# Patient Record
Sex: MALE | Race: WHITE | NOT HISPANIC OR LATINO | Employment: FULL TIME | URBAN - METROPOLITAN AREA
[De-identification: names, ages, dates, MRNs, and addresses within clinical notes are randomized per-mention and may not be internally consistent; named-entity substitution may affect disease eponyms.]

---

## 2017-08-14 ENCOUNTER — OFFICE VISIT (OUTPATIENT)
Dept: GASTROENTEROLOGY | Facility: CLINIC | Age: 47
End: 2017-08-14
Payer: OTHER GOVERNMENT

## 2017-08-14 VITALS
DIASTOLIC BLOOD PRESSURE: 90 MMHG | WEIGHT: 183.81 LBS | BODY MASS INDEX: 29.54 KG/M2 | SYSTOLIC BLOOD PRESSURE: 150 MMHG | HEART RATE: 70 BPM | HEIGHT: 66 IN

## 2017-08-14 DIAGNOSIS — R13.14 PHARYNGOESOPHAGEAL DYSPHAGIA: Primary | ICD-10-CM

## 2017-08-14 DIAGNOSIS — Z87.19 HISTORY OF ESOPHAGEAL STRICTURE: ICD-10-CM

## 2017-08-14 DIAGNOSIS — Z87.898 HISTORY OF DIARRHEA: ICD-10-CM

## 2017-08-14 DIAGNOSIS — R10.13 EPIGASTRIC PAIN: ICD-10-CM

## 2017-08-14 DIAGNOSIS — R89.8 EOSINOPHILS INCREASED: ICD-10-CM

## 2017-08-14 PROCEDURE — 99203 OFFICE O/P NEW LOW 30 MIN: CPT | Mod: S$PBB,,, | Performed by: NURSE PRACTITIONER

## 2017-08-14 PROCEDURE — 99999 PR PBB SHADOW E&M-NEW PATIENT-LVL III: CPT | Mod: PBBFAC,,, | Performed by: NURSE PRACTITIONER

## 2017-08-14 PROCEDURE — 99203 OFFICE O/P NEW LOW 30 MIN: CPT | Mod: PBBFAC,PO | Performed by: NURSE PRACTITIONER

## 2017-08-14 PROCEDURE — 3008F BODY MASS INDEX DOCD: CPT | Mod: ,,, | Performed by: NURSE PRACTITIONER

## 2017-08-14 RX ORDER — HYDROCODONE BITARTRATE AND ACETAMINOPHEN 5; 325 MG/1; MG/1
1 TABLET ORAL EVERY 6 HOURS PRN
Status: ON HOLD | COMMUNITY
End: 2020-03-06 | Stop reason: HOSPADM

## 2017-08-14 RX ORDER — ATORVASTATIN CALCIUM 20 MG/1
20 TABLET, FILM COATED ORAL DAILY
Status: ON HOLD | COMMUNITY
End: 2019-03-26

## 2017-08-14 RX ORDER — METHOCARBAMOL 500 MG/1
500 TABLET, FILM COATED ORAL 4 TIMES DAILY
Status: ON HOLD | COMMUNITY
End: 2019-02-01 | Stop reason: CLARIF

## 2017-08-14 RX ORDER — PHENOBARBITAL, HYOSCYAMINE SULFATE, ATROPINE SULFATE AND SCOPOLAMINE HYDROBROMIDE .0194; .1037; 16.2; .0065 MG/1; MG/1; MG/1; MG/1
1 TABLET ORAL 3 TIMES DAILY PRN
COMMUNITY
End: 2023-08-28

## 2017-08-14 RX ORDER — NAPROXEN 500 MG/1
500 TABLET ORAL 2 TIMES DAILY
COMMUNITY
End: 2021-12-16

## 2017-08-14 NOTE — PATIENT INSTRUCTIONS
Discharge Instructions: Eating a Soft Diet  You have been prescribed a soft diet (also called gastrointestinal soft diet or bland diet). This reduces the amount of work your digestive tract has to do. It also reduces the chance that your digestive tract will be irritated by the food you eat. A soft diet is prescribed for people with digestive problems. The diet consists of foods that are tender, mildly seasoned, and easy to digest. While on this diet, you should not eat fried or spicy foods, or raw fruits and vegetables. Also avoid alcoholic beverages.  General guidelines  · Eat in a calm, relaxed atmosphere. How you eat may be as important as what you eat. Dont rush while eating. Chew your food slowly and thoroughly, and swallow slowly.  · Eat small frequent meals throughout the day, but dont eat within 2 hours of bedtime.  · Avoid any foods that cause discomfort.  · Dont use NSAIDs (nonsteroidal anti-inflammatory drugs), such as aspirin, and ibuprofen. Also avoid medicine that contain aspirin. NSAIDs can cause ulcers and delay or prevent ulcer healing.  · Use antacids as needed, but keep in mind that magnesium-containing antacids may cause diarrhea.  Foods to eat  · Cream of wheat and cream of rice  · Cooked white rice  · Mashed potatoes, and boiled potatoes without skin  · Plain pasta and noodles  · Plain white crackers (such as no-salt soda crackers)  · White bread  · Applesauce  · Cooked fruits without skins or seeds  · Mild juices, such as apple and grape  · Bananas  · Cooked or mashed vegetables without stems and seeds  ¨ Carrots  ¨ Summer squash (zucchini, yellow squash)  ¨ Winter squash (acorn, butternut, spaghetti squash)  · Cottage cheese  · Mild hard or soft cheeses  · Custard  · Yogurt without seeds or nuts  · Milk (you may need lactose-free milk)  · Ice cream without seeds or nuts  · Smooth peanut butter  · Eggs  · Fish, turkey, chicken, or other meat that is not tough or stringy  · Tofu  Foods to  avoid  · Nuts and seeds  · Snack foods, such as the following:  ¨ Chocolate-containing snacks, candy, pastries, or cakes.  ¨ Potato chips (plain, barbecued, or other flavors)  ¨ Taco chips or nachos  ¨ Corn chips  ¨ Popcorn, popcorn cakes, or rice cakes  ¨ Crackers with nuts, seeds, or spicy seasonings  ¨ French fries  · Fried or greasy foods  · Whole-grain breads, rolls, and crackers  · Breads and rolls with nuts, seeds, or bran  · Bran and granola cereals  · Berries with seeds, such as strawberries, raspberries, and blackberries  · Acidic fruits, such as oranges, grapefruits, ludy, limes, and pineapples  · Raw vegetables  · Mild or hot peppers  · Sauerkraut and pickled vegetables  · Tomatoes or tomato products, such as tomato paste, tomato sauce, and tomato juice  · Barbecue sauce  · Spicy or flavored cheeses, such as jalapeño and black pepper cheese  · Crunchy peanut butter  · Dried cooked beans, such as carrington, kidney, or navy beans  · The following meats:  ¨ Fried or greasy meats  ¨ Processed, spicy meats, such as sausage, hope, ham, and lunch meats  ¨ Ribs and other meats with barbecue sauce  ¨ Tough or stringy meats, such as corned beef or beef jerky  Fluids to avoid  · Alcoholic beverages  · Coffee and regular teas  · Brenda and other drinks with caffeine  · Cranberry, orange, pineapple, and grapefruit juice  · Lemonade  · Vegetable juice  · Whole milk, if you are lactose intolerant  Follow-up  Make a follow-up appointment with a dietitian as directed by our staff.  Date Last Reviewed: 6/21/2015  © 3358-0967 [employer]. [address] [place] [address], [place] [address]. All rights reserved. This information is not intended as a substitute for professional medical care. Always follow your healthcare professional's instructions.        GERD (Adult)    The esophagus is a tube that carries food from the mouth to the stomach. A valve at the lower end of the esophagus prevents stomach acid from flowing  "upward. When this valve doesn't work properly, stomach contents may repeatedly flow back up (reflux) into the esophagus. This is called gastroesophageal reflux disease (GERD). GERD can irritate the esophagus. It can cause problems with swallowing or breathing. In severe cases, GERD can cause recurrent pneumonia or other serious problems.  Symptoms of reflux include burning, pressure or sharp pain in the upper abdomen or mid to lower chest. The pain can spread to the neck, back, or shoulder. There may be belching, an acid taste in the back of the throat, chronic cough, or sore throat or hoarseness. GERD symptoms often occur during the day after a big meal. They can also occur at night when lying down.   Home care  Lifestyle changes can help reduce symptoms. If needed, medicines may be prescribed. Symptoms often improve with treatment, but if treatment is stopped, the symptoms often return after a few months. So most persons with GERD will need to continue treatment.  Lifestyle changes  · Limit or avoid fatty, fried, and spicy foods, as well as coffee, chocolate, mint, and foods with high acid content such as tomatoes and citrus fruit and juices (orange, grapefruit, lemon).  · Dont eat large meals, especially at night. Frequent, smaller meals are best. Do not lie down right after eating. And dont eat anything 3 hours before going to bed.  · Avoid drinking alcohol and smoking. As much as possible, stay away from second hand smoke.  · If you are overweight, losing weight will reduce symptoms.   · Avoid wearing tight clothing around your stomach area.  · If your symptoms occur during sleep, use a foam wedge to elevate your upper body (not just your head.) Or, place 4" blocks under the head of your bed.  Medicines  If needed, medicines can help relieve the symptoms of GERD and prevent damage to the esophagus. Discuss a medicine plan with your healthcare provider. This may include one or more of the following " medicines:  · Antacids to help neutralize the normal acids in your stomach.  · Acid blockers (H2 blockers) to decrease acid production.  · Acid inhibitors (PPIs) to decrease acid production in a different way than the blockers. They may work better, but can take a little longer to take effect.  Take an antacid 30-60 minutes after eating and at bedtime, but not at the same time as an acid blocker.  Try not to take medicines such as ibuprofen and aspirin. If you are taking aspirin for your heart or other medical reasons, talk to your healthcare provider about stopping it.  Follow-up care  Follow up with your healthcare provider or as advised by our staff.  When to seek medical advice  Call your healthcare provider if any of the following occur:  · Stomach pain gets worse or moves to the lower right abdomen (appendix area)  · Chest pain appears or gets worse, or spreads to the back, neck, shoulder, or arm  · Frequent vomiting (cant keep down liquids)  · Blood in the stool or vomit (red or black in color)  · Feeling weak or dizzy  · Fever of 100.4ºF (38ºC) or higher, or as directed by your healthcare provider  Date Last Reviewed: 6/23/2015  © 5422-8416 [employer]. [address] [place] [address], [place] [address]. All rights reserved. This information is not intended as a substitute for professional medical care. Always follow your healthcare professional's instructions.

## 2017-08-14 NOTE — LETTER
August 14, 2017      Jayde Fleming MD  [address]  [place] [address]           [facility]  [address] [facility] [address] [place] [address]  Phone: [phone]          Patient: Brennan Bruner   MR Number: [id number]   YOB: 1970   Date of Visit: 8/14/2017       Dear Dr. Jayde Fleming:    Thank you for referring Brennan Bruner to me for evaluation. Attached you will find relevant portions of my assessment and plan of care.    If you have questions, please do not hesitate to call me. I look forward to following Brennan Bruner along with you.    Sincerely,    Mai Crowder, [facility]    Enclosure  CC:  No Recipients    If you would like to receive this communication electronically, please contact [email or web address] or [phone] to request more information on [facility] [employer] [facility] access.    For providers and/or their staff who would like to refer a patient to [facility], please contact us through our one-stop-shop provider referral line, [facility] , at [phone].    If you feel you have received this communication in error or would no longer like to receive these types of communications, please e-mail [email or web address]

## 2017-08-14 NOTE — PROGRESS NOTES
Subjective:       Patient ID: Brennan Bruner is a 47 y.o. male Body mass index is 29.67 kg/m².    Chief Complaint: GI Problem (dysphagia)    This patient is new to me.  [facility]    [facility] found in [facility].  Reviewed medical records received from patient, summarized below and in medical & surgical history (endoscopies, etc), copies made & given to nurse to be scanned into system:   6/17/13 EGD; allergy testing from 8/21/13, 11/8/13 and 6/20/13 visit notes      GI Problem   The primary symptoms include abdominal pain, nausea (frequent) and diarrhea. Primary symptoms do not include fever, weight loss (gained about 10 lbs over the past 2-3 months), vomiting, melena, hematemesis or hematochezia. Primary symptoms comment: CHIEF COMPLAINT: DYSPHAGIA. The illness began more than 7 days ago (over the past year dysphagia).   The abdominal pain began more than 2 days ago (history of abdominal pain for several years; not a new symptom). The abdominal pain has been unchanged since its onset. The abdominal pain is located in the epigastric region (occasional). The severity of the abdominal pain is 0/10 (currently). Relieved by: donnatal prn.   The diarrhea began more than 1 week ago (started 2 months ago). Daily occurrences: bowel movements once daily currently; diarrhea occurred about once a week of 4-5 times a day- has resolved as of lately.   The illness is also significant for dysphagia (CHIEF COMPLAINT: worse with evening meal, fullness/tightness in esophagus; relieved with walking; had in the past and over the past few months symptoms have recurred; relieved in the past with EGD with dilation) and odynophagia (with episodes of dysphagia). The illness does not include anorexia, bloating or constipation. Associated symptoms comments: Early satiety, globus sensation. Significant associated medical issues include GERD (rarely, 2-3 times a  month; PPIs in the past did not help with symptoms including dexilant; controlled with diet and lifestyle modifications). Associated medical issues do not include inflammatory bowel disease.     Review of Systems   Constitutional: Negative for appetite change, fever and weight loss (gained about 10 lbs over the past 2-3 months).        Patient reports history of elevated eosinophils on blood work dating back to 2012 (blood work not in medical records patient brought in to visit today)   HENT: Positive for trouble swallowing. Negative for sore throat and voice change.    Respiratory: Negative for cough and shortness of breath.    Cardiovascular: Negative for chest pain.   Gastrointestinal: Positive for abdominal pain, diarrhea, dysphagia (CHIEF COMPLAINT: worse with evening meal, fullness/tightness in esophagus; relieved with walking; had in the past and over the past few months symptoms have recurred; relieved in the past with EGD with dilation) and nausea (frequent). Negative for anal bleeding, anorexia, bloating, blood in stool, constipation, hematemesis, hematochezia, melena and vomiting.       Past Medical History:   Diagnosis Date    HTN (hypertension)      Past Surgical History:   Procedure Laterality Date    UPPER GASTROINTESTINAL ENDOSCOPY  06/17/2013    [place]- sent for scanning: stenosis s/p dilation, biopsied GEJ, gastritis, antritis (hard to read handwriting); biospy: antrum mild reactive gastropathy, negative h pylori; GEJ: active esophagitis with increased intraepithelial eosinophils; gastric type mucosa with chronic and active inflammation, no kwan's (no proximal esophagus biopsy done)     Family History   Problem Relation Age of Onset    Colon cancer Neg Hx     Colon polyps Neg Hx     Crohn's disease Neg Hx     Ulcerative colitis Neg Hx     Stomach cancer Neg Hx     Esophageal cancer Neg Hx      Wt Readings from Last 10 Encounters:   08/14/17 83.4 kg (183 lb 12.8 oz)     Objective:       Physical Exam   Constitutional: He is oriented to person, place, and time. He appears well-developed and well-nourished. No distress.   HENT:   Mouth/Throat: Oropharynx is clear and moist and mucous membranes are normal. No oral lesions. No oropharyngeal exudate.   Eyes: Conjunctivae are normal. Pupils are equal, round, and reactive to light. No scleral icterus.   Neck: Neck supple. No tracheal deviation present.   Cardiovascular: Normal rate.    Pulmonary/Chest: Effort normal and breath sounds normal. No respiratory distress. He has no wheezes.   Abdominal: Soft. Normal appearance and bowel sounds are normal. He exhibits no distension, no abdominal bruit and no mass. There is no hepatosplenomegaly. There is no tenderness. There is no rigidity, no rebound, no guarding, no tenderness at McBurney's point and negative Ramirez's sign. No hernia.   Lymphadenopathy:     He has no cervical adenopathy.   Neurological: He is alert and oriented to person, place, and time.   Skin: Skin is warm and dry. No rash noted. He is not diaphoretic. No erythema. No pallor.   Non-jaundiced   Psychiatric: He has a normal mood and affect. His behavior is normal.   Nursing note and vitals reviewed.      Assessment:       1. Pharyngoesophageal dysphagia    2. History of esophageal stricture    3. Eosinophils increased    4. History of diarrhea    5. Epigastric pain        Plan:       Pharyngoesophageal dysphagia & History of esophageal stricture  - schedule EGD, discussed procedure with patient and possible esophageal dilation may be performed during procedure if indicated, patient verbalized understanding  - educated patient to eat smaller more frequent meals and to eat slowly and advised to eat a soft diet.  - possible UGI/esophagram/esophageal manometry if symptoms persist  -discussed about the different types of medications used to treat reflux, patient verbalized understanding and declined starting a reflux medication  -Educated  patient on lifestyle modifications to help control/reduce reflux/abdominal pain including: avoid large meals, avoid eating within 2-3 hours of bedtime (avoid late night eating & lying down soon after eating), elevate head of bed if nocturnal symptoms are present, smoking cessation (if current smoker), & weight loss (if overweight).   -Educated to avoid known foods which trigger reflux symptoms & to minimize/avoid high-fat foods, chocolate, caffeine, citrus, alcohol, & tomato products.  -Advised to avoid/limit use of NSAID's, since they can cause GI upset, bleeding, and/or ulcers. If needed, take with food.    Eosinophils increased  - discussed that eosinophilic esophagitis is diagnosed by having proximal and distal esophagus biopsies since distal esophagus findings can be related to reflux  - schedule EGD, discussed procedure with patient, verbalized understanding    epigastric pain  - schedule EGD, discussed procedure with patient, verbalized understanding  - possible abdominal imaging if symptoms persist and pending results of testing    History of diarrhea  - recommend OTC probiotics, such as Align or Culturelle, as directed  - avoid lactose  - if symptoms recur, recommend follow-up for continued evaluation and management to include stool studies and possible colonoscopy    Return in about 2 months (around 10/14/2017), or if symptoms worsen or fail to improve.      If no improvement in symptoms or symptoms worsen, call/follow-up at clinic or go to ER.

## 2017-08-30 NOTE — H&P
History & Physical - Short Stay  Gastroenterology      SUBJECTIVE:     Procedure: Gastroscopy                Chief Complaint/Indication for Procedure: Dysphagia.   History of esophageal stricture.    History of Present Illness:  Office Visit     8/14/2017  [facility]   SILVIO Keith   Gastroenterology   Pharyngoesophageal dysphagia +4 more   Dx   GI Problem ; Referred by Jayde Fleming MD   Reason for Visit    Progress Notes        Subjective:       Patient ID: Brennan Bruner is a 47 y.o. male Body mass index is 29.67 kg/m².     Chief Complaint: GI Problem (dysphagia)     This patient is new to me.  [facility] Provider[facility]     [facility] found in [facility] section.  Reviewed medical records received from patient, summarized below and in medical & surgical history (endoscopies, etc), copies made & given to nurse to be scanned into system:   6/17/13 EGD; allergy testing from 8/21/13, 11/8/13 and 6/20/13 visit notes     GI Problem   The primary symptoms include abdominal pain, nausea (frequent) and diarrhea. Primary symptoms do not include fever, weight loss (gained about 10 lbs over the past 2-3 months), vomiting, melena, hematemesis or hematochezia. Primary symptoms comment: CHIEF COMPLAINT: DYSPHAGIA. The illness began more than 7 days ago (over the past year dysphagia).   The abdominal pain began more than 2 days ago (history of abdominal pain for several years; not a new symptom). The abdominal pain has been unchanged since its onset. The abdominal pain is located in the epigastric region (occasional). The severity of the abdominal pain is 0/10 (currently). Relieved by: donnatal prn.   The diarrhea began more than 1 week ago (started 2 months ago). Daily occurrences: bowel movements once daily currently; diarrhea occurred about once a week of 4-5 times a day- has resolved as of lately.   The illness is also significant for  dysphagia (CHIEF COMPLAINT: worse with evening meal, fullness/tightness in esophagus; relieved with walking; had in the past and over the past few months symptoms have recurred; relieved in the past with EGD with dilation) and odynophagia (with episodes of dysphagia). The illness does not include anorexia, bloating or constipation. Associated symptoms comments: Early satiety, globus sensation. Significant associated medical issues include GERD (rarely, 2-3 times a month; PPIs in the past did not help with symptoms including dexilant; controlled with diet and lifestyle modifications). Associated medical issues do not include inflammatory bowel disease.      Review of Systems   Constitutional: Negative for appetite change, fever and weight loss (gained about 10 lbs over the past 2-3 months).        Patient reports history of elevated eosinophils on blood work dating back to 2012 (blood work not in medical records patient brought in to visit today)   HENT: Positive for trouble swallowing. Negative for sore throat and voice change.    Respiratory: Negative for cough and shortness of breath.    Cardiovascular: Negative for chest pain.   Gastrointestinal: Positive for abdominal pain, diarrhea, dysphagia (CHIEF COMPLAINT: worse with evening meal, fullness/tightness in esophagus; relieved with walking; had in the past and over the past few months symptoms have recurred; relieved in the past with EGD with dilation) and nausea (frequent). Negative for anal bleeding, anorexia, bloating, blood in stool, constipation, hematemesis, hematochezia, melena and vomiting.    UPPER GASTROINTESTINAL ENDOSCOPY   06/17/2013      [place]- sent for scanning: stenosis s/p dilation, biopsied GEJ, gastritis, antritis (hard to read handwriting); biospy: antrum mild reactive gastropathy, negative h pylori; GEJ: active esophagitis with increased intraepithelial eosinophils; gastric type mucosa with chronic and active inflammation, no  kwan's (no proximal esophagus biopsy done)     Assessment:       1. Pharyngoesophageal dysphagia    2. History of esophageal stricture    3. Eosinophils increased    4. History of diarrhea    5. Epigastric pain        Plan:       Pharyngoesophageal dysphagia & History of esophageal stricture  - schedule EGD, discussed procedure with patient and possible esophageal dilation may be performed during procedure if indicated, patient verbalized understanding  - educated patient to eat smaller more frequent meals and to eat slowly and advised to eat a soft diet.  - possible UGI/esophagram/esophageal manometry if symptoms persist  -discussed about the different types of medications used to treat reflux, patient verbalized understanding and declined starting a reflux medication  -Educated patient on lifestyle modifications to help control/reduce reflux/abdominal pain including: avoid large meals, avoid eating within 2-3 hours of bedtime (avoid late night eating & lying down soon after eating), elevate head of bed if nocturnal symptoms are present, smoking cessation (if current smoker), & weight loss (if overweight).   -Educated to avoid known foods which trigger reflux symptoms & to minimize/avoid high-fat foods, chocolate, caffeine, citrus, alcohol, & tomato products.  -Advised to avoid/limit use of NSAID's, since they can cause GI upset, bleeding, and/or ulcers. If needed, take with food.     Eosinophils increased  - discussed that eosinophilic esophagitis is diagnosed by having proximal and distal esophagus biopsies since distal esophagus findings can be related to reflux  - schedule EGD, discussed procedure with patient, verbalized understanding     epigastric pain  - schedule EGD, discussed procedure with patient, verbalized understanding  - possible abdominal imaging if symptoms persist and pending results of testing     History of diarrhea  - recommend OTC probiotics, such as Align or Culturelle, as directed  -  avoid lactose  - if symptoms recur, recommend follow-up for continued evaluation and management to include stool studies and possible colonoscopy     Return in about 2 months (around 10/14/2017), or if symptoms worsen or fail to improve.      If no improvement in symptoms or symptoms worsen, call/follow-up at clinic or go to ER.              PTA Medications   Medication Sig    atorvastatin (LIPITOR) 20 MG tablet Take 20 mg by mouth once daily.    atropine-phenobarbital-scopolamine-hyoscyamine (DONNATAL) 16.2-0.1037 -0.0194 mg Tab Take 1 tablet by mouth 3 (three) times daily as needed.    hydrocodone-acetaminophen 5-325mg (NORCO) 5-325 mg per tablet Take 1 tablet by mouth every 6 (six) hours as needed for Pain.    LOSARTAN POTASSIUM (COZAAR ORAL) Take by mouth.    methocarbamol (ROBAXIN) 500 MG Tab Take 500 mg by mouth 4 (four) times daily.    naproxen (NAPROSYN) 500 MG tablet Take 500 mg by mouth 2 (two) times daily.       Review of patient's allergies indicates:  No Known Allergies     Past Medical History:   Diagnosis Date    HTN (hypertension)      Past Surgical History:   Procedure Laterality Date    ANTERIOR CERVICAL DISCECTOMY W/ FUSION      BACK SURGERY      lumbar discectomy    EYE SURGERY Bilateral     lasik    SHOULDER ARTHROSCOPY Right     UPPER GASTROINTESTINAL ENDOSCOPY  06/17/2013    [place]- sent for scanning: stenosis s/p dilation, biopsied GEJ, gastritis, antritis (hard to read handwriting); biospy: antrum mild reactive gastropathy, negative h pylori; GEJ: active esophagitis with increased intraepithelial eosinophils; gastric type mucosa with chronic and active inflammation, no kwan's (no proximal esophagus biopsy done)     Family History   Problem Relation Age of Onset    Colon cancer Neg Hx     Colon polyps Neg Hx     Crohn's disease Neg Hx     Ulcerative colitis Neg Hx     Stomach cancer Neg Hx     Esophageal cancer Neg Hx      Social History   Substance Use Topics     "Smoking status: Never Smoker    Smokeless tobacco: Never Used    Alcohol use Yes      Comment: socially         OBJECTIVE:     Vital Signs (Most Recent)  Temp: 97.7 °F (36.5 °C) (08/31/17 0958)  Pulse: 82 (08/31/17 0958)  Resp: 16 (08/31/17 0958)  BP: 115/76 (08/31/17 0958)  SpO2: 98 % (08/31/17 0958)    Physical Exam:           :  Ht 5' 6" (1.676 m)    Wt 83.4 kg (183 lb 12.8 oz)    BMI 29.67 kg/m²                       GENERAL:  Comfortable, in no acute distress.                                 HEENT EXAM:  Nonicteric.  No adenopathy.  Oropharynx is clear.               NECK:  Supple.                                                               LUNGS:  Clear.                                                               CARDIAC:  Regular rate and rhythm.  S1, S2.  No murmur.                      ABDOMEN:  Soft, positive bowel sounds, nontender.  No hepatosplenomegaly or masses.  No rebound or guarding.                                             EXTREMITIES:  No edema.     MENTAL STATUS:  Alert and oriented.    ASSESSMENT/PLAN:                 Assessment: Dysphagia.  History of esophageal stricture    Plan: Gastroscopy    Anesthesia Plan:   MAC / General Anaesthesia    ASA Grade: ASA 2 - Patient with mild systemic disease with no functional limitations    MALLAMPATI SCORE: II (hard and soft palate, upper portion of tonsils anduvula visible)  "

## 2017-08-31 ENCOUNTER — HOSPITAL ENCOUNTER (OUTPATIENT)
Facility: HOSPITAL | Age: 47
Discharge: HOME | End: 2017-08-31
Attending: INTERNAL MEDICINE | Admitting: INTERNAL MEDICINE
Payer: OTHER GOVERNMENT

## 2017-08-31 ENCOUNTER — ANESTHESIA (OUTPATIENT)
Dept: ENDOSCOPY | Facility: HOSPITAL | Age: 47
End: 2017-08-31
Payer: OTHER GOVERNMENT

## 2017-08-31 ENCOUNTER — SURGERY (OUTPATIENT)
Age: 47
End: 2017-08-31

## 2017-08-31 ENCOUNTER — ANESTHESIA EVENT (OUTPATIENT)
Dept: ENDOSCOPY | Facility: HOSPITAL | Age: 47
End: 2017-08-31
Payer: OTHER GOVERNMENT

## 2017-08-31 VITALS
DIASTOLIC BLOOD PRESSURE: 78 MMHG | RESPIRATION RATE: 20 BRPM | TEMPERATURE: 98 F | WEIGHT: 175 LBS | BODY MASS INDEX: 27.47 KG/M2 | HEIGHT: 67 IN | SYSTOLIC BLOOD PRESSURE: 122 MMHG | HEART RATE: 95 BPM | RESPIRATION RATE: 13 BRPM | OXYGEN SATURATION: 95 %

## 2017-08-31 DIAGNOSIS — R13.10 DYSPHAGIA: ICD-10-CM

## 2017-08-31 LAB — H PYLORI INDEX VALUE: NEGATIVE

## 2017-08-31 PROCEDURE — 37000008 HC ANESTHESIA 1ST 15 MINUTES: Mod: PO | Performed by: INTERNAL MEDICINE

## 2017-08-31 PROCEDURE — 88305 TISSUE EXAM BY PATHOLOGIST: CPT | Performed by: PATHOLOGY

## 2017-08-31 PROCEDURE — 25000003 PHARM REV CODE 250: Mod: PO | Performed by: INTERNAL MEDICINE

## 2017-08-31 PROCEDURE — 88341 IMHCHEM/IMCYTCHM EA ADD ANTB: CPT | Mod: 26,,, | Performed by: PATHOLOGY

## 2017-08-31 PROCEDURE — 43248 EGD GUIDE WIRE INSERTION: CPT | Mod: ,,, | Performed by: INTERNAL MEDICINE

## 2017-08-31 PROCEDURE — 37000009 HC ANESTHESIA EA ADD 15 MINS: Mod: PO | Performed by: INTERNAL MEDICINE

## 2017-08-31 PROCEDURE — 25000003 PHARM REV CODE 250: Mod: PO | Performed by: NURSE ANESTHETIST, CERTIFIED REGISTERED

## 2017-08-31 PROCEDURE — 43239 EGD BIOPSY SINGLE/MULTIPLE: CPT | Mod: PO | Performed by: INTERNAL MEDICINE

## 2017-08-31 PROCEDURE — 88342 IMHCHEM/IMCYTCHM 1ST ANTB: CPT | Mod: 26,,, | Performed by: PATHOLOGY

## 2017-08-31 PROCEDURE — D9220A PRA ANESTHESIA: Mod: ,,, | Performed by: ANESTHESIOLOGY

## 2017-08-31 PROCEDURE — 43248 EGD GUIDE WIRE INSERTION: CPT | Mod: PO | Performed by: INTERNAL MEDICINE

## 2017-08-31 PROCEDURE — 27201012 HC FORCEPS, HOT/COLD, DISP: Mod: PO | Performed by: INTERNAL MEDICINE

## 2017-08-31 PROCEDURE — 87449 NOS EACH ORGANISM AG IA: CPT | Mod: PO | Performed by: INTERNAL MEDICINE

## 2017-08-31 PROCEDURE — 43239 EGD BIOPSY SINGLE/MULTIPLE: CPT | Mod: ,,, | Performed by: INTERNAL MEDICINE

## 2017-08-31 PROCEDURE — 88305 TISSUE EXAM BY PATHOLOGIST: CPT | Mod: 26,,, | Performed by: PATHOLOGY

## 2017-08-31 PROCEDURE — 63600175 PHARM REV CODE 636 W HCPCS: Mod: PO | Performed by: NURSE ANESTHETIST, CERTIFIED REGISTERED

## 2017-08-31 RX ORDER — PANTOPRAZOLE SODIUM 40 MG/1
40 TABLET, DELAYED RELEASE ORAL
Qty: 90 TABLET | Refills: 4 | Status: SHIPPED | OUTPATIENT
Start: 2017-08-31 | End: 2023-09-08

## 2017-08-31 RX ORDER — GLYCOPYRROLATE 0.2 MG/ML
INJECTION INTRAMUSCULAR; INTRAVENOUS
Status: DISCONTINUED | OUTPATIENT
Start: 2017-08-31 | End: 2017-08-31

## 2017-08-31 RX ORDER — FENTANYL CITRATE 50 UG/ML
INJECTION, SOLUTION INTRAMUSCULAR; INTRAVENOUS
Status: DISCONTINUED | OUTPATIENT
Start: 2017-08-31 | End: 2017-08-31

## 2017-08-31 RX ORDER — PROPOFOL 10 MG/ML
VIAL (ML) INTRAVENOUS
Status: DISCONTINUED | OUTPATIENT
Start: 2017-08-31 | End: 2017-08-31

## 2017-08-31 RX ORDER — SODIUM CHLORIDE, SODIUM LACTATE, POTASSIUM CHLORIDE, CALCIUM CHLORIDE 600; 310; 30; 20 MG/100ML; MG/100ML; MG/100ML; MG/100ML
INJECTION, SOLUTION INTRAVENOUS CONTINUOUS
Status: DISCONTINUED | OUTPATIENT
Start: 2017-08-31 | End: 2017-08-31 | Stop reason: HOSPADM

## 2017-08-31 RX ORDER — ONDANSETRON 4 MG/1
4 TABLET, ORALLY DISINTEGRATING ORAL EVERY 6 HOURS PRN
Qty: 6 TABLET | Refills: 3 | Status: SHIPPED | OUTPATIENT
Start: 2017-08-31 | End: 2019-02-05 | Stop reason: SDUPTHER

## 2017-08-31 RX ORDER — LIDOCAINE HCL/PF 100 MG/5ML
SYRINGE (ML) INTRAVENOUS
Status: DISCONTINUED | OUTPATIENT
Start: 2017-08-31 | End: 2017-08-31

## 2017-08-31 RX ADMIN — GLYCOPYRROLATE 0.2 MG: 0.2 INJECTION, SOLUTION INTRAMUSCULAR; INTRAVENOUS at 10:08

## 2017-08-31 RX ADMIN — PROPOFOL 20 MG: 10 INJECTION, EMULSION INTRAVENOUS at 10:08

## 2017-08-31 RX ADMIN — PROPOFOL 20 MG: 10 INJECTION, EMULSION INTRAVENOUS at 11:08

## 2017-08-31 RX ADMIN — PROPOFOL 50 MG: 10 INJECTION, EMULSION INTRAVENOUS at 10:08

## 2017-08-31 RX ADMIN — SODIUM CHLORIDE, SODIUM LACTATE, POTASSIUM CHLORIDE, AND CALCIUM CHLORIDE: .6; .31; .03; .02 INJECTION, SOLUTION INTRAVENOUS at 10:08

## 2017-08-31 RX ADMIN — FENTANYL CITRATE 50 MCG: 50 INJECTION, SOLUTION INTRAMUSCULAR; INTRAVENOUS at 10:08

## 2017-08-31 RX ADMIN — LIDOCAINE HYDROCHLORIDE 50 MG: 20 INJECTION, SOLUTION INTRAVENOUS at 10:08

## 2017-08-31 NOTE — ANESTHESIA PREPROCEDURE EVALUATION
08/31/2017  Brennan Bruner is a 47 y.o., male.    Anesthesia Evaluation    I have reviewed the Patient Summary Reports.    I have reviewed the Nursing Notes.   I have reviewed the Medications.     Review of Systems  Anesthesia Hx:  No problems with previous Anesthesia    Social:  Non-Smoker, Alcohol Use    Cardiovascular:   Exercise tolerance: good Hypertension Denies CAD.    Denies CABG/stent.   Denies Angina. hyperlipidemia    Musculoskeletal:   Arthritis  H/o ACDF C5-7, neck ROM relatively good Spine Disorders:        Physical Exam  General:  Well nourished    Airway/Jaw/Neck:  Airway Findings: Mouth Opening: Normal Mallampati: II  TM Distance: Normal, at least 6 cm  Jaw/Neck Findings:  Neck ROM: Extension Decreased, Mild      Dental:  Dental Findings: In tact   Chest/Lungs:  Chest/Lungs Findings: Clear to auscultation, Normal Respiratory Rate     Heart/Vascular:  Heart Findings: Rate: Normal  Rhythm: Regular Rhythm        Mental Status:  Mental Status Findings:  Cooperative, Alert and Oriented         Anesthesia Plan  Type of Anesthesia, risks & benefits discussed:  Anesthesia Type:  general  Patient's Preference:   Intra-op Monitoring Plan: standard ASA monitors  Intra-op Monitoring Plan Comments:   Post Op Pain Control Plan:   Post Op Pain Control Plan Comments:   Induction:   IV  Beta Blocker:  Patient is not currently on a Beta-Blocker (No further documentation required).       Informed Consent: Patient understands risks and agrees with Anesthesia plan.  Questions answered. Anesthesia consent signed with patient.  ASA Score: 2     Day of Surgery Review of History & Physical: I have interviewed and examined the patient. I have reviewed the patient's H&P dated:  There are no significant changes.          Ready For Surgery From Anesthesia Perspective.

## 2017-08-31 NOTE — BRIEF OP NOTE
Discharge Note  Short Stay      SUMMARY     Admit Date: 8/31/2017    Attending Physician: Gonzalo Andrea Jr., MD     Discharge Physician: Gonzalo Andrea Jr., MD    Discharge Date: 8/31/2017 11:33 AM    Final Diagnosis: Dysphagia, unspecified type [R13.10]  History of esophageal stricture [Z87.19]    Esophageal changes c/w EE.  Grade a reflux esophagitis.  Minimal antritis.  Dilated, 54 Fr.    Disposition: HOME OR SELF CARE    Patient Instructions:   Current Discharge Medication List      START taking these medications    Details   pantoprazole (PROTONIX) 40 MG tablet Take 1 tablet (40 mg total) by mouth before breakfast.  Qty: 90 tablet, Refills: 4         CONTINUE these medications which have NOT CHANGED    Details   atorvastatin (LIPITOR) 20 MG tablet Take 20 mg by mouth once daily.      atropine-phenobarbital-scopolamine-hyoscyamine (DONNATAL) 16.2-0.1037 -0.0194 mg Tab Take 1 tablet by mouth 3 (three) times daily as needed.      hydrocodone-acetaminophen 5-325mg (NORCO) 5-325 mg per tablet Take 1 tablet by mouth every 6 (six) hours as needed for Pain.      LOSARTAN POTASSIUM (COZAAR ORAL) Take by mouth.      methocarbamol (ROBAXIN) 500 MG Tab Take 500 mg by mouth 4 (four) times daily.      naproxen (NAPROSYN) 500 MG tablet Take 500 mg by mouth 2 (two) times daily.             Discharge Procedure Orders (must include Diet, Follow-up, Activity)    Follow Up:  Follow up with PCP as per your routine.  Please follow an anti reflux diet.  Activity as tolerated.    No driving day of procedure.

## 2017-08-31 NOTE — ANESTHESIA POSTPROCEDURE EVALUATION
"Anesthesia Post Evaluation    Patient: Brennan Bruner    Procedure(s) Performed: Procedure(s) (LRB):  ESOPHAGOGASTRODUODENOSCOPY (EGD) (N/A)    Final Anesthesia Type: general  Patient location during evaluation: PACU  Patient participation: Yes- Able to Participate  Level of consciousness: awake and alert and oriented  Post-procedure vital signs: reviewed and stable  Pain management: adequate  Airway patency: patent  PONV status at discharge: No PONV  Anesthetic complications: no      Cardiovascular status: hemodynamically stable  Respiratory status: unassisted, spontaneous ventilation and room air  Hydration status: euvolemic  Follow-up not needed.        Visit Vitals  /78   Pulse 95   Temp 36.4 °C (97.5 °F) (Temporal)   Resp 20   Ht 5' 6.5" (1.689 m)   Wt 79.4 kg (175 lb)   SpO2 95%   BMI 27.82 kg/m²       Pain/Jacqueline Score: Pain Assessment Performed: Yes (8/31/2017 11:46 AM)  Presence of Pain: denies (8/31/2017 11:46 AM)  Jacqueline Score: 10 (8/31/2017 11:13 AM)      "

## 2017-08-31 NOTE — DISCHARGE INSTRUCTIONS
Recovery After Procedural Sedation (Adult)  You have been given medicine by vein to make you sleep during your surgery. This may have included both a pain medicine and sleeping medicine. Most of the effects have worn off. But you may still have some drowsiness for the next 6 to 8 hours.  Home care  Follow these guidelines when you get home:  · For the next 8 hours, you should be watched by a responsible adult. This person should make sure your condition is not getting worse.  · Don't drink any alcohol for the next 24 hours.  · Don't drive, operate dangerous machinery, or make important business or personal decisions during the next 24 hours.  Note: Your healthcare provider may tell you not to take any medicine by mouth for pain or sleep in the next 4 hours. These medicines may react with the medicines you were given in the hospital. This could cause a much stronger response than usual.  Follow-up care  Follow up with your healthcare provider if you are not alert and back to your usual level of activity within 12 hours.  When to seek medical advice  Call your healthcare provider right away if any of these occur:  · Drowsiness gets worse  · Weakness or dizziness gets worse  · Repeated vomiting  · You can't be awakened   Date Last Reviewed: 10/18/2016  © 9256-5068 [employer]. [address] [place] [address], [place] [address]. All rights reserved. This information is not intended as a substitute for professional medical care. Always follow your healthcare professional's instructions.        Tips to Control Acid Reflux    To control acid reflux, youll need to make some basic diet and lifestyle changes. The simple steps outlined below may be all youll need to ease discomfort.  Watch what you eat  · Avoid fatty foods and spicy foods.  · Eat fewer acidic foods, such as citrus and tomato-based foods. These can increase symptoms.  · Limit drinking alcohol, caffeine, and fizzy beverages. All increase acid  reflux.  · Try limiting chocolate, peppermint, and spearmint. These can worsen acid reflux in some people.  Watch when you eat  · Avoid lying down for 3 hours after eating.  · Do not snack before going to bed.  Raise your head  Raising your head and upper body by 4 to 6 inches helps limit reflux when youre lying down. Put blocks under the head of your bed frame to raise it.  Other changes  · Lose weight, if you need to  · Dont exercise near bedtime  · Avoid tight-fitting clothes  · Limit aspirin and ibuprofen  · Stop smoking   Date Last Reviewed: 7/1/2016  © 4429-5878 [employer]. [address] [place] [address], [place] [address]. All rights reserved. This information is not intended as a substitute for professional medical care. Always follow your healthcare professional's instructions.

## 2017-08-31 NOTE — TRANSFER OF CARE
"Anesthesia Transfer of Care Note    Patient: Brennan Bruner    Procedure(s) Performed: Procedure(s) (LRB):  ESOPHAGOGASTRODUODENOSCOPY (EGD) (N/A)    Patient location: PACU    Anesthesia Type: general    Transport from OR: Transported from OR on room air with adequate spontaneous ventilation    Post pain: adequate analgesia    Post assessment: no apparent anesthetic complications and tolerated procedure well    Post vital signs: stable    Level of consciousness: lethargic and responds to stimulation    Nausea/Vomiting: no nausea/vomiting    Complications: none    Transfer of care protocol was followed      Last vitals:   Visit Vitals  /76 (BP Location: Right arm, Patient Position: Lying)   Pulse 82   Temp 36.5 °C (97.7 °F) (Skin)   Resp 16   Ht 5' 6.5" (1.689 m)   Wt 79.4 kg (175 lb)   SpO2 98%   BMI 27.82 kg/m²     "

## 2017-10-19 ENCOUNTER — TELEPHONE (OUTPATIENT)
Dept: GASTROENTEROLOGY | Facility: CLINIC | Age: 47
End: 2017-10-19

## 2017-10-19 NOTE — TELEPHONE ENCOUNTER
----- Message from Shannan Garcia sent at 10/19/2017  7:47 AM CDT -----  Contact: SELF  Patient's appointment was canceled today and the cancel reason said it had something to do with the provider not being covered. The patient came here for his appointment today and said it could be filed through his . Please contact the patient to reschedule. He told me he had to leave and go to work so he could not wait until 8:00 am to see if someone could come out and talk to him.

## 2018-06-15 ENCOUNTER — OFFICE VISIT (OUTPATIENT)
Dept: GASTROENTEROLOGY | Facility: CLINIC | Age: 48
End: 2018-06-15
Payer: OTHER GOVERNMENT

## 2018-06-15 ENCOUNTER — LAB VISIT (OUTPATIENT)
Dept: LAB | Facility: HOSPITAL | Age: 48
End: 2018-06-15
Attending: NURSE PRACTITIONER
Payer: OTHER GOVERNMENT

## 2018-06-15 VITALS
BODY MASS INDEX: 29.3 KG/M2 | SYSTOLIC BLOOD PRESSURE: 133 MMHG | HEART RATE: 88 BPM | DIASTOLIC BLOOD PRESSURE: 89 MMHG | WEIGHT: 182.31 LBS | HEIGHT: 66 IN

## 2018-06-15 DIAGNOSIS — R14.0 ABDOMINAL BLOATING: ICD-10-CM

## 2018-06-15 DIAGNOSIS — R10.84 GENERALIZED ABDOMINAL PAIN: ICD-10-CM

## 2018-06-15 DIAGNOSIS — R19.7 INTERMITTENT DIARRHEA: Primary | ICD-10-CM

## 2018-06-15 DIAGNOSIS — K20.0 EOSINOPHILIC ESOPHAGITIS: ICD-10-CM

## 2018-06-15 DIAGNOSIS — K58.2 IRRITABLE BOWEL SYNDROME WITH BOTH CONSTIPATION AND DIARRHEA: ICD-10-CM

## 2018-06-15 DIAGNOSIS — R11.0 NAUSEA: ICD-10-CM

## 2018-06-15 DIAGNOSIS — R19.8 IRREGULAR BOWEL HABITS: ICD-10-CM

## 2018-06-15 DIAGNOSIS — R13.14 PHARYNGOESOPHAGEAL DYSPHAGIA: ICD-10-CM

## 2018-06-15 DIAGNOSIS — K92.1 HEMATOCHEZIA: ICD-10-CM

## 2018-06-15 DIAGNOSIS — K29.50 CHRONIC GASTRITIS WITHOUT BLEEDING, UNSPECIFIED GASTRITIS TYPE: ICD-10-CM

## 2018-06-15 DIAGNOSIS — K59.09 INTERMITTENT CONSTIPATION: ICD-10-CM

## 2018-06-15 LAB
ALBUMIN SERPL BCP-MCNC: 4.2 G/DL
ALP SERPL-CCNC: 64 U/L
ALT SERPL W/O P-5'-P-CCNC: 30 U/L
AMYLASE SERPL-CCNC: 93 U/L
ANION GAP SERPL CALC-SCNC: 6 MMOL/L
AST SERPL-CCNC: 24 U/L
BASOPHILS # BLD AUTO: 0.06 K/UL
BASOPHILS NFR BLD: 0.6 %
BILIRUB SERPL-MCNC: 0.6 MG/DL
BUN SERPL-MCNC: 13 MG/DL
CALCIUM SERPL-MCNC: 10.1 MG/DL
CHLORIDE SERPL-SCNC: 102 MMOL/L
CO2 SERPL-SCNC: 30 MMOL/L
CREAT SERPL-MCNC: 1.2 MG/DL
CRP SERPL-MCNC: 2.6 MG/L
DIFFERENTIAL METHOD: NORMAL
EOSINOPHIL # BLD AUTO: 0.5 K/UL
EOSINOPHIL NFR BLD: 4.7 %
ERYTHROCYTE [DISTWIDTH] IN BLOOD BY AUTOMATED COUNT: 12.2 %
ERYTHROCYTE [SEDIMENTATION RATE] IN BLOOD BY WESTERGREN METHOD: 2 MM/HR
EST. GFR  (AFRICAN AMERICAN): >60 ML/MIN/1.73 M^2
EST. GFR  (NON AFRICAN AMERICAN): >60 ML/MIN/1.73 M^2
GLUCOSE SERPL-MCNC: 95 MG/DL
HCT VFR BLD AUTO: 49.3 %
HGB BLD-MCNC: 16.6 G/DL
IMM GRANULOCYTES # BLD AUTO: 0.04 K/UL
IMM GRANULOCYTES NFR BLD AUTO: 0.4 %
LIPASE SERPL-CCNC: 57 U/L
LYMPHOCYTES # BLD AUTO: 2.2 K/UL
LYMPHOCYTES NFR BLD: 21.1 %
MCH RBC QN AUTO: 29.8 PG
MCHC RBC AUTO-ENTMCNC: 33.7 G/DL
MCV RBC AUTO: 89 FL
MONOCYTES # BLD AUTO: 0.7 K/UL
MONOCYTES NFR BLD: 7 %
NEUTROPHILS # BLD AUTO: 7 K/UL
NEUTROPHILS NFR BLD: 66.2 %
NRBC BLD-RTO: 0 /100 WBC
PLATELET # BLD AUTO: 298 K/UL
PMV BLD AUTO: 9.7 FL
POTASSIUM SERPL-SCNC: 5.1 MMOL/L
PROT SERPL-MCNC: 7.5 G/DL
RBC # BLD AUTO: 5.57 M/UL
SODIUM SERPL-SCNC: 138 MMOL/L
WBC # BLD AUTO: 10.59 K/UL

## 2018-06-15 PROCEDURE — 99214 OFFICE O/P EST MOD 30 MIN: CPT | Mod: S$PBB,,, | Performed by: NURSE PRACTITIONER

## 2018-06-15 PROCEDURE — 85651 RBC SED RATE NONAUTOMATED: CPT | Mod: PO

## 2018-06-15 PROCEDURE — 99214 OFFICE O/P EST MOD 30 MIN: CPT | Mod: PBBFAC,PO | Performed by: NURSE PRACTITIONER

## 2018-06-15 PROCEDURE — 80053 COMPREHEN METABOLIC PANEL: CPT

## 2018-06-15 PROCEDURE — 36415 COLL VENOUS BLD VENIPUNCTURE: CPT | Mod: PO

## 2018-06-15 PROCEDURE — 85025 COMPLETE CBC W/AUTO DIFF WBC: CPT

## 2018-06-15 PROCEDURE — 83690 ASSAY OF LIPASE: CPT

## 2018-06-15 PROCEDURE — 82150 ASSAY OF AMYLASE: CPT

## 2018-06-15 PROCEDURE — 86140 C-REACTIVE PROTEIN: CPT

## 2018-06-15 PROCEDURE — 99999 PR PBB SHADOW E&M-EST. PATIENT-LVL IV: CPT | Mod: PBBFAC,,, | Performed by: NURSE PRACTITIONER

## 2018-06-15 RX ORDER — FLUTICASONE PROPIONATE 220 UG/1
1 AEROSOL, METERED RESPIRATORY (INHALATION) 2 TIMES DAILY
Qty: 12 G | Refills: 0 | Status: SHIPPED | OUTPATIENT
Start: 2018-06-15 | End: 2018-07-20 | Stop reason: SDUPTHER

## 2018-06-15 NOTE — PROGRESS NOTES
Subjective:       Patient ID: Brennan Bruner is a 48 y.o. male Body mass index is 29.43 kg/m².    Chief Complaint: Irritable Bowel Syndrome    This patient is established with Dr. Andrea & myself.    Previously reviewed medical records, summarized below and in medical & surgical history (endoscopies, etc), & are in media:   6/17/13 EGD; allergy testing from 8/21/13 & 7/31/13, 11/8/13 and 6/20/13 visit notes      GI Problem   The primary symptoms include abdominal pain, nausea (frequent), diarrhea and hematochezia (scant amount of bright red blood with some bowel movements, noted to toilet paper; history barium enema at 19 years old which showed anal fissure). Primary symptoms do not include fever, weight loss (gained about 10 lbs over the past 2-3 months), vomiting, melena or hematemesis. The illness began more than 7 days ago (started several years ago- 18 years ago).   The abdominal pain began more than 2 days ago (history of abdominal pain for several years; not a new symptom). The abdominal pain has been unchanged since its onset. The abdominal pain is generalized (occasional; mostly in epigastric, occurs prior to diarrhea). The severity of the abdominal pain is 0/10 (currently). Relieved by: donnatal prn; worse with stress.   The diarrhea began more than 1 week ago (started several years ago, about 18 years ago). Daily occurrences: diarrhea occurred about once a week of 4-5 times a day; then goes to constipation for 3-4 days, bowel movements once daily currently of formed stool. Risk factors for illness producing diarrhea include recent antibiotic use (last antibiotic was 5/2018 for sinusitis; denies recent hospitalization, foreign travel, ill contacts, or foreign travel).   The illness is also significant for dysphagia (significantly improved since EGD with dilation, worse with evening meal, fullness/tightness in esophagus; relieved with walking; relieved in the past with EGD with dilation), odynophagia  (with episodes of dysphagia), bloating and constipation. The illness does not include anorexia. Associated symptoms comments: Early satiety, globus sensation- unchanged  Stool currently soft stool. Significant associated medical issues include GERD (rarely, PAST: dexilant, budesionide solution for EOE). Associated medical issues do not include inflammatory bowel disease.     Review of Systems   Constitutional: Negative for appetite change, fever and weight loss (gained about 10 lbs over the past 2-3 months).   HENT: Positive for trouble swallowing. Negative for sore throat and voice change.    Respiratory: Negative for cough and shortness of breath.    Cardiovascular: Negative for chest pain.   Gastrointestinal: Positive for abdominal pain, bloating, constipation, diarrhea, dysphagia (significantly improved since EGD with dilation, worse with evening meal, fullness/tightness in esophagus; relieved with walking; relieved in the past with EGD with dilation), hematochezia (scant amount of bright red blood with some bowel movements, noted to toilet paper; history barium enema at 19 years old which showed anal fissure) and nausea (frequent). Negative for anal bleeding, anorexia, blood in stool, hematemesis, melena, rectal pain and vomiting.   Allergic/Immunologic:        Seen by allergist in the past: tested positive for wheat, rye, barley, banana, yeast, watermelon, beef, along with some others  negative for celiac   Neurological: Negative for weakness.       Past Medical History:   Diagnosis Date    Eosinophilic esophagitis     HTN (hypertension)      Past Surgical History:   Procedure Laterality Date    ANTERIOR CERVICAL DISCECTOMY W/ FUSION      BACK SURGERY      lumbar discectomy    EYE SURGERY Bilateral     lasik    SHOULDER ARTHROSCOPY Right     UPPER GASTROINTESTINAL ENDOSCOPY  06/17/2013    Vicky Thompson- sent for scanning: stenosis s/p dilation, biopsied GEJ, gastritis, antritis (hard to read  "handwriting); biospy: antrum mild reactive gastropathy, negative h pylori; GEJ: active esophagitis with increased intraepithelial eosinophils; gastric type mucosa with chronic and active inflammation, no kwan's (no proximal esophagus biopsy done)    UPPER GASTROINTESTINAL ENDOSCOPY  08/31/2017    Dr. Andrea     Family History   Problem Relation Age of Onset    Colon cancer Neg Hx     Colon polyps Neg Hx     Crohn's disease Neg Hx     Ulcerative colitis Neg Hx     Stomach cancer Neg Hx     Esophageal cancer Neg Hx      Wt Readings from Last 10 Encounters:   06/15/18 82.7 kg (182 lb 5.1 oz)   08/28/17 79.4 kg (175 lb)   08/14/17 83.4 kg (183 lb 12.8 oz)     8/31/17 EGD was reviewed and procedure report states:   " Findings:       The oropharynx was normal.       Mucosal changes including longitudinal furrows were found in the        lower third of the esophagus. Esophageal findings were graded using        the Eosinophilic Esophagitis Endoscopic Reference Score (EoE-EREFS)        as: Furrows Grade 1 Present (vertical lines with or without visible        depth) and Stricture none (no stricture found). Biopsies were taken        with a cold forceps for histology.       LA Grade A (one or more mucosal breaks less than 5 mm, not extending        between tops of 2 mucosal folds) esophagitis (tiny erosion) was        found at the gastroesophageal junction.       The Z-line was otherwise regular and was found 39 cm from the        incisors.       Some laxness of the lower esophageal sphincter was noted, but I did        not see a definite hiatal hernia.       Erythematous mucosa was found at a prominient fold in the cardia.        Biopsies were taken with a cold forceps for histology.       Minimal inflammation characterized by erythema was found in the        prepyloric region of the stomach. Biopsies were taken with a cold        forceps for Helicobacter pylori testing using CLOtest. Biopsies were        taken " "with a cold forceps for histology.       The stomach was otherwise normal.       The examined duodenum was normal. Biopsies for histology were taken        with a cold forceps for evaluation of celiac disease.       No endoscopic abnormality was evident in the esophagus to explain        the patient's complaint of dysphagia. It was decided, however, to        proceed with dilation of the entire esophagus. A guidewire was        placed and the scope was withdrawn. Dilation was performed with a        Savary dilator with no resistance at 54 Fr.  Impression:          - Normal oropharynx.                       - Esophageal mucosal changes suggestive of                        eosinophilic esophagitis. Biopsied.                       - LA Grade A reflux esophagitis.                       - Z-line regular, 39 cm from the incisors.                       - Erythematous mucosa in the cardia. Biopsied.                       - No endoscopic esophageal abnormality to explain                        patient's dysphagia. Esophagus dilated, 54 Fr.                       - Antritis. Biopsied.                       - Normal stomach otherwise.                       - Normal examined duodenum. Biopsied.  Recommendation:      - Discharge patient to home.                       - Await pathology and CLOtest results.                       - Follow an antireflux regimen.                       - Continue present medications.                       - Use Protonix (pantoprazole) 40 mg PO daily.                       - Return to GI clinic in 6-8 weeks. ".  Biopsy results:   "Supplemental Diagnosis  Immunohistochemical stains for Helicobacter species are completed on the stomach biopsies from specimens 2  and 3 and are negative for organisms in both biopsy specimens with satisfactory positive and negative controls.  (Electronically Signed: 2017-09-07 11:49:46 )  Diagnosed by: Lesli Savage M.D.  FINAL PATHOLOGIC DIAGNOSIS  1. Esophagus, " "biopsy:  Esophageal squamous mucosa with marked intraepithelial eosinophils (up to over 85 eosinophils identified in a  single high power field). These findings could be consistent with eosinophilic esophagitis the correct clinical setting.  Correlate clinically.  No evidence of intestinal metaplasia, dysplasia or malignancy is identified.  2. Stomach, cardia, biopsy:  Chronic gastritis with focal activity.  Immunostain histochemical staining for Helicobacter species will be performed and results will follow in a  supplemental report.  3. Stomach, antrum, biopsy:  Chronic gastritis.  Immunohistochemical stain for Helicobacter species will be performed and results will follow in a supplemental  report.  4. Duodenum, biopsy:  Duodenal mucosa with no significant histopathologic abnormality.  No evidence of celiac sprue, dysplasia or malignancy."  Objective:      Physical Exam   Constitutional: He is oriented to person, place, and time. He appears well-developed and well-nourished. No distress.   HENT:   Mouth/Throat: Oropharynx is clear and moist and mucous membranes are normal. No oral lesions. No oropharyngeal exudate.   Eyes: Conjunctivae are normal. Pupils are equal, round, and reactive to light. No scleral icterus.   Cardiovascular: Normal rate.    Pulmonary/Chest: Effort normal and breath sounds normal. No respiratory distress. He has no wheezes.   Abdominal: Soft. Normal appearance and bowel sounds are normal. He exhibits no distension, no abdominal bruit and no mass. There is no hepatosplenomegaly. There is tenderness (mild) in the left lower quadrant. There is no rigidity, no rebound, no guarding, no tenderness at McBurney's point and negative Ramirez's sign. No hernia.   Rectal exam deferred   Neurological: He is alert and oriented to person, place, and time.   Skin: Skin is warm and dry. No rash noted. He is not diaphoretic. No erythema. No pallor.   Non-jaundiced   Psychiatric: He has a normal mood and " affect. His behavior is normal.   Nursing note and vitals reviewed.      Assessment:       1. Intermittent diarrhea    2. Eosinophilic esophagitis    3. Abdominal bloating    4. Irregular bowel habits    5. Generalized abdominal pain    6. Hematochezia    7. Irritable bowel syndrome with both constipation and diarrhea    8. Nausea    9. Intermittent constipation    10. Chronic gastritis without bleeding, unspecified gastritis type    11. Pharyngoesophageal dysphagia        Plan:       Intermittent diarrhea  -     Stool Exam-Ova,Cysts,Parasites; Future; Expected date: 06/15/2018  -     Adenovirus Antigen EIA, Stool; Future; Expected date: 06/15/2018  -     Fecal fat, qualitative; Future; Expected date: 06/15/2018  -     Giardia / Cryptosporidum, EIA; Future; Expected date: 06/15/2018  -     Occult blood x 1, stool; Future; Expected date: 06/15/2018  -     pH, stool; Future; Expected date: 06/15/2018  -     Rotavirus antigen, stool; Future; Expected date: 06/15/2018  -     WBC, Stool; Future; Expected date: 06/15/2018  -     Stool culture; Future; Expected date: 06/15/2018  -     Clostridium difficile EIA; Future; Expected date: 06/15/2018  - recommended OTC probiotic, such as Align or Culturelle, as directed  - avoid lactose  - informed patient can take imodium as directed PRN but advised to take only sparingly, patient verbalized understanding  - continue Donnatal prn as directed and follow-up with prescriber or GI physician for continued evaluation and management of this medication  - schedule Colonoscopy, discussed procedure with the patient, patient verbalized understanding    Eosinophilic esophagitis  -     Ambulatory Referral to Allergy  -  START   fluticasone (FLOVENT HFA) 220 mcg/actuation inhaler; Inhale 1 puff into the lungs 2 (two) times daily. Swallow puffs orally; do not inhale. Rinse mouth with water and spit after use; avoid eating/drinking for 30 minutes after use.  Dispense: 12 g; Refill: 0;  discussed with patient that it is not continuous course; typically treated with 6-8 week course; patient verbalized understanding and agreed with management plan  - continue protonix 40 mg once daily as directed  - AVOID KNOWN TRIGGERS    Abdominal bloating  -     CT Abdomen Pelvis With Contrast; Future; Expected date: 06/15/2018  - schedule Colonoscopy, discussed procedure with the patient, patient verbalized understanding  - recommended OTC simethicone as directed, such as Phazyme or Gas-x  -discussed with patient about low gas diet: Reduce or eliminate these foods from your diet: Broccoli, Cauliflower, [place] sprouts, Cabbage, Cooked dried beans, Carbonated beverages (sparkling water, soda, beer, champagne)  Other Causes Of Excess Gas Include:   1) EATING TOO FAST or TALKING WHILE YOU CHEW may cause you to swallow air. This increases the amount of gas in the stomach and may worsen your symptoms.  --> Chew each mouthful completely before swallowing. Take your time.  2) OVEREATING may increase the feeling of being bloated and cause more gas.  --> When you are full, stop eating.  3) CONSTIPATION can increase the amount of normal intestinal gas.  --> Avoid constipation by increasing the amount of fiber in your diet by including whole cereal grains, fresh vegetables (except those in the above list) and fresh fruits. High-fiber foods absorb water and carry it out of the body. When increasing the amount of fiber in your diet, you also need to increase the amount of water that you drink. You should drink at least eight 8-ounce glasses of water (two quarts) per day.    Irregular bowel habits & Irritable bowel syndrome with both constipation and diarrhea (diarrhea predominant)  -     Stool Exam-Ova,Cysts,Parasites; Future; Expected date: 06/15/2018  -     Adenovirus Antigen EIA, Stool; Future; Expected date: 06/15/2018  -     Fecal fat, qualitative; Future; Expected date: 06/15/2018  -     Giardia / Cryptosporidum, EIA;  Future; Expected date: 06/15/2018  -     Occult blood x 1, stool; Future; Expected date: 06/15/2018  -     pH, stool; Future; Expected date: 06/15/2018  -     Rotavirus antigen, stool; Future; Expected date: 06/15/2018  -     WBC, Stool; Future; Expected date: 06/15/2018  -     Stool culture; Future; Expected date: 06/15/2018  -     Clostridium difficile EIA; Future; Expected date: 06/15/2018  - recommended OTC probiotic, such as Align or Culturelle, as directed  - avoid lactose  - recommended increase fiber in diet, especially soluble fiber since this can help bulk up the stool consistency and may help to slow down how fast the stool goes through the colon and can prevent diarrhea  - continue Donnatal prn as directed and follow-up with prescriber or GI physician for continued evaluation and management of this medication  - schedule Colonoscopy, discussed procedure with the patient, patient verbalized understanding    Generalized abdominal pain & Nausea  -     Ambulatory Referral to Allergy  -     CBC auto differential; Future; Expected date: 06/15/2018  -     Comprehensive metabolic panel; Future; Expected date: 06/15/2018  -     Sedimentation rate; Future; Expected date: 06/15/2018  -     C-reactive protein; Future; Expected date: 06/15/2018  -     Amylase; Future; Expected date: 06/15/2018  -     Lipase; Future; Expected date: 06/15/2018  -     CT Abdomen Pelvis With Contrast; Future; Expected date: 06/15/2018  - schedule Colonoscopy, discussed procedure with the patient, patient verbalized understanding  - continue Donnatal prn as directed and follow-up with prescriber or GI physician for continued evaluation and management of this medication    Hematochezia  -     Occult blood x 1, stool; Future; Expected date: 06/15/2018  - discussed about different etiologies that can cause rectal bleeding, such as diverticulosis, polyps, colon inflammation or infection, anal fissure or hemorrhoids.   - schedule Colonoscopy,  discussed procedure with the patient, verbalized understanding   - You may resume normal activity as long as you feel well.  - Avoid/minimize aspirin and anti-inflammatory drugs such as ibuprofen (Advil, Motrin) and naproxen (Aleve and Naprosyn).  - Avoid alcohol.    Intermittent constipation  Recommended daily exercise as tolerated, adequate water intake (six 8-oz glasses of water daily), and high fiber diet. OTC fiber supplements are recommended if diet does not reach daily fiber goal (25 grams daily), such as Metamucil, Citrucel, or FiberCon (take as directed, separate from other oral medications by >2 hours).  -Recommend taking an OTC stool softener such as Colace as directed to avoid hard stools and straining with bowel movements PRN  -Recommend trying OTC MiraLax once daily (17g PO) as directed PRN constipation  - If no improvement with above recommendations, try intermittently dosed Dulcolax OTC as directed (every 3-4  days) PRN to facilitate bowel movements  -If still no improvement with these measures, call/follow-up  - discussed with patient that a side effect of narcotic pain medications is constipation, advised patient to talk to provider who manages pain medication and to try to stop or decrease use of narcotics, patient verbalized understanding    Chronic gastritis without bleeding, unspecified gastritis type  - CONTINUE PROTONIX 40 MG ONCE DAILY AS DIRECTED, - take in the morning 30-60 minutes before breakfast, discussed about possible long term use of medication (prefer to use lowest effective dose or discontinuing if possible) and discussed the risks & benefits with taking a reflux medication long term, and to take OTC calcium and vitamin d supplements as directed (such as Citracal +D), pt verbalized understanding  -continue lifestyle modifications to help control/reduce reflux/abdominal pain including: avoid large meals, avoid eating within 2-3 hours of bedtime (avoid late night eating & lying down  soon after eating), elevate head of bed if nocturnal symptoms are present, smoking cessation (if current smoker), & weight loss (if overweight).   - avoid known foods which trigger reflux symptoms & to minimize/avoid high-fat foods, chocolate, caffeine, citrus, alcohol, & tomato products.  - avoid/limit use of NSAID's, since they can cause GI upset, bleeding, and/or ulcers. If needed, take with food.    Pharyngoesophageal dysphagia  - educated patient to eat smaller more frequent meals and to eat slowly and advised to eat a soft diet.  - recommend trying EOE treatment as above and if no improvement then proceed with possible repeat EGD/UGI/esophagram/esophageal manometry    Follow-up in about 1 month (around 7/15/2018), or if symptoms worsen or fail to improve, for follow-up with Dr. Andrea.      If no improvement in symptoms or symptoms worsen, call/follow-up at clinic or go to ER.

## 2018-06-26 ENCOUNTER — HOSPITAL ENCOUNTER (OUTPATIENT)
Dept: RADIOLOGY | Facility: HOSPITAL | Age: 48
Discharge: HOME | End: 2018-06-26
Attending: NURSE PRACTITIONER
Payer: OTHER GOVERNMENT

## 2018-06-26 DIAGNOSIS — R14.0 ABDOMINAL BLOATING: ICD-10-CM

## 2018-06-26 DIAGNOSIS — R10.84 GENERALIZED ABDOMINAL PAIN: ICD-10-CM

## 2018-06-26 PROCEDURE — 74177 CT ABD & PELVIS W/CONTRAST: CPT | Mod: 26,,, | Performed by: RADIOLOGY

## 2018-06-26 PROCEDURE — 25500020 PHARM REV CODE 255: Mod: PO | Performed by: NURSE PRACTITIONER

## 2018-06-26 PROCEDURE — 74177 CT ABD & PELVIS W/CONTRAST: CPT | Mod: TC,PO

## 2018-06-26 RX ADMIN — IOHEXOL 75 ML: 350 INJECTION, SOLUTION INTRAVENOUS at 10:06

## 2018-06-26 RX ADMIN — IOHEXOL 30 ML: 350 INJECTION, SOLUTION INTRAVENOUS at 10:06

## 2018-06-27 ENCOUNTER — TELEPHONE (OUTPATIENT)
Dept: GASTROENTEROLOGY | Facility: CLINIC | Age: 48
End: 2018-06-27

## 2018-06-27 NOTE — TELEPHONE ENCOUNTER
Please call to inform & review the results with the patient- radiology report of the CT scan showed no acute findings: mildly prominent amount of stool in the colon & diverticulosis without findings of diverticulitis. Recommend high fiber diet (20-30 grams of fiber daily)/OTC fiber supplements daily as directed. Otherwise, unremarkable findings of the GI organs.  Other findings showed possible bilateral kidney cysts (one to each kidney). These should be monitored by PCP.  Continue with previous recommendations. If no improvement in symptoms or symptoms worsen, call/follow-up at clinic or go to ER.  Please release results to patient's mychart once you have discussed results and recommendations with patient.  Thanks,  Mai NUNEZ

## 2018-07-20 ENCOUNTER — LAB VISIT (OUTPATIENT)
Dept: LAB | Facility: HOSPITAL | Age: 48
End: 2018-07-20
Attending: NURSE PRACTITIONER
Payer: OTHER GOVERNMENT

## 2018-07-20 DIAGNOSIS — K92.1 HEMATOCHEZIA: ICD-10-CM

## 2018-07-20 DIAGNOSIS — R19.8 IRREGULAR BOWEL HABITS: ICD-10-CM

## 2018-07-20 DIAGNOSIS — R19.7 INTERMITTENT DIARRHEA: ICD-10-CM

## 2018-07-20 LAB
OB PNL STL: NEGATIVE
WBC #/AREA STL HPF: NORMAL /[HPF]

## 2018-07-20 PROCEDURE — 87046 STOOL CULTR AEROBIC BACT EA: CPT

## 2018-07-20 PROCEDURE — 87045 FECES CULTURE AEROBIC BACT: CPT

## 2018-07-20 PROCEDURE — 89125 SPECIMEN FAT STAIN: CPT

## 2018-07-20 PROCEDURE — 87301 ADENOVIRUS AG IA: CPT

## 2018-07-20 PROCEDURE — 82272 OCCULT BLD FECES 1-3 TESTS: CPT

## 2018-07-20 PROCEDURE — 87427 SHIGA-LIKE TOXIN AG IA: CPT

## 2018-07-20 PROCEDURE — 87425 ROTAVIRUS AG IA: CPT

## 2018-07-20 PROCEDURE — 87328 CRYPTOSPORIDIUM AG IA: CPT

## 2018-07-20 PROCEDURE — 87209 SMEAR COMPLEX STAIN: CPT

## 2018-07-20 PROCEDURE — 89055 LEUKOCYTE ASSESSMENT FECAL: CPT

## 2018-07-20 PROCEDURE — 87493 C DIFF AMPLIFIED PROBE: CPT

## 2018-07-20 PROCEDURE — 83986 ASSAY PH BODY FLUID NOS: CPT

## 2018-07-20 PROCEDURE — 87324 CLOSTRIDIUM AG IA: CPT | Mod: 59

## 2018-07-21 LAB
C DIFF GDH STL QL: POSITIVE
C DIFF TOX A+B STL QL IA: NEGATIVE
C DIFF TOX GENS STL QL NAA+PROBE: POSITIVE
CRYPTOSP AG STL QL IA: NEGATIVE
G LAMBLIA AG STL QL IA: NEGATIVE
RV AG STL QL IA.RAPID: POSITIVE

## 2018-07-23 ENCOUNTER — TELEPHONE (OUTPATIENT)
Dept: GASTROENTEROLOGY | Facility: CLINIC | Age: 48
End: 2018-07-23

## 2018-07-23 DIAGNOSIS — A04.72 C. DIFFICILE DIARRHEA: Primary | ICD-10-CM

## 2018-07-23 LAB
BACTERIA STL CULT: NORMAL
E COLI SXT1 STL QL IA: NEGATIVE
E COLI SXT2 STL QL IA: NEGATIVE
FAT STL SUDAN IV STN: NORMAL
O+P STL TRI STN: NORMAL
PH STL: NORMAL [PH]

## 2018-07-23 RX ORDER — VANCOMYCIN HYDROCHLORIDE 125 MG/1
125 CAPSULE ORAL EVERY 6 HOURS
Qty: 40 CAPSULE | Refills: 0 | Status: SHIPPED | OUTPATIENT
Start: 2018-07-23 | End: 2018-08-02

## 2018-07-23 NOTE — TELEPHONE ENCOUNTER
Please call to inform & review the results with the patient- Some stool studies are still pending (we will notify you once received and reviewed), but the ones that have come back showed positive for C. Diff & rotavirus. Otherwise, negative/normal findings.  I recommend to avoid anti-motility medications (such as lomotil, Pepto or imodium), take OTC florastor probiotic twice daily and a course of antibiotics- vancomycin PO (sent to pharmacy on file). instruct on good handwashing to prevent spread of bacteria  - Follow-up in 2-4 weeks once completed antibiotic course.   Rotavirus is an acute viral gastroenteritis which is usually self-limited & is treated with supportive measures (fluid repletion- increasing fluid intake especially with electrolyte-enhanced fluids to avoid dehydration and unrestricted nutrition as tolerated). No specific antiviral/antibiotic agents are available. Recommend trying an OTC probiotics, such as florastor, taken as directed; avoid/minimize use of anti-motility agents. Recommend frequent handwashing and good hand hygiene.    Continue with previous recommendations. If no improvement in symptoms or symptoms worsen, call/follow-up at clinic or go to ER.  Please release results to patient's mychart once you have discussed results and recommendations with patient.  Thanks,  Mai NUNEZ

## 2018-07-25 LAB — HADV AG STL QL IA: NOT DETECTED

## 2018-08-20 ENCOUNTER — TELEPHONE (OUTPATIENT)
Dept: GASTROENTEROLOGY | Facility: CLINIC | Age: 48
End: 2018-08-20

## 2018-08-20 NOTE — TELEPHONE ENCOUNTER
----- Message from Kosta Parrish sent at 8/20/2018 11:12 AM CDT -----  Contact: patient  Type: Needs Medical Advice    Who Called:  patient  Symptoms (please be specific):    How long has patient had these symptoms:    Pharmacy name and phone #:    Best Call Back Number: [phone]  Additional Information: requesting a call back to schedule colonscopy

## 2018-09-09 NOTE — H&P
History & Physical - Short Stay  Gastroenterology      SUBJECTIVE:     Procedure: Colonoscopy    Chief Complaint/Indication for Procedure: Intermittent diarrhea.  Hematochezia.    History of Present Illness:  Office Visit     6/15/2018  [place] - Gastroenterology      Mai Crowder KEL   Gastroenterology   Intermittent diarrhea +10 more   Dx   Irritable Bowel Syndrome   Reason for Visit    Progress Notes        Subjective:       Patient ID: Brennan Bruner is a 48 y.o. male Body mass index is 29.43 kg/m².     Chief Complaint: Irritable Bowel Syndrome     This patient is established with Dr. Andrea & myself.     Previously reviewed medical records, summarized below and in medical & surgical history (endoscopies, etc), & are in media:   6/17/13 EGD; allergy testing from 8/21/13 & 7/31/13, 11/8/13 and 6/20/13 visit notes        GI Problem   The primary symptoms include abdominal pain, nausea (frequent), diarrhea and hematochezia (scant amount of bright red blood with some bowel movements, noted to toilet paper; history barium enema at 19 years old which showed anal fissure). Primary symptoms do not include fever, weight loss (gained about 10 lbs over the past 2-3 months), vomiting, melena or hematemesis. The illness began more than 7 days ago (started several years ago- 18 years ago).   The abdominal pain began more than 2 days ago (history of abdominal pain for several years; not a new symptom). The abdominal pain has been unchanged since its onset. The abdominal pain is generalized (occasional; mostly in epigastric, occurs prior to diarrhea). The severity of the abdominal pain is 0/10 (currently). Relieved by: donnatal prn; worse with stress.   The diarrhea began more than 1 week ago (started several years ago, about 18 years ago). Daily occurrences: diarrhea occurred about once a week of 4-5 times a day; then goes to constipation for 3-4 days, bowel movements once daily currently of formed stool. Risk  "factors for illness producing diarrhea include recent antibiotic use (last antibiotic was 5/2018 for sinusitis; denies recent hospitalization, foreign travel, ill contacts, or foreign travel).   The illness is also significant for dysphagia (significantly improved since EGD with dilation, worse with evening meal, fullness/tightness in esophagus; relieved with walking; relieved in the past with EGD with dilation), odynophagia (with episodes of dysphagia), bloating and constipation. The illness does not include anorexia. Associated symptoms comments: Early satiety, globus sensation- unchanged  Stool currently soft stool. Significant associated medical issues include GERD (rarely, PAST: dexilant, budesionide solution for EOE). Associated medical issues do not include inflammatory bowel disease.     8/31/17 EGD was reviewed and procedure report states:   " Findings:       The oropharynx was normal.       Mucosal changes including longitudinal furrows were found in the        lower third of the esophagus. Esophageal findings were graded using        the Eosinophilic Esophagitis Endoscopic Reference Score (EoE-EREFS)        as: Furrows Grade 1 Present (vertical lines with or without visible        depth) and Stricture none (no stricture found). Biopsies were taken        with a cold forceps for histology.       LA Grade A (one or more mucosal breaks less than 5 mm, not extending        between tops of 2 mucosal folds) esophagitis (tiny erosion) was        found at the gastroesophageal junction.       The Z-line was otherwise regular and was found 39 cm from the        incisors.       Some laxness of the lower esophageal sphincter was noted, but I did        not see a definite hiatal hernia.       Erythematous mucosa was found at a prominient fold in the cardia.        Biopsies were taken with a cold forceps for histology.       Minimal inflammation characterized by erythema was found in the        prepyloric region of the " "stomach. Biopsies were taken with a cold        forceps for Helicobacter pylori testing using CLOtest. Biopsies were        taken with a cold forceps for histology.       The stomach was otherwise normal.       The examined duodenum was normal. Biopsies for histology were taken        with a cold forceps for evaluation of celiac disease.       No endoscopic abnormality was evident in the esophagus to explain        the patient's complaint of dysphagia. It was decided, however, to        proceed with dilation of the entire esophagus. A guidewire was        placed and the scope was withdrawn. Dilation was performed with a        Savary dilator with no resistance at 54 Fr.  Impression:          - Normal oropharynx.                       - Esophageal mucosal changes suggestive of                        eosinophilic esophagitis. Biopsied.                       - LA Grade A reflux esophagitis.                       - Z-line regular, 39 cm from the incisors.                       - Erythematous mucosa in the cardia. Biopsied.                       - No endoscopic esophageal abnormality to explain                        patient's dysphagia. Esophagus dilated, 54 Fr.                       - Antritis. Biopsied.                       - Normal stomach otherwise.                       - Normal examined duodenum. Biopsied.  Recommendation:      - Discharge patient to home.                       - Await pathology and CLOtest results.                       - Follow an antireflux regimen.                       - Continue present medications.                       - Use Protonix (pantoprazole) 40 mg PO daily.                       - Return to GI clinic in 6-8 weeks. ".  Biopsy results:   "Supplemental Diagnosis  Immunohistochemical stains for Helicobacter species are completed on the stomach biopsies from specimens 2  and 3 and are negative for organisms in both biopsy specimens with satisfactory positive and negative " "controls.  (Electronically Signed: 2017-09-07 11:49:46 )  Diagnosed by: Lesli Savage M.D.  FINAL PATHOLOGIC DIAGNOSIS  1. Esophagus, biopsy:  Esophageal squamous mucosa with marked intraepithelial eosinophils (up to over 85 eosinophils identified in a  single high power field). These findings could be consistent with eosinophilic esophagitis the correct clinical setting.  Correlate clinically.  No evidence of intestinal metaplasia, dysplasia or malignancy is identified.  2. Stomach, cardia, biopsy:  Chronic gastritis with focal activity.  Immunostain histochemical staining for Helicobacter species will be performed and results will follow in a  supplemental report.  3. Stomach, antrum, biopsy:  Chronic gastritis.  Immunohistochemical stain for Helicobacter species will be performed and results will follow in a supplemental  report.  4. Duodenum, biopsy:  Duodenal mucosa with no significant histopathologic abnormality.  No evidence of celiac sprue, dysplasia or malignancy."    Assessment:       1. Intermittent diarrhea    2. Eosinophilic esophagitis    3. Abdominal bloating    4. Irregular bowel habits    5. Generalized abdominal pain    6. Hematochezia    7. Irritable bowel syndrome with both constipation and diarrhea    8. Nausea    9. Intermittent constipation    10. Chronic gastritis without bleeding, unspecified gastritis type    11. Pharyngoesophageal dysphagia        Plan:       Intermittent diarrhea  -     Stool Exam-Ova,Cysts,Parasites; Future; Expected date: 06/15/2018  -     Adenovirus Antigen EIA, Stool; Future; Expected date: 06/15/2018  -     Fecal fat, qualitative; Future; Expected date: 06/15/2018  -     Giardia / Cryptosporidum, EIA; Future; Expected date: 06/15/2018  -     Occult blood x 1, stool; Future; Expected date: 06/15/2018  -     pH, stool; Future; Expected date: 06/15/2018  -     Rotavirus antigen, stool; Future; Expected date: 06/15/2018  -     WBC, Stool; Future; Expected date: " 06/15/2018  -     Stool culture; Future; Expected date: 06/15/2018  -     Clostridium difficile EIA; Future; Expected date: 06/15/2018  - recommended OTC probiotic, such as Align or Culturelle, as directed  - avoid lactose  - informed patient can take imodium as directed PRN but advised to take only sparingly, patient verbalized understanding  - continue Donnatal prn as directed and follow-up with prescriber or GI physician for continued evaluation and management of this medication  - schedule Colonoscopy, discussed procedure with the patient, patient verbalized understanding     Eosinophilic esophagitis  -     Ambulatory Referral to Allergy  -  START   fluticasone (FLOVENT HFA) 220 mcg/actuation inhaler; Inhale 1 puff into the lungs 2 (two) times daily. Swallow puffs orally; do not inhale. Rinse mouth with water and spit after use; avoid eating/drinking for 30 minutes after use.  Dispense: 12 g; Refill: 0; discussed with patient that it is not continuous course; typically treated with 6-8 week course; patient verbalized understanding and agreed with management plan  - continue protonix 40 mg once daily as directed  - AVOID KNOWN TRIGGERS     Abdominal bloating  -     CT Abdomen Pelvis With Contrast; Future; Expected date: 06/15/2018  - schedule Colonoscopy, discussed procedure with the patient, patient verbalized understanding  - recommended OTC simethicone as directed, such as Phazyme or Gas-x  -discussed with patient about low gas diet: Reduce or eliminate these foods from your diet: Broccoli, Cauliflower, Rogers sprouts, Cabbage, Cooked dried beans, Carbonated beverages (sparkling water, soda, beer, champagne)  Other Causes Of Excess Gas Include:   1) EATING TOO FAST or TALKING WHILE YOU CHEW may cause you to swallow air. This increases the amount of gas in the stomach and may worsen your symptoms.  --> Chew each mouthful completely before swallowing. Take your time.  2) OVEREATING may increase the feeling of  being bloated and cause more gas.  --> When you are full, stop eating.  3) CONSTIPATION can increase the amount of normal intestinal gas.  --> Avoid constipation by increasing the amount of fiber in your diet by including whole cereal grains, fresh vegetables (except those in the above list) and fresh fruits. High-fiber foods absorb water and carry it out of the body. When increasing the amount of fiber in your diet, you also need to increase the amount of water that you drink. You should drink at least eight 8-ounce glasses of water (two quarts) per day.     Irregular bowel habits & Irritable bowel syndrome with both constipation and diarrhea (diarrhea predominant)  -     Stool Exam-Ova,Cysts,Parasites; Future; Expected date: 06/15/2018  -     Adenovirus Antigen EIA, Stool; Future; Expected date: 06/15/2018  -     Fecal fat, qualitative; Future; Expected date: 06/15/2018  -     Giardia / Cryptosporidum, EIA; Future; Expected date: 06/15/2018  -     Occult blood x 1, stool; Future; Expected date: 06/15/2018  -     pH, stool; Future; Expected date: 06/15/2018  -     Rotavirus antigen, stool; Future; Expected date: 06/15/2018  -     WBC, Stool; Future; Expected date: 06/15/2018  -     Stool culture; Future; Expected date: 06/15/2018  -     Clostridium difficile EIA; Future; Expected date: 06/15/2018  - recommended OTC probiotic, such as Align or Culturelle, as directed  - avoid lactose  - recommended increase fiber in diet, especially soluble fiber since this can help bulk up the stool consistency and may help to slow down how fast the stool goes through the colon and can prevent diarrhea  - continue Donnatal prn as directed and follow-up with prescriber or GI physician for continued evaluation and management of this medication  - schedule Colonoscopy, discussed procedure with the patient, patient verbalized understanding     Generalized abdominal pain & Nausea  -     Ambulatory Referral to Allergy  -     CBC auto  differential; Future; Expected date: 06/15/2018  -     Comprehensive metabolic panel; Future; Expected date: 06/15/2018  -     Sedimentation rate; Future; Expected date: 06/15/2018  -     C-reactive protein; Future; Expected date: 06/15/2018  -     Amylase; Future; Expected date: 06/15/2018  -     Lipase; Future; Expected date: 06/15/2018  -     CT Abdomen Pelvis With Contrast; Future; Expected date: 06/15/2018  - schedule Colonoscopy, discussed procedure with the patient, patient verbalized understanding  - continue Donnatal prn as directed and follow-up with prescriber or GI physician for continued evaluation and management of this medication     Hematochezia  -     Occult blood x 1, stool; Future; Expected date: 06/15/2018  - discussed about different etiologies that can cause rectal bleeding, such as diverticulosis, polyps, colon inflammation or infection, anal fissure or hemorrhoids.   - schedule Colonoscopy, discussed procedure with the patient, verbalized understanding   - You may resume normal activity as long as you feel well.  - Avoid/minimize aspirin and anti-inflammatory drugs such as ibuprofen (Advil, Motrin) and naproxen (Aleve and Naprosyn).  - Avoid alcohol.     Intermittent constipation  Recommended daily exercise as tolerated, adequate water intake (six 8-oz glasses of water daily), and high fiber diet. OTC fiber supplements are recommended if diet does not reach daily fiber goal (25 grams daily), such as Metamucil, Citrucel, or FiberCon (take as directed, separate from other oral medications by >2 hours).  -Recommend taking an OTC stool softener such as Colace as directed to avoid hard stools and straining with bowel movements PRN  -Recommend trying OTC MiraLax once daily (17g PO) as directed PRN constipation  - If no improvement with above recommendations, try intermittently dosed Dulcolax OTC as directed (every 3-4  days) PRN to facilitate bowel movements  -If still no improvement with these  measures, call/follow-up  - discussed with patient that a side effect of narcotic pain medications is constipation, advised patient to talk to provider who manages pain medication and to try to stop or decrease use of narcotics, patient verbalized understanding     Chronic gastritis without bleeding, unspecified gastritis type  - CONTINUE PROTONIX 40 MG ONCE DAILY AS DIRECTED, - take in the morning 30-60 minutes before breakfast, discussed about possible long term use of medication (prefer to use lowest effective dose or discontinuing if possible) and discussed the risks & benefits with taking a reflux medication long term, and to take OTC calcium and vitamin d supplements as directed (such as Citracal +D), pt verbalized understanding  -continue lifestyle modifications to help control/reduce reflux/abdominal pain including: avoid large meals, avoid eating within 2-3 hours of bedtime (avoid late night eating & lying down soon after eating), elevate head of bed if nocturnal symptoms are present, smoking cessation (if current smoker), & weight loss (if overweight).   - avoid known foods which trigger reflux symptoms & to minimize/avoid high-fat foods, chocolate, caffeine, citrus, alcohol, & tomato products.  - avoid/limit use of NSAID's, since they can cause GI upset, bleeding, and/or ulcers. If needed, take with food.     Pharyngoesophageal dysphagia  - educated patient to eat smaller more frequent meals and to eat slowly and advised to eat a soft diet.  - recommend trying EOE treatment as above and if no improvement then proceed with possible repeat EGD/UGI/esophagram/esophageal manometry     Follow-up in about 1 month (around 7/15/2018), or if symptoms worsen or fail to improve, for follow-up with Dr. Andrea.            CT scan  Impression       No acute abdominal findings    Findings as detailed above including diverticulosis without CT findings of acute diverticulitis, renal masses consistent with cysts, normal  appearance of the appendix.    Electronically signed by: Vesta Marin MD  Date: 06/26/2018         Mai MAYRASILVIO Ferrer     7/23/18 7:58 AM   Note      Please call to inform & review the results with the patient- Some stool studies are still pending (we will notify you once received and reviewed), but the ones that have come back showed positive for C. Diff & rotavirus. Otherwise, negative/normal findings.  I recommend to avoid anti-motility medications (such as lomotil, Pepto or imodium), take OTC florastor probiotic twice daily and a course of antibiotics- vancomycin PO (sent to pharmacy on file). instruct on good handwashing to prevent spread of bacteria  - Follow-up in 2-4 weeks once completed antibiotic course.   Rotavirus is an acute viral gastroenteritis which is usually self-limited & is treated with supportive measures (fluid repletion- increasing fluid intake especially with electrolyte-enhanced fluids to avoid dehydration and unrestricted nutrition as tolerated). No specific antiviral/antibiotic agents are available. Recommend trying an OTC probiotics, such as florastor, taken as directed; avoid/minimize use of anti-motility agents. Recommend frequent handwashing and good hand hygiene.            PTA Medications   Medication Sig    atorvastatin (LIPITOR) 20 MG tablet Take 20 mg by mouth once daily.    atropine-phenobarbital-scopolamine-hyoscyamine (DONNATAL) 16.2-0.1037 -0.0194 mg Tab Take 1 tablet by mouth 3 (three) times daily as needed.    docusate sodium (COLACE) 50 MG capsule Take 50 mg by mouth every 12 (twelve) hours as needed for Constipation.    fluticasone (FLOVENT HFA) 220 mcg/actuation inhaler Swallow 2 puffs orally; do not inhale. Rinse mouth with water and spit after use; avoid eating/drinking for 30 minutes after use.    hydrocodone-acetaminophen 5-325mg (NORCO) 5-325 mg per tablet Take 1 tablet by mouth every 6 (six) hours as needed for Pain.    loperamide (IMODIUM) 2 mg  capsule Take 2 mg by mouth 4 (four) times daily as needed for Diarrhea.    methocarbamol (ROBAXIN) 500 MG Tab Take 500 mg by mouth 4 (four) times daily.    naproxen (NAPROSYN) 500 MG tablet Take 500 mg by mouth 2 (two) times daily.    ondansetron (ZOFRAN-ODT) 4 MG TbDL Take 1 tablet (4 mg total) by mouth every 6 (six) hours as needed.    pantoprazole (PROTONIX) 40 MG tablet Take 1 tablet (40 mg total) by mouth before breakfast.    promethazine (PHENERGAN) 25 MG tablet Take 25 mg by mouth every 4 to 6 hours as needed.    valsartan (DIOVAN) 160 MG tablet Take 160 mg by mouth once daily.       Review of patient's allergies indicates:  No Known Allergies     Past Medical History:   Diagnosis Date    C. difficile diarrhea     Eosinophilic esophagitis     HTN (hypertension)      Past Surgical History:   Procedure Laterality Date    ANTERIOR CERVICAL DISCECTOMY W/ FUSION      BACK SURGERY      lumbar discectomy    ESOPHAGOGASTRODUODENOSCOPY (EGD) N/A 8/31/2017    Performed [facility]alo [facility] Jr[facility]    EYE SURGERY Bilateral     lasik    SHOULDER ARTHROSCOPY Right     UPPER GASTROINTESTINAL ENDOSCOPY  06/17/2013    [place]- sent for scanning: stenosis s/p dilation, biopsied GEJ, gastritis, antritis (hard to read handwriting); biospy: antrum mild reactive gastropathy, negative h pylori; GEJ: active esophagitis with increased intraepithelial eosinophils; gastric type mucosa with chronic and active inflammation, no kwan's (no proximal esophagus biopsy done)    UPPER GASTROINTESTINAL ENDOSCOPY  08/31/2017    Dr. Andrea     Family History   Problem Relation Age of Onset    Colon cancer Neg Hx     Colon polyps Neg Hx     Crohn's disease Neg Hx     Ulcerative colitis Neg Hx     Stomach cancer Neg Hx     Esophageal cancer Neg Hx      Social History     Tobacco Use    Smoking status: Never Smoker    Smokeless tobacco: Never Used   Substance Use Topics    Alcohol use: Yes     Comment:  "socially    Drug use: No         OBJECTIVE:     Vital Signs (Most Recent)  Temp: 98.2 °F (36.8 °C) (09/10/18 1056)  Pulse: 81 (09/10/18 1056)  Resp: 16 (09/10/18 1056)  BP: (!) 168/93 (09/10/18 1056)  SpO2: 96 % (09/10/18 1056)    Physical Exam:     : Ht 5' 6" (1.676 m)   Wt 82.7 kg (182 lb 5.1 oz)   BMI 29.43 kg/m²                     GENERAL:  Comfortable, in no acute distress.                                 HEENT EXAM:  Nonicteric.  No adenopathy.  Oropharynx is clear.               NECK:  Supple.                                                               LUNGS:  Clear.                                                               CARDIAC:  Regular rate and rhythm.  S1, S2.  No murmur.                      ABDOMEN:  Soft, positive bowel sounds, nontender.  No hepatosplenomegaly or masses.  No rebound or guarding.                                             EXTREMITIES:  No edema.     MENTAL STATUS:  Alert and oriented.    ASSESSMENT/PLAN:     Assessment:  Intermittent diarrhea.  Hematochezia.    Plan: Colonoscopy    Anesthesia Plan:   MAC / General Anaesthesia    ASA Grade: ASA 2 - Patient with mild systemic disease with no functional limitations    MALLAMPATI SCORE: I (soft palate, uvula, fauces, and tonsillar pillars visible)    "

## 2018-09-10 ENCOUNTER — ANESTHESIA EVENT (OUTPATIENT)
Dept: ENDOSCOPY | Facility: HOSPITAL | Age: 48
End: 2018-09-10
Payer: OTHER GOVERNMENT

## 2018-09-10 ENCOUNTER — ANESTHESIA (OUTPATIENT)
Dept: ENDOSCOPY | Facility: HOSPITAL | Age: 48
End: 2018-09-10
Payer: OTHER GOVERNMENT

## 2018-09-10 ENCOUNTER — HOSPITAL ENCOUNTER (OUTPATIENT)
Facility: HOSPITAL | Age: 48
Discharge: HOME | End: 2018-09-10
Attending: INTERNAL MEDICINE | Admitting: INTERNAL MEDICINE
Payer: OTHER GOVERNMENT

## 2018-09-10 VITALS
HEART RATE: 79 BPM | TEMPERATURE: 97 F | RESPIRATION RATE: 16 BRPM | OXYGEN SATURATION: 98 % | SYSTOLIC BLOOD PRESSURE: 124 MMHG | DIASTOLIC BLOOD PRESSURE: 82 MMHG

## 2018-09-10 DIAGNOSIS — R19.7 DIARRHEA: ICD-10-CM

## 2018-09-10 LAB
C DIFF GDH STL QL: POSITIVE
C DIFF TOX A+B STL QL IA: NEGATIVE
C DIFF TOX GENS STL QL NAA+PROBE: POSITIVE

## 2018-09-10 PROCEDURE — 37000008 HC ANESTHESIA 1ST 15 MINUTES: Mod: PO | Performed by: INTERNAL MEDICINE

## 2018-09-10 PROCEDURE — 87209 SMEAR COMPLEX STAIN: CPT

## 2018-09-10 PROCEDURE — 00811 ANES LWR INTST NDSC NOS: CPT | Mod: PO | Performed by: INTERNAL MEDICINE

## 2018-09-10 PROCEDURE — 63600175 PHARM REV CODE 636 W HCPCS: Mod: PO | Performed by: NURSE ANESTHETIST, CERTIFIED REGISTERED

## 2018-09-10 PROCEDURE — 87046 STOOL CULTR AEROBIC BACT EA: CPT | Mod: 59

## 2018-09-10 PROCEDURE — 27201089 HC SNARE, DISP (ANY): Mod: PO | Performed by: INTERNAL MEDICINE

## 2018-09-10 PROCEDURE — 25000003 PHARM REV CODE 250: Mod: PO | Performed by: INTERNAL MEDICINE

## 2018-09-10 PROCEDURE — 45380 COLONOSCOPY AND BIOPSY: CPT | Mod: 59,,, | Performed by: INTERNAL MEDICINE

## 2018-09-10 PROCEDURE — 87045 FECES CULTURE AEROBIC BACT: CPT

## 2018-09-10 PROCEDURE — D9220A PRA ANESTHESIA: Mod: CRNA,,, | Performed by: NURSE ANESTHETIST, CERTIFIED REGISTERED

## 2018-09-10 PROCEDURE — 88305 TISSUE EXAM BY PATHOLOGIST: CPT | Mod: 26,,, | Performed by: PATHOLOGY

## 2018-09-10 PROCEDURE — 87324 CLOSTRIDIUM AG IA: CPT

## 2018-09-10 PROCEDURE — 88305 TISSUE EXAM BY PATHOLOGIST: CPT | Mod: 59 | Performed by: PATHOLOGY

## 2018-09-10 PROCEDURE — 45385 COLONOSCOPY W/LESION REMOVAL: CPT | Mod: ,,, | Performed by: INTERNAL MEDICINE

## 2018-09-10 PROCEDURE — D9220A PRA ANESTHESIA: Mod: ANES,,, | Performed by: ANESTHESIOLOGY

## 2018-09-10 PROCEDURE — 37000009 HC ANESTHESIA EA ADD 15 MINS: Mod: PO | Performed by: INTERNAL MEDICINE

## 2018-09-10 PROCEDURE — 45385 COLONOSCOPY W/LESION REMOVAL: CPT | Mod: PO | Performed by: INTERNAL MEDICINE

## 2018-09-10 PROCEDURE — 87427 SHIGA-LIKE TOXIN AG IA: CPT | Mod: 59

## 2018-09-10 PROCEDURE — 87493 C DIFF AMPLIFIED PROBE: CPT

## 2018-09-10 RX ORDER — PROPOFOL 10 MG/ML
VIAL (ML) INTRAVENOUS
Status: DISCONTINUED | OUTPATIENT
Start: 2018-09-10 | End: 2018-09-10

## 2018-09-10 RX ORDER — SODIUM CHLORIDE 0.9 % (FLUSH) 0.9 %
3 SYRINGE (ML) INJECTION
Status: DISCONTINUED | OUTPATIENT
Start: 2018-09-10 | End: 2018-09-10 | Stop reason: HOSPADM

## 2018-09-10 RX ORDER — SODIUM CHLORIDE, SODIUM LACTATE, POTASSIUM CHLORIDE, CALCIUM CHLORIDE 600; 310; 30; 20 MG/100ML; MG/100ML; MG/100ML; MG/100ML
INJECTION, SOLUTION INTRAVENOUS CONTINUOUS
Status: DISCONTINUED | OUTPATIENT
Start: 2018-09-10 | End: 2018-09-10 | Stop reason: HOSPADM

## 2018-09-10 RX ORDER — LIDOCAINE HCL/PF 100 MG/5ML
SYRINGE (ML) INTRAVENOUS
Status: DISCONTINUED | OUTPATIENT
Start: 2018-09-10 | End: 2018-09-10

## 2018-09-10 RX ADMIN — PROPOFOL 30 MG: 10 INJECTION, EMULSION INTRAVENOUS at 11:09

## 2018-09-10 RX ADMIN — SODIUM CHLORIDE, SODIUM LACTATE, POTASSIUM CHLORIDE, AND CALCIUM CHLORIDE: .6; .31; .03; .02 INJECTION, SOLUTION INTRAVENOUS at 11:09

## 2018-09-10 RX ADMIN — LIDOCAINE HYDROCHLORIDE 100 MG: 20 INJECTION, SOLUTION INTRAVENOUS at 11:09

## 2018-09-10 NOTE — DISCHARGE INSTRUCTIONS
Recovery After Procedural Sedation (Adult)  You have been given medicine by vein to make you sleep during your surgery. This may have included both a pain medicine and sleeping medicine. Most of the effects have worn off. But you may still have some drowsiness for the next 6 to 8 hours.  Home care  Follow these guidelines when you get home:  · For the next 8 hours, you should be watched by a responsible adult. This person should make sure your condition is not getting worse.  · Don't drink any alcohol for the next 24 hours.  · Don't drive, operate dangerous machinery, or make important business or personal decisions during the next 24 hours.  Note: Your healthcare provider may tell you not to take any medicine by mouth for pain or sleep in the next 4 hours. These medicines may react with the medicines you were given in the hospital. This could cause a much stronger response than usual.  Follow-up care  Follow up with your healthcare provider if you are not alert and back to your usual level of activity within 12 hours.  When to seek medical advice  Call your healthcare provider right away if any of these occur:  · Drowsiness gets worse  · Weakness or dizziness gets worse  · Repeated vomiting  · You can't be awakened   Date Last Reviewed: 10/18/2016  © 2062-3600 [employer]. [address] [place] [address], [place] [address]. All rights reserved. This information is not intended as a substitute for professional medical care. Always follow your healthcare professional's instructions.        PROBIOTICS:  Now that your colon is so cleaned out, now is a good time for a round of PROBIOTICS.  Take a Probiotic product such as Align or Culturelle or Erin-Q, every day for a month.                  (The products listed are non-prescription, but you may need to ask the pharmacist for their location.)  Repeat this at least 5-6 times a year.          High-Fiber Diet  Fiber is in fruits, vegetables, cereals, and grains.  Fiber passes through your body undigested. A high-fiber diet helps food move through your intestinal tract. The added bulk is helpful in preventing constipation. In people with diverticulosis, fiber helps clean out the pouches along the colon wall. It also prevents new pouches from forming. A high-fiber diet reduces the risk of colon cancer. It also lowers blood cholesterol and prevents high blood sugar in people with diabetes.    The fiber-rich foods listed below should be part of your diet. If you are not used to high-fiber foods, start with 1 or 2 foods from this list. Every 3 to 4 days add a new one to your diet. Do this until you are eating 4 high-fiber foods per day. This should give you 20 to 35 grams of fiber a day. It is also important to drink a lot of water when you are on this diet. You should have 6 to 8 glasses of water a day. Water makes the fiber swell and increases the benefit.  Foods high in dietary fiber  The following foods are high in dietary fiber:  · Breads. Breads made with 100% whole-wheat flour; jose, wheat, or rye crackers; whole-grain tortillas, bran muffins.  · Cereals. Whole-grain and bran cereals with bran (shredded wheat, wheat flakes, raisin bran, corn bran); oatmeal, rolled oats, granola, and brown rice.  · Fruits. Fresh fruits and their edible skins (pears, prunes, raisins, berries, apples, and apricots); bananas, citrus fruit, mangoes, pineapple; and prune juice.  · Nuts. Any nuts and seeds.  · Vegetables. Best served raw or lightly cooked. All types, especially: green peas, celery, eggplant, potatoes, spinach, broccoli, [place] sprouts, winter squash, carrots, cauliflower, soybeans, lentils, and fresh and dried beans of all kinds.  · Other. Popcorn, any spices.  Date Last Reviewed: 8/1/2016  © 0687-2513 [employer]. [address] [place] [address], [place] [address]. All rights reserved. This information is not intended as a substitute for professional medical care.  Always follow your healthcare professional's instructions.

## 2018-09-10 NOTE — ANESTHESIA PREPROCEDURE EVALUATION
09/10/2018  Brennan Bruner is a 48 y.o., male.    Anesthesia Evaluation    I have reviewed the Patient Summary Reports.    I have reviewed the Nursing Notes.   I have reviewed the Medications.     Review of Systems  Anesthesia Hx:  No problems with previous Anesthesia Denies Hx of Anesthetic complications    Social:  Non-Smoker    Cardiovascular:   Hypertension Denies MI.  Denies CAD.    Denies CABG/stent.   Denies Angina.    Pulmonary:   Denies COPD.  Denies Asthma.  Denies Recent URI.    Renal/:   Denies Chronic Renal Disease.     Hepatic/GI:   Denies GERD. Denies Liver Disease.    Neurological:   Denies TIA. Denies CVA. Denies Seizures.    Endocrine:   Denies Diabetes. Denies Hypothyroidism.    Psych:   Denies Psychiatric History.          Physical Exam  General:  Well nourished    Airway/Jaw/Neck:  Airway Findings: Mouth Opening: Normal Tongue: Normal  General Airway Assessment: Adult, Good  Mallampati: II  Improves to II with phonation.  TM Distance: 4-6 cm      Dental:  Dental Findings: In tact   Chest/Lungs:  Chest/Lungs Findings: Clear to auscultation, Normal Respiratory Rate     Heart/Vascular:  Heart Findings: Rate: Normal  Rhythm: Regular Rhythm  Sounds: Normal  Heart murmur: negative       Mental Status:  Mental Status Findings:  Cooperative, Alert and Oriented         Anesthesia Plan  Type of Anesthesia, risks & benefits discussed:  Anesthesia Type:  general  Patient's Preference:   Intra-op Monitoring Plan: standard ASA monitors  Intra-op Monitoring Plan Comments:   Post Op Pain Control Plan:   Post Op Pain Control Plan Comments:   Induction:    Beta Blocker:  Patient is not currently on a Beta-Blocker (No further documentation required).       Informed Consent: Patient understands risks and agrees with Anesthesia plan.  Questions answered. Anesthesia consent signed with patient.  ASA  Score: 1     Day of Surgery Review of History & Physical: I have interviewed and examined the patient. I have reviewed the patient's H&P dated:  There are no significant changes.          Ready For Surgery From Anesthesia Perspective.

## 2018-09-10 NOTE — TRANSFER OF CARE
Anesthesia Transfer of Care Note    Patient: Brennan Bruner    Procedure(s) Performed: Procedure(s) (LRB):  COLONOSCOPY (N/A)    Patient location: PACU    Anesthesia Type: general    Transport from OR: Transported from OR on room air with adequate spontaneous ventilation    Post pain: adequate analgesia    Post assessment: no apparent anesthetic complications and tolerated procedure well    Post vital signs: stable    Level of consciousness: awake and alert    Nausea/Vomiting: no nausea/vomiting    Complications: none    Transfer of care protocol was followed      Last vitals:   Visit Vitals  BP (!) 168/93 (BP Location: Right arm, Patient Position: Lying)   Pulse 81   Temp 36.8 °C (98.2 °F) (Skin)   Resp 16   SpO2 96%

## 2018-09-10 NOTE — BRIEF OP NOTE
Discharge Note  Short Stay      SUMMARY     Admit Date: 9/10/2018    Attending Physician: Gonzalo Andrea Jr., MD     Discharge Physician: Gonzalo Andrea Jr., MD    Discharge Date: 9/10/2018 12:28 PM    Final Diagnosis: Irregular bowel habits [R19.8]  Hematochezia [K92.1]  Diarrhea, unspecified type [R19.7]  Impression:          - One 3 mm polyp in the sigmoid colon at 30 cm                        proximal to the anus, removed with a cold snare.                        Resected and retrieved.                       - Diverticulosis in the sigmoid colon.                       - Mild colonic spasm consistent with irritable bowel                        syndrome.                       - Non-bleeding internal hemorrhoids.                       - The examination was otherwise normal.                       - The examined portion of the ileum was normal.                        Biopsied.  Recommendation:      - Discharge patient to home.                       - Await pathology results.                       - If the pathology report reveals adenomatous                        tissue, then repeat the colonoscopy for surveillance                        in 5 years.                       - If the pathology report indicates hyperplastic                        polyp, then repeat colonoscopy for screening                        purposes in 8 years.                       - High fiber diet.                       - Use fiber, for example Citrucel, Fibercon, Konsyl                        or Metamucil.                       - Take a PROBIOTIC, such as a carton of GREEK YOGURT                        (Chobani or Oikos, or Activia or Dannon); or tablets                        of ALIGN or CULTURELLE or ALLISON-Q (all                        non-prescription), every day for a month.                       - Continue present medications.                       - Use Donnatal 1-2 tabs PO TID - QID.                       - Imodium 1 caplet PO PRN  after loose bowel movement.                       - Call the [facility] clinic in 2 weeks for reports (if                        you haven't heard from us sooner) [phone].                       - Patient has a contact number available for                        emergencies. The signs and symptoms of potential                        delayed complications were discussed with the                        patient. Return to normal activities tomorrow.                        Written discharge instructions were provided to the                        patient.                       - Return to normal activities tomorrow.  Gonzalo Andrea MD  9/10/2018  Disposition: HOME OR SELF CARE    Patient Instructions:   Current Discharge Medication List      CONTINUE these medications which have NOT CHANGED    Details   atorvastatin (LIPITOR) 20 MG tablet Take 20 mg by mouth once daily.      atropine-phenobarbital-scopolamine-hyoscyamine (DONNATAL) 16.2-0.1037 -0.0194 mg Tab Take 1 tablet by mouth 3 (three) times daily as needed.      docusate sodium (COLACE) 50 MG capsule Take 50 mg by mouth every 12 (twelve) hours as needed for Constipation.      fluticasone (FLOVENT HFA) 220 mcg/actuation inhaler Swallow 2 puffs orally; do not inhale. Rinse mouth with water and spit after use; avoid eating/drinking for 30 minutes after use.  Qty: 12 g, Refills: 0    Associated Diagnoses: Eosinophilic esophagitis      hydrocodone-acetaminophen 5-325mg (NORCO) 5-325 mg per tablet Take 1 tablet by mouth every 6 (six) hours as needed for Pain.      loperamide (IMODIUM) 2 mg capsule Take 2 mg by mouth 4 (four) times daily as needed for Diarrhea.      methocarbamol (ROBAXIN) 500 MG Tab Take 500 mg by mouth 4 (four) times daily.      naproxen (NAPROSYN) 500 MG tablet Take 500 mg by mouth 2 (two) times daily.      ondansetron (ZOFRAN-ODT) 4 MG TbDL Take 1 tablet (4 mg total) by mouth every 6 (six) hours as needed.  Qty: 6 tablet, Refills: 3       pantoprazole (PROTONIX) 40 MG tablet Take 1 tablet (40 mg total) by mouth before breakfast.  Qty: 90 tablet, Refills: 4      promethazine (PHENERGAN) 25 MG tablet Take 25 mg by mouth every 4 to 6 hours as needed.      valsartan (DIOVAN) 160 MG tablet Take 160 mg by mouth once daily.             Discharge Procedure Orders (must include Diet, Follow-up, Activity)    Follow Up:  Follow up with PCP as per your routine.  Please follow a high fiber diet.  Activity as tolerated.    No driving day of procedure.    PROBIOTICS:  Now that your colon is so cleaned out, now is a good time for a round of PROBIOTICS.  Take a Probiotic product such as Align or Culturelle or Erin-Q, every day for a month.                  (The products listed are non-prescription, but you may need to ask the pharmacist for their location.)  Repeat this at least 5-6 times a year.

## 2018-09-10 NOTE — ANESTHESIA POSTPROCEDURE EVALUATION
Anesthesia Post Evaluation    Patient: Brennan Bruner    Procedure(s) Performed: Procedure(s) (LRB):  COLONOSCOPY (N/A)    Final Anesthesia Type: general  Patient location during evaluation: PACU  Patient participation: Yes- Able to Participate  Level of consciousness: awake and alert and oriented  Post-procedure vital signs: reviewed and stable  Pain management: adequate  Airway patency: patent  PONV status at discharge: No PONV  Anesthetic complications: no      Cardiovascular status: blood pressure returned to baseline  Respiratory status: unassisted, spontaneous ventilation and room air  Hydration status: euvolemic  Follow-up not needed.        Visit Vitals  /82 (BP Location: Left arm)   Pulse 79   Temp 36.3 °C (97.3 °F)   Resp 16   SpO2 98%       Pain/Jacqueline Score: Pain Assessment Performed: Yes (9/10/2018 12:22 PM)  Presence of Pain: denies (9/10/2018 12:22 PM)  Jacqueline Score: 10 (9/10/2018 12:22 PM)

## 2018-09-10 NOTE — [employer] PATIENT INSTRUCTIONS
Discharge Summary/Instructions for after Colonoscopy with   Biopsy/Polypectomy  Brennan Bruner    Monday, September 10, 2018  Gonzalo Andrea MD  RESTRICTIONS ON ACTIVITY:  - Do not drive a car or operate machinery until the day after the procedure.      - The following day: return to full activity including work.  - For  3 days: No heavy lifting, straining or running.  - Diet: You can have solid foods, but no gassy foods (i.e. beans, broccoli,   cabbage, etc).  TREATMENT FOR COMMON SIDE EFFECTS:  - Mild abdominal pain and bloating or excessive gas: rest, eat lightly and   use a heating pad.  SYMPTOMS TO WATCH FOR AND REPORT TO YOUR PHYSICIAN:  1. Severe abdominal pain.  2. Fever within 24 hours after a procedure.  3. A large amount of rectal bleeding. (A small amount of blood from the   rectum is not serious, especially if hemorrhoids are present.  3.  Because air was put into your colon during the procedure, expelling   large amounts of air from your rectum is normal.  4.  You may not have a bowel movement for 1-3 days because of the   colonoscopy prep.  This is normal.  5.  Call immediately if you notice any of the following:   Chills and/or fever over 101   Persistent vomiting   Severe abdominal pain, other than gas cramps   Severe chest pain   Black, tarry stools   Any bleeding - exceeding one tablespoon  Your doctor recommends these additional instructions:  We are waiting for your pathology results.   If the pathology report reveals adenomatous (precancerous) tissue, then your   physician recommends a repeat colonoscopy for surveillance in 5 years.   If the pathology report indicates a hyperplastic polyp, then the colonoscopy   will be repeated for screening purposes in 8-10 years.   Eat a high fiber diet.   Take a fiber supplement, for example Citrucel, Fibercon, Konsyl or   Metamucil.   Take a PROBIOTIC, such as a carton of GREEK YOGURT (Chobani or Oikos,  or   Activia or Dannon);  or tablets of  ALIGN or CULTURELLE or ALLISON-Q (all   non-prescription), every day for a month.   And repeat this 5-6 times a   year.  Continue your present medications.   Take Donnatal 1-2 tablets by mouth three to four times a day.   Take Imodium 1 caplet by mouth as needed after each loose bowel movement.  None  If you have any questions or problems, please call your physician.  EMERGENCY PHONE NUMBER: [phone]  LAB RESULTS: Call in two (2) weeks for lab results, [phone]  ___________________________________________  Nurse Signature  ___________________________________________  Patient/Designated Responsible Party Signature  Gonzalo Andrea MD  9/10/2018 12:26:55 PM  This report has been verified and signed electronically.  [employer]

## 2018-09-11 LAB
E COLI SXT1 STL QL IA: NEGATIVE
E COLI SXT2 STL QL IA: NEGATIVE
O+P STL TRI STN: NORMAL

## 2018-09-13 LAB — BACTERIA STL CULT: NORMAL

## 2018-12-20 ENCOUNTER — INITIAL CONSULT (OUTPATIENT)
Dept: NEUROSURGERY | Facility: CLINIC | Age: 48
End: 2018-12-20
Payer: OTHER GOVERNMENT

## 2018-12-20 ENCOUNTER — TELEPHONE (OUTPATIENT)
Dept: NEUROSURGERY | Facility: CLINIC | Age: 48
End: 2018-12-20

## 2018-12-20 VITALS
HEART RATE: 88 BPM | SYSTOLIC BLOOD PRESSURE: 159 MMHG | WEIGHT: 182.31 LBS | HEIGHT: 78 IN | BODY MASS INDEX: 21.09 KG/M2 | DIASTOLIC BLOOD PRESSURE: 95 MMHG

## 2018-12-20 DIAGNOSIS — G89.29 CHRONIC BILATERAL LOW BACK PAIN WITH BILATERAL SCIATICA: ICD-10-CM

## 2018-12-20 DIAGNOSIS — M54.42 CHRONIC BILATERAL LOW BACK PAIN WITH BILATERAL SCIATICA: ICD-10-CM

## 2018-12-20 DIAGNOSIS — M96.1 POST LAMINECTOMY SYNDROME: Primary | ICD-10-CM

## 2018-12-20 DIAGNOSIS — M54.41 CHRONIC BILATERAL LOW BACK PAIN WITH BILATERAL SCIATICA: ICD-10-CM

## 2018-12-20 DIAGNOSIS — M54.16 LUMBAR RADICULOPATHY: Primary | ICD-10-CM

## 2018-12-20 PROCEDURE — 99205 OFFICE O/P NEW HI 60 MIN: CPT | Mod: S$GLB,,, | Performed by: STUDENT IN AN ORGANIZED HEALTH CARE EDUCATION/TRAINING PROGRAM

## 2018-12-20 NOTE — TELEPHONE ENCOUNTER
Please contact and schedule the patient for bilateral L4/5 facet joint injections, follow-up with Dr. Mo afterwards.

## 2018-12-20 NOTE — PROGRESS NOTES
[facility] - Neurosurgery  Clinic Consult     Consult Requested By: Baudilio Bear  PCP: Jayde Fleming MD    SUBJECTIVE:     Chief Complaint:   Chief Complaint   Patient presents with    Lumbar Spine Pain (L-Spine)     patient reports low back pain for about 1 month; numbness and tingling left side; pain in the left leg, right groin; microdiscectomy l4-5 done in 2007; pain 6/10       History of Present Illness:  Brennan Bruner is a 48 y.o. male who presents for evaluation of acute exacerbation of chronic low back pain. 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 He reports a history of low back pain since 2004. He is s/p L4-5 microdiscectomy in 2007 and reports a residual left foot drop. He experienced acute exacerbation of low back pain following a hunting trip 4 weeks prior. He states the pain is located midline in the lower lumbar region. He describes it as a severe burning pain. He reports paresthesias in the left lateral leg. The pain is worse with hyperextension and left lateral flexion of the lumbar spine. He is unable to lift heavy objects due to the pain. He reports bad spasms in his back and feet since his initial injury in 2004. He reports acute flare ups throughout the years that would resolve on its own. He has tried to remain active throughout the years. He lives on [address] [place] [address] and usually walks his dogs daily. He has not been able to for the past 4 weeks due to his pain. He has tried steroids, pain medication, and muscle relaxants without relief. He has received multiple lumbar ESIs over the years, attended physical therapy, received chiropractic treatments, and wears a tens unit.     VAS 6/10 low back   PHQ 6  Oswestry Disability Index 52%    Pertinent and Recent history, provider evaluations, imaging and data reviewed in EPIC      Past Medical History:   Diagnosis Date    C. difficile diarrhea     Eosinophilic esophagitis     HTN (hypertension)      Past Surgical History:    Procedure Laterality Date    ANTERIOR CERVICAL DISCECTOMY W/ FUSION      BACK SURGERY      lumbar discectomy    COLONOSCOPY N/A 9/10/2018    Procedure: COLONOSCOPY;  Surgeon: Gonzalo Andrea Jr., MD;  Location: [facility];  Service: Endoscopy;  Laterality: N/A;    COLONOSCOPY N/A 9/10/2018    [facility]    ESOPHAGOGASTRODUODENOSCOPY (EGD) N/A 8/31/2017    Performed by [facility] Jr[facility] at [facility]    EYE SURGERY Bilateral     lasik    SHOULDER ARTHROSCOPY Right     UPPER GASTROINTESTINAL ENDOSCOPY  06/17/2013    [facility]- sent for scanning: stenosis s/p dilation, biopsied GEJ, gastritis, antritis (hard to read handwriting); biospy: antrum mild reactive gastropathy, negative h pylori; GEJ: active esophagitis with increased intraepithelial eosinophils; gastric type mucosa with chronic and active inflammation, no kwan's (no proximal esophagus biopsy done)    UPPER GASTROINTESTINAL ENDOSCOPY  08/31/2017    Dr. Andrea     Family History   Problem Relation Age of Onset    Colon cancer Neg Hx     Colon polyps Neg Hx     Crohn's disease Neg Hx     Ulcerative colitis Neg Hx     Stomach cancer Neg Hx     Esophageal cancer Neg Hx      Social History     Tobacco Use    Smoking status: Never Smoker    Smokeless tobacco: Never Used   Substance Use Topics    Alcohol use: Yes     Comment: socially    Drug use: No      Review of patient's allergies indicates:  No Known Allergies    Current Outpatient Medications:     atorvastatin (LIPITOR) 20 MG tablet, Take 20 mg by mouth once daily., Disp: , Rfl:     atropine-phenobarbital-scopolamine-hyoscyamine (DONNATAL) 16.2-0.1037 -0.0194 mg Tab, Take 1 tablet by mouth 3 (three) times daily as needed., Disp: , Rfl:     docusate sodium (COLACE) 50 MG capsule, Take 50 mg by mouth every 12 (twelve) hours as needed for Constipation., Disp: , Rfl:     fluticasone (FLOVENT HFA) 220 mcg/actuation inhaler, Swallow 2  puffs orally; do not inhale. Rinse mouth with water and spit after use; avoid eating/drinking for 30 minutes after use., Disp: 12 g, Rfl: 0    hydrocodone-acetaminophen 5-325mg (NORCO) 5-325 mg per tablet, Take 1 tablet by mouth every 6 (six) hours as needed for Pain., Disp: , Rfl:     lidocaine (LIDODERM) 5 %, Place 1 patch onto the skin once daily. Remove & Discard patch within 12 hours or as directed by MD, Disp: 30 patch, Rfl: 3    loperamide (IMODIUM) 2 mg capsule, Take 2 mg by mouth 4 (four) times daily as needed for Diarrhea., Disp: , Rfl:     methocarbamol (ROBAXIN) 500 MG Tab, Take 500 mg by mouth 4 (four) times daily., Disp: , Rfl:     naproxen (NAPROSYN) 500 MG tablet, Take 500 mg by mouth 2 (two) times daily., Disp: , Rfl:     ondansetron (ZOFRAN-ODT) 4 MG TbDL, Take 1 tablet (4 mg total) by mouth every 6 (six) hours as needed., Disp: 6 tablet, Rfl: 3    promethazine (PHENERGAN) 25 MG tablet, Take 25 mg by mouth every 4 to 6 hours as needed., Disp: , Rfl:     valsartan (DIOVAN) 160 MG tablet, Take 160 mg by mouth once daily., Disp: , Rfl:     pantoprazole (PROTONIX) 40 MG tablet, Take 1 tablet (40 mg total) by mouth before breakfast., Disp: 90 tablet, Rfl: 4    Review of Systems:   Constitutional: no fever, chills or night sweats. No changes in weight   Eyes: no visual changes   ENT: no nasal congestion or sore throat   Respiratory: no cough or shortness of breath   Cardiovascular: no chest pain or palpitations   Gastrointestinal: no nausea or vomiting   Genitourinary: no hematuria or dysuria   Integument/Breast: no rash or pruritis   Hematologic/Lymphatic: no easy bruising or lymphadenopathy   Musculoskeletal: +back pain  Neurological: no seizures or tremors +paresthesias   Behavioral/Psych: no auditory or visual hallucinations   Endocrine: no heat or cold intolerance         OBJECTIVE:     Vital Signs (Most Recent):  Pulse: 88 (12/20/18 1403)  BP: (!) 159/95 (12/20/18 1403)    Physical Exam:    General: well developed, well nourished, appears to be in pain   Neurologic: Alert and oriented. Thought content appropriate. GCS 15.   Head: normocephalic, atraumatic  Eyes: EOMI  Neck: trachea midline, no JVD   Cardiovascular: no LE edema  Pulmonary: normal respirations, no signs of respiratory distress  Abdomen: non-distended  Sensory: intact to light touch throughout  Skin: Skin is warm, dry and intact.    Motor Strength: Moves all extremities spontaneously with good tone. No abnormal movements seen.     Strength  Deltoids Triceps Biceps Wrist Extension Wrist Flexion Hand  Interossei   Upper: R 5/5 5/5 5/5 5/5 5/5 5/5 5/5    L 5/5 5/5 5/5 5/5 5/5 5/5 5/5     Iliopsoas Quadriceps Knee  Flexion Tibialis  anterior Gastro- cnemius EHL    Lower: R 5/5 5/5 5/5 5/5 5/5 5/5     L 5/5 5/5 5/5 5/5 5/5 5/5       Left  Right    Biceps DTR 2+ 2+   Brachioradialis DTR 2+ 2+   Patellar DTR 2+ 3+   Achilles DTR 2+ 2+   Fry Absent  Absent   Clonus Absent Absent     Gait: slow    Tandem Gait: unsteady            Able to walk on heels & toes  Lumbar Spine: decreased ROM secondary to pain, no TTP  Straight leg raise: positive left   SI Joint tenderness: Negative bilaterally           Diagnostic Results:  I have independently reviewed the following imaging:  MRI: Reviewed  moderate DDD, some modic changes lateral recess stenosis moderate at L4-5    ASSESSMENT/PLAN:     Post laminectomy syndrome    Chronic bilateral low back pain with bilateral sciatica        Brennan Bruner is a 48 y.o. male  12 yrs s/p L4-5 discectomy with residual radiculopathy and slight foot drop  On/off recurrence worsening of back pain over the years  Last eval 2012 discussed fusion  1 month of severe flare up. He is miserable with limited ADLs  He does not have severe 4-5 pathology on MRI but am suspicious this is a significant pain generator for him  Also suffering from myofascial pain and spasms  He is indicated for further workup     -  Ap/later flex/ex lumbar xrays  -CT L-spine eval facet fracture/ ddd  - Physical therapy eval and treatment  - Diagnostic and therapeutic L4-5 facet injections; referred to Dr. Pollack  F/u after for recs          Patient verbalized understanding of plan. Encouraged to call with any questions or concerns.     This note was partially dictated using voice recognition software, so please excuse any errors that were not corrected.

## 2018-12-21 ENCOUNTER — HOSPITAL ENCOUNTER (OUTPATIENT)
Dept: RADIOLOGY | Facility: HOSPITAL | Age: 48
Discharge: HOME | End: 2018-12-21
Attending: STUDENT IN AN ORGANIZED HEALTH CARE EDUCATION/TRAINING PROGRAM
Payer: OTHER GOVERNMENT

## 2018-12-21 DIAGNOSIS — M54.16 LUMBAR RADICULOPATHY: ICD-10-CM

## 2018-12-21 PROCEDURE — 72131 CT LUMBAR SPINE W/O DYE: CPT | Mod: TC,PO

## 2018-12-21 PROCEDURE — 72110 X-RAY EXAM L-2 SPINE 4/>VWS: CPT | Mod: TC,FY,PO

## 2018-12-21 PROCEDURE — 72110 X-RAY EXAM L-2 SPINE 4/>VWS: CPT | Mod: 26,,, | Performed by: RADIOLOGY

## 2018-12-21 PROCEDURE — 72131 CT LUMBAR SPINE W/O DYE: CPT | Mod: 26,,, | Performed by: RADIOLOGY

## 2019-01-22 ENCOUNTER — OFFICE VISIT (OUTPATIENT)
Dept: PAIN MEDICINE | Facility: CLINIC | Age: 49
End: 2019-01-22
Payer: OTHER GOVERNMENT

## 2019-01-22 VITALS
WEIGHT: 184.44 LBS | OXYGEN SATURATION: 99 % | RESPIRATION RATE: 18 BRPM | HEART RATE: 82 BPM | TEMPERATURE: 98 F | SYSTOLIC BLOOD PRESSURE: 132 MMHG | BODY MASS INDEX: 29.64 KG/M2 | DIASTOLIC BLOOD PRESSURE: 70 MMHG | HEIGHT: 66 IN

## 2019-01-22 DIAGNOSIS — M54.16 BILATERAL LUMBAR RADICULOPATHY: ICD-10-CM

## 2019-01-22 DIAGNOSIS — M47.816 LUMBAR SPONDYLOSIS: Primary | ICD-10-CM

## 2019-01-22 DIAGNOSIS — M51.36 DDD (DEGENERATIVE DISC DISEASE), LUMBAR: ICD-10-CM

## 2019-01-22 PROCEDURE — 99214 OFFICE O/P EST MOD 30 MIN: CPT | Mod: PBBFAC,PN | Performed by: ANESTHESIOLOGY

## 2019-01-22 PROCEDURE — 99213 OFFICE O/P EST LOW 20 MIN: CPT | Mod: S$PBB,,, | Performed by: ANESTHESIOLOGY

## 2019-01-22 PROCEDURE — 99999 PR PBB SHADOW E&M-EST. PATIENT-LVL IV: ICD-10-PCS | Mod: PBBFAC,,, | Performed by: ANESTHESIOLOGY

## 2019-01-22 PROCEDURE — 99213 PR OFFICE/OUTPT VISIT, EST, LEVL III, 20-29 MIN: ICD-10-PCS | Mod: S$PBB,,, | Performed by: ANESTHESIOLOGY

## 2019-01-22 PROCEDURE — 99999 PR PBB SHADOW E&M-EST. PATIENT-LVL IV: CPT | Mod: PBBFAC,,, | Performed by: ANESTHESIOLOGY

## 2019-01-22 RX ORDER — SODIUM CHLORIDE, SODIUM LACTATE, POTASSIUM CHLORIDE, CALCIUM CHLORIDE 600; 310; 30; 20 MG/100ML; MG/100ML; MG/100ML; MG/100ML
INJECTION, SOLUTION INTRAVENOUS CONTINUOUS
Status: CANCELLED | OUTPATIENT
Start: 2019-02-01

## 2019-01-22 NOTE — H&P (VIEW-ONLY)
This note was completed with dictation software and grammatical errors may exist.    CC: Back pain    HPI:  The patient is a 48-year-old man with a history of lumbar DDD status post L4/5 diskectomy who presents in referral from Dr. Gabriel Mo for increasing back pain. The patient reports being in  service over the years, numerous traumas and has had low back pain since 2002.  He has done numerous injections over the years, physical therapy but in 2007 developed severe left leg pain and left foot drop and underwent L4/5 diskectomy.  The pain improved and gradually his left foot drop improved.  Since that time he has had some low back pain and has gone through occasional injections, does exercises.  He has been taking tizanidin and Naprosyn with some relief, generally takes hydrocodone one to 2 times a week.  However, since November he has begun to have severe pain in the low back into the left buttock but also into the right testicle.  He has some chronic numbness and tingling along the left lateral lower leg.  He denies any new numbness.  He has been going to physical therapy recently at [facility].  He denies any bowel or bladder incontinence.    Pain intervention history:  He has had numerous injections with some relief in the past.  He has tried Neurontin and Lyrica with side effects and no benefit.  For years he has been taking hydrocodone one to 2 times a week, but lately has had to take this more secondary to severe pain.  He takes tizanidine several times a week, takes Naprosyn with some benefit.    ROS:  He reports abdominal pain, joint stiffness, back pain and difficulty sleeping.  Balance of review of systems is negative.    Past Medical History:   Diagnosis Date    C. difficile diarrhea     Eosinophilic esophagitis     HTN (hypertension)        Past Surgical History:   Procedure Laterality Date    ANTERIOR CERVICAL DISCECTOMY W/ FUSION      BACK SURGERY      lumbar discectomy     "COLONOSCOPY N/A 9/10/2018    [facility]    ESOPHAGOGASTRODUODENOSCOPY (EGD) N/A 8/31/2017    [facility]    EYE SURGERY Bilateral     lasik    SHOULDER ARTHROSCOPY Right     UPPER GASTROINTESTINAL ENDOSCOPY  06/17/2013    [place]- sent for scanning: stenosis s/p dilation, biopsied GEJ, gastritis, antritis (hard to read handwriting); biospy: antrum mild reactive gastropathy, negative h pylori; GEJ: active esophagitis with increased intraepithelial eosinophils; gastric type mucosa with chronic and active inflammation, no kwan's (no proximal esophagus biopsy done)    UPPER GASTROINTESTINAL ENDOSCOPY  08/31/2017    Dr. Andrea       Social History     Socioeconomic History    Marital status:      Spouse name: None    Number of children: None    Years of education: None    Highest education level: None   Social Needs    Financial resource strain: None    Food insecurity - worry: None    Food insecurity - inability: None    Transportation needs - medical: None    Transportation needs - non-medical: None   Occupational History    None   Tobacco Use    Smoking status: Never Smoker    Smokeless tobacco: Never Used   Substance and Sexual Activity    Alcohol use: Yes     Comment: socially    Drug use: No    Sexual activity: None   Other Topics Concern    None   Social History Narrative    Occupation: FNP         Medications/Allergies: See med card    Vitals:    01/22/19 0858   BP: 132/70   Pulse: 82   Resp: 18   Temp: 97.6 °F (36.4 °C)   TempSrc: Oral   SpO2: 99%   Weight: 83.7 kg (184 lb 6.6 oz)   Height: 5' 6" (1.676 m)   PainSc:   6   PainLoc: Back         Physical exam:  Gen: A and O x3, pleasant, well-groomed  Skin: No rashes or obvious lesions  HEENT: PERRLA, no obvious deformities on ears or in canals. Trachea midline.  CVS: Regular rate and rhythm, normal palpable pulses.  Resp:No increased work of " breathing, symmetrical chest rise.  Abdomen: Soft, NT/ND.  Musculoskeletal:  No antalgic gait.     Neuro:  Lower extremities: 5/5 strength bilaterally  Reflexes: Patellar 2+, Achilles 1+ right side, 0+ left side.  Sensory:  Intact and symmetrical to light touch and pinprick in L2-S1 dermatomes bilaterally, except for decreased sensation to light touch along the left lateral lower leg.    Lumbar spine:  Lumbar spine:  Range of motion is mildly reduced with flexion with no increased pain, moderately reduced with extension with increased pain especially with left greater than right oblique extension  Brennan's test causes no increased pain on either side.    Supine straight leg raise is negative bilaterally.    Internal and external rotation of the hip causes no increased pain on either side.  Myofascial exam: No tenderness to palpation across lumbar paraspinous muscles.    Imaging:  MRI lumbar spine 12/13/2018:  T12/L1 through L2/3:  No significant disc bulging, foraminal narrowing.  At L3/4 there is mild disc bulging and facet arthropathy, mild neural foraminal narrowing.  At L4/5 there is posterior disc bulge with annular fissure slightly more to the right causing right lateral recess narrowing.  There is bilateral facet arthropathy and mild foraminal narrowing bilaterally.  At L5/S1 there is mild circumferential disc bulge with central protrusion, mild facet hypertrophy greater on the right.  No canal narrowing, there is mild neural foraminal narrowing left greater than right.    Assessment:  The patient is a 48-year-old man with a history of lumbar DDD status post L4/5 diskectomy who presents in referral from Dr. Gabriel Mo for increasing back pain.    1. Lumbar spondylosis  Vital signs    Place 18-22 [facility] IV     Verify informed consent    Notify physician     Notify physician     Notify physician (specify)    Diet NPO    Case Request Operating Room: INJECTION, FACET JOINT L4/5 and L5/S1    Place in  Outpatient    lactated ringers infusion   2. DDD (degenerative disc disease), lumbar     3. Bilateral lumbar radiculopathy           Plan:  1.  We reviewed his MRI, he has some mild radicular symptoms but those do not seem like they have necessarily worsened, this does seem to be more facet mediated pain. He has been sent for possible facet joint injections for diagnostic and therapeutic purposes.  I am going to set him up for bilateral L4/5 facet joint injection and have him follow up in several weeks after the injection. We discussed that if this eliminates his back pain but he is still having significant buttock or testicular pain, we may consider epidural steroid injections.    Thank you for referring this interesting patient, and I look forward to continuing to collaborate in his care.

## 2019-01-22 NOTE — PROGRESS NOTES
This note was completed with dictation software and grammatical errors may exist.    CC: Back pain    HPI:  The patient is a 48-year-old man with a history of lumbar DDD status post L4/5 diskectomy who presents in referral from Dr. Gabriel Mo for increasing back pain. The patient reports being in  service over the years, numerous traumas and has had low back pain since 2002.  He has done numerous injections over the years, physical therapy but in 2007 developed severe left leg pain and left foot drop and underwent L4/5 diskectomy.  The pain improved and gradually his left foot drop improved.  Since that time he has had some low back pain and has gone through occasional injections, does exercises.  He has been taking tizanidin and Naprosyn with some relief, generally takes hydrocodone one to 2 times a week.  However, since November he has begun to have severe pain in the low back into the left buttock but also into the right testicle.  He has some chronic numbness and tingling along the left lateral lower leg.  He denies any new numbness.  He has been going to physical therapy recently at [facility].  He denies any bowel or bladder incontinence.    Pain intervention history:  He has had numerous injections with some relief in the past.  He has tried Neurontin and Lyrica with side effects and no benefit.  For years he has been taking hydrocodone one to 2 times a week, but lately has had to take this more secondary to severe pain.  He takes tizanidine several times a week, takes Naprosyn with some benefit.    ROS:  He reports abdominal pain, joint stiffness, back pain and difficulty sleeping.  Balance of review of systems is negative.    Past Medical History:   Diagnosis Date    C. difficile diarrhea     Eosinophilic esophagitis     HTN (hypertension)        Past Surgical History:   Procedure Laterality Date    ANTERIOR CERVICAL DISCECTOMY W/ FUSION      BACK SURGERY      lumbar discectomy     "COLONOSCOPY N/A 9/10/2018    [facility]    ESOPHAGOGASTRODUODENOSCOPY (EGD) N/A 8/31/2017    [facility]    EYE SURGERY Bilateral     lasik    SHOULDER ARTHROSCOPY Right     UPPER GASTROINTESTINAL ENDOSCOPY  06/17/2013    [place]- sent for scanning: stenosis s/p dilation, biopsied GEJ, gastritis, antritis (hard to read handwriting); biospy: antrum mild reactive gastropathy, negative h pylori; GEJ: active esophagitis with increased intraepithelial eosinophils; gastric type mucosa with chronic and active inflammation, no kwan's (no proximal esophagus biopsy done)    UPPER GASTROINTESTINAL ENDOSCOPY  08/31/2017    Dr. Andrea       Social History     Socioeconomic History    Marital status:      Spouse name: None    Number of children: None    Years of education: None    Highest education level: None   Social Needs    Financial resource strain: None    Food insecurity - worry: None    Food insecurity - inability: None    Transportation needs - medical: None    Transportation needs - non-medical: None   Occupational History    None   Tobacco Use    Smoking status: Never Smoker    Smokeless tobacco: Never Used   Substance and Sexual Activity    Alcohol use: Yes     Comment: socially    Drug use: No    Sexual activity: None   Other Topics Concern    None   Social History Narrative    Occupation: FNP         Medications/Allergies: See med card    Vitals:    01/22/19 0858   BP: 132/70   Pulse: 82   Resp: 18   Temp: 97.6 °F (36.4 °C)   TempSrc: Oral   SpO2: 99%   Weight: 83.7 kg (184 lb 6.6 oz)   Height: 5' 6" (1.676 m)   PainSc:   6   PainLoc: Back         Physical exam:  Gen: A and O x3, pleasant, well-groomed  Skin: No rashes or obvious lesions  HEENT: PERRLA, no obvious deformities on ears or in canals. Trachea midline.  CVS: Regular rate and rhythm, normal palpable pulses.  Resp:No increased work of " breathing, symmetrical chest rise.  Abdomen: Soft, NT/ND.  Musculoskeletal:  No antalgic gait.     Neuro:  Lower extremities: 5/5 strength bilaterally  Reflexes: Patellar 2+, Achilles 1+ right side, 0+ left side.  Sensory:  Intact and symmetrical to light touch and pinprick in L2-S1 dermatomes bilaterally, except for decreased sensation to light touch along the left lateral lower leg.    Lumbar spine:  Lumbar spine:  Range of motion is mildly reduced with flexion with no increased pain, moderately reduced with extension with increased pain especially with left greater than right oblique extension  Brennan's test causes no increased pain on either side.    Supine straight leg raise is negative bilaterally.    Internal and external rotation of the hip causes no increased pain on either side.  Myofascial exam: No tenderness to palpation across lumbar paraspinous muscles.    Imaging:  MRI lumbar spine 12/13/2018:  T12/L1 through L2/3:  No significant disc bulging, foraminal narrowing.  At L3/4 there is mild disc bulging and facet arthropathy, mild neural foraminal narrowing.  At L4/5 there is posterior disc bulge with annular fissure slightly more to the right causing right lateral recess narrowing.  There is bilateral facet arthropathy and mild foraminal narrowing bilaterally.  At L5/S1 there is mild circumferential disc bulge with central protrusion, mild facet hypertrophy greater on the right.  No canal narrowing, there is mild neural foraminal narrowing left greater than right.    Assessment:  The patient is a 48-year-old man with a history of lumbar DDD status post L4/5 diskectomy who presents in referral from Dr. Gabriel Mo for increasing back pain.    1. Lumbar spondylosis  Vital signs    Place 18-22 [facility] IV     Verify informed consent    Notify physician     Notify physician     Notify physician (specify)    Diet NPO    Case Request Operating Room: INJECTION, FACET JOINT L4/5 and L5/S1    Place in  Outpatient    lactated ringers infusion   2. DDD (degenerative disc disease), lumbar     3. Bilateral lumbar radiculopathy           Plan:  1.  We reviewed his MRI, he has some mild radicular symptoms but those do not seem like they have necessarily worsened, this does seem to be more facet mediated pain. He has been sent for possible facet joint injections for diagnostic and therapeutic purposes.  I am going to set him up for bilateral L4/5 facet joint injection and have him follow up in several weeks after the injection. We discussed that if this eliminates his back pain but he is still having significant buttock or testicular pain, we may consider epidural steroid injections.    Thank you for referring this interesting patient, and I look forward to continuing to collaborate in his care.

## 2019-01-22 NOTE — LETTER
January 27, 2019      Gabriel Mo MD  [address] [facility] [address] [place] [address]           [facility]  [address] [facility] [address] [place] [address]  Phone: [phone]  Fax: [phone]          Patient: Brennan Bruner   MR Number: [id number]   YOB: 1970   Date of Visit: 1/22/2019       Dear Dr. Gabriel Mo:    Thank you for referring Brennan Bruner to me for evaluation. Attached you will find relevant portions of my assessment and plan of care.    If you have questions, please do not hesitate to call me. I look forward to following Brennan Bruner along with you.    Sincerely,    Trip Pollack MD    Enclosure  CC:  No Recipients    If you would like to receive this communication electronically, please contact [email or web address] or [phone] to request more information on [employer] Link access.    For providers and/or their staff who would like to refer a patient to [facility], please contact us through our one-stop-shop provider referral line, [facility], at [phone].    If you feel you have received this communication in error or would no longer like to receive these types of communications, please e-mail [email or web address]

## 2019-01-28 RX ORDER — TIZANIDINE HYDROCHLORIDE 4 MG/1
1 CAPSULE, GELATIN COATED ORAL
COMMUNITY

## 2019-01-31 DIAGNOSIS — M51.36 DDD (DEGENERATIVE DISC DISEASE), LUMBAR: Primary | ICD-10-CM

## 2019-02-01 ENCOUNTER — HOSPITAL ENCOUNTER (OUTPATIENT)
Dept: RADIOLOGY | Facility: HOSPITAL | Age: 49
Discharge: HOME | End: 2019-02-01
Attending: ANESTHESIOLOGY
Payer: OTHER GOVERNMENT

## 2019-02-01 ENCOUNTER — HOSPITAL ENCOUNTER (OUTPATIENT)
Facility: HOSPITAL | Age: 49
Discharge: HOME | End: 2019-02-01
Attending: ANESTHESIOLOGY | Admitting: ANESTHESIOLOGY
Payer: OTHER GOVERNMENT

## 2019-02-01 VITALS
OXYGEN SATURATION: 100 % | HEART RATE: 78 BPM | BODY MASS INDEX: 28.12 KG/M2 | WEIGHT: 175 LBS | SYSTOLIC BLOOD PRESSURE: 131 MMHG | HEIGHT: 66 IN | DIASTOLIC BLOOD PRESSURE: 82 MMHG | TEMPERATURE: 98 F | RESPIRATION RATE: 16 BRPM

## 2019-02-01 DIAGNOSIS — M47.816 LUMBAR SPONDYLOSIS: Primary | ICD-10-CM

## 2019-02-01 DIAGNOSIS — M51.36 DDD (DEGENERATIVE DISC DISEASE), LUMBAR: ICD-10-CM

## 2019-02-01 PROCEDURE — 99152 MOD SED SAME PHYS/QHP 5/>YRS: CPT | Mod: ,,, | Performed by: ANESTHESIOLOGY

## 2019-02-01 PROCEDURE — 64493 PR INJ DX/THER AGNT PARAVERT FACET JOINT,IMG GUIDE,LUMBAR/SAC,1ST LVL: ICD-10-PCS | Mod: 50,,, | Performed by: ANESTHESIOLOGY

## 2019-02-01 PROCEDURE — 64493 INJ PARAVERT F JNT L/S 1 LEV: CPT | Mod: 50,,, | Performed by: ANESTHESIOLOGY

## 2019-02-01 PROCEDURE — 99152 PR MOD CONSCIOUS SEDATION, SAME PHYS, 5+ YRS, FIRST 15 MIN: ICD-10-PCS | Mod: ,,, | Performed by: ANESTHESIOLOGY

## 2019-02-01 PROCEDURE — 25000003 PHARM REV CODE 250: Mod: PO | Performed by: ANESTHESIOLOGY

## 2019-02-01 PROCEDURE — 64494 PR INJ DX/THER AGNT PARAVERT FACET JOINT,IMG GUIDE,LUMBAR/SAC, 2ND LEVEL: ICD-10-PCS | Mod: 50,,, | Performed by: ANESTHESIOLOGY

## 2019-02-01 PROCEDURE — 64493 INJ PARAVERT F JNT L/S 1 LEV: CPT | Mod: 50,PO | Performed by: ANESTHESIOLOGY

## 2019-02-01 PROCEDURE — 64494 INJ PARAVERT F JNT L/S 2 LEV: CPT | Mod: 50,,, | Performed by: ANESTHESIOLOGY

## 2019-02-01 PROCEDURE — 25500020 PHARM REV CODE 255: Mod: PO | Performed by: ANESTHESIOLOGY

## 2019-02-01 PROCEDURE — 76000 FLUOROSCOPY <1 HR PHYS/QHP: CPT | Mod: TC,PO

## 2019-02-01 PROCEDURE — 64494 INJ PARAVERT F JNT L/S 2 LEV: CPT | Mod: 50,PO | Performed by: ANESTHESIOLOGY

## 2019-02-01 PROCEDURE — 63600175 PHARM REV CODE 636 W HCPCS: Mod: PO | Performed by: ANESTHESIOLOGY

## 2019-02-01 RX ORDER — MIDAZOLAM HYDROCHLORIDE 2 MG/2ML
INJECTION, SOLUTION INTRAMUSCULAR; INTRAVENOUS
Status: DISCONTINUED | OUTPATIENT
Start: 2019-02-01 | End: 2019-02-01 | Stop reason: HOSPADM

## 2019-02-01 RX ORDER — BUPIVACAINE HYDROCHLORIDE 2.5 MG/ML
INJECTION, SOLUTION EPIDURAL; INFILTRATION; INTRACAUDAL
Status: DISCONTINUED | OUTPATIENT
Start: 2019-02-01 | End: 2019-02-01 | Stop reason: HOSPADM

## 2019-02-01 RX ORDER — LIDOCAINE HYDROCHLORIDE 10 MG/ML
INJECTION, SOLUTION EPIDURAL; INFILTRATION; INTRACAUDAL; PERINEURAL
Status: DISCONTINUED | OUTPATIENT
Start: 2019-02-01 | End: 2019-02-01 | Stop reason: HOSPADM

## 2019-02-01 RX ORDER — LIDOCAINE HYDROCHLORIDE 10 MG/ML
1 INJECTION INFILTRATION; PERINEURAL ONCE
Status: COMPLETED | OUTPATIENT
Start: 2019-02-01 | End: 2019-02-01

## 2019-02-01 RX ORDER — SODIUM CHLORIDE, SODIUM LACTATE, POTASSIUM CHLORIDE, CALCIUM CHLORIDE 600; 310; 30; 20 MG/100ML; MG/100ML; MG/100ML; MG/100ML
INJECTION, SOLUTION INTRAVENOUS CONTINUOUS
Status: DISCONTINUED | OUTPATIENT
Start: 2019-02-01 | End: 2019-02-01 | Stop reason: HOSPADM

## 2019-02-01 RX ORDER — METHYLPREDNISOLONE ACETATE 80 MG/ML
INJECTION, SUSPENSION INTRA-ARTICULAR; INTRALESIONAL; INTRAMUSCULAR; SOFT TISSUE
Status: DISCONTINUED | OUTPATIENT
Start: 2019-02-01 | End: 2019-02-01 | Stop reason: HOSPADM

## 2019-02-01 RX ADMIN — LIDOCAINE HYDROCHLORIDE 1 ML: 10 INJECTION, SOLUTION EPIDURAL; INFILTRATION; INTRACAUDAL; PERINEURAL at 01:02

## 2019-02-01 RX ADMIN — SODIUM CHLORIDE, SODIUM LACTATE, POTASSIUM CHLORIDE, AND CALCIUM CHLORIDE: .6; .31; .03; .02 INJECTION, SOLUTION INTRAVENOUS at 01:02

## 2019-02-01 NOTE — OP NOTE
PROCEDURE DATE: 2/1/2019    PROCEDURE: Bilateral side L4/5 and L5/S1 facet joint injection under fluoroscopy.    Diagnosis: Lumbar spondylosis    PHYSICIAN: Trip Pollack MD    MEDICATIONS INJECTED:  From a mixture of 3 ml of 0.25% bupivicaine and 80mg of methylprednisone, 0.9ml of solution was injected into each facet joint.    LOCAL ANESTHETIC USED:   Lidocaine 1%, 4 ml per level.    SEDATION MEDICATIONS: 4mg versed    ESTIMATED BLOOD LOSS:  non    COMPLICATIONS:  none    TECHNIQUE:   A time-out was taken to identify the patient and procedure side prior to starting the procedure.  Lying in a prone position, the patient was prepped and draped in the usual sterile fashion using ChloraPrep and fenestrated drape.  The levels were determined under fluoroscopic guidance in the AP view and then rotated into the oblique view to visualize the joint space.  Local anesthetic was given by raising a wheal at the skin and then anesthetizing a tract using a 25-gauge, 1.5inch needle.  A 3.5 in 2-gauge needle was introduced into the right and then left L4/5 and L5/S1 facet joints.  Negative pressure applied to make sure that there was no intravascular placement.  Omnipaque contrast was injected to confirm placement and to confirm arthrogram and no vascular uptake.  Medication was then injected slowly.  The patient tolerated the procedure well.    The patient was monitored after the procedure.  Patient was given post procedure and discharge instructions to follow at home. The patient was discharged in a stable condition

## 2019-02-01 NOTE — DISCHARGE SUMMARY
[facility]  Discharge Note  Short Stay    Admit Date: 2/1/2019    Discharge Date: 2/1/2019    Attending Physician: Trip Pollack MD     Discharge Provider: Trip Pollack    Diagnoses:  Active Hospital Problems    Diagnosis  POA    *Lumbar spondylosis [M47.816]  Yes      Resolved Hospital Problems   No resolved problems to display.       Discharged Condition: good    Final Diagnoses: Lumbar spondylosis [M47.816]    Disposition: Home or Self Care    Hospital Course: no complications, uneventful    Outcome of Hospitalization, Treatment, Procedure, or Surgery:  Patient was admitted for outpatient procedure. The patient underwent procedure without complications and are discharged home    Follow up/Patient Instructions:  Follow up as scheduled in Pain Management clinic in 3-4 weeks/Patient has received instructions and follow up date and time    Medications:  Continue previous medications    Discharge Procedure Orders   Call MD for:  temperature >100.4     Call MD for:  severe uncontrolled pain     Call MD for:  redness, tenderness, or signs of infection (pain, swelling, redness, odor or green/yellow discharge around incision site)     Call MD for:  severe persistent headache     No dressing needed         Discharge Procedure Orders (must include Diet, Follow-up, Activity):   Discharge Procedure Orders (must include Diet, Follow-up, Activity)   Call MD for:  temperature >100.4     Call MD for:  severe uncontrolled pain     Call MD for:  redness, tenderness, or signs of infection (pain, swelling, redness, odor or green/yellow discharge around incision site)     Call MD for:  severe persistent headache     No dressing needed

## 2019-02-01 NOTE — DISCHARGE INSTRUCTIONS
Home care instructions  Apply ice pack to the injection site for 20 minutes periods for the first 24 hrs for soreness/discomfort at injection site DO NOT USE HEAT FOR 24 HOURS  Keep site clean and dry for 24 hours, remove bandaid when desired  Do not drive until tomorrow  Take care when walking after a lumbar injection  Avoid strenuous activities for 2 days  Make take 2 weeks to feel the full effects   Resume home medication as prescribed today  Resume Aspirin, Plavix, or Coumadin the day after the procedure unless otherwise instructed.    SEE IMMEDIATE MEDICAL HELP FOR:  Severe increase in your usual pain or appearance of new pain  Prolonged or increasing weakness or numbness in the legs or arms  Drainage, redness, active bleeding, or increased swelling at the injection site  Temperature over 100.0 degrees F.  Headache that increases when your head is upright and decreases when you lie flat    CALL 911 OR GO DIRECTLY TO EMERGENCY DEPARTMENT FOR:  Shortness of breath, chest pain, or problems breathing        Recovery After Procedural Sedation (Adult)  You have been given medicine by vein to make you sleep during your surgery. This may have included both a pain medicine and sleeping medicine. Most of the effects have worn off. But you may still have some drowsiness for the next 6 to 8 hours.  Home care  Follow these guidelines when you get home:  · For the next 8 hours, you should be watched by a responsible adult. This person should make sure your condition is not getting worse.  · Don't drink any alcohol for the next 24 hours.  · Don't drive, operate dangerous machinery, or make important business or personal decisions during the next 24 hours.  Note: Your healthcare provider may tell you not to take any medicine by mouth for pain or sleep in the next 4 hours. These medicines may react with the medicines you were given in the hospital. This could cause a much stronger response than usual.  Follow-up care  Follow up  with your healthcare provider if you are not alert and back to your usual level of activity within 12 hours.  When to seek medical advice  Call your healthcare provider right away if any of these occur:  · Drowsiness gets worse  · Weakness or dizziness gets worse  · Repeated vomiting  · You can't be awakened   Date Last Reviewed: 10/18/2016  © 4729-4734 [employer]. [address] [place] [address], [place] [address]. All rights reserved. This information is not intended as a substitute for professional medical care. Always follow your healthcare professional's instructions.

## 2019-02-11 ENCOUNTER — TELEPHONE (OUTPATIENT)
Dept: GASTROENTEROLOGY | Facility: CLINIC | Age: 49
End: 2019-02-11

## 2019-02-11 NOTE — TELEPHONE ENCOUNTER
----- Message from Carri Thomas sent at 2/11/2019 10:45 AM CST -----  Type:  Pharmacy Calling to Clarify an RX    Name of Caller:  [facility]  Pharmacy Name:  [facility]  Prescription Name:  Flovent HSA Inhaler 220 micro grams  What do they need to clarify?:  New Prescription  Best Call Back Number:  [phone] Ref# [id number]  Additional Information: This was prescribed by the patient himself

## 2019-02-26 ENCOUNTER — OFFICE VISIT (OUTPATIENT)
Dept: PAIN MEDICINE | Facility: CLINIC | Age: 49
End: 2019-02-26
Payer: OTHER GOVERNMENT

## 2019-02-26 VITALS
BODY MASS INDEX: 28.04 KG/M2 | WEIGHT: 178.69 LBS | HEART RATE: 105 BPM | HEIGHT: 67 IN | SYSTOLIC BLOOD PRESSURE: 123 MMHG | DIASTOLIC BLOOD PRESSURE: 83 MMHG

## 2019-02-26 DIAGNOSIS — M51.36 DDD (DEGENERATIVE DISC DISEASE), LUMBAR: ICD-10-CM

## 2019-02-26 DIAGNOSIS — M47.816 LUMBAR SPONDYLOSIS: Primary | ICD-10-CM

## 2019-02-26 PROCEDURE — 99213 PR OFFICE/OUTPT VISIT, EST, LEVL III, 20-29 MIN: ICD-10-PCS | Mod: S$PBB,,, | Performed by: ANESTHESIOLOGY

## 2019-02-26 PROCEDURE — 99213 OFFICE O/P EST LOW 20 MIN: CPT | Mod: S$PBB,,, | Performed by: ANESTHESIOLOGY

## 2019-02-26 PROCEDURE — 99213 OFFICE O/P EST LOW 20 MIN: CPT | Mod: PBBFAC,PN | Performed by: ANESTHESIOLOGY

## 2019-02-26 PROCEDURE — 99999 PR PBB SHADOW E&M-EST. PATIENT-LVL III: ICD-10-PCS | Mod: PBBFAC,,, | Performed by: ANESTHESIOLOGY

## 2019-02-26 PROCEDURE — 99999 PR PBB SHADOW E&M-EST. PATIENT-LVL III: CPT | Mod: PBBFAC,,, | Performed by: ANESTHESIOLOGY

## 2019-02-26 NOTE — H&P (VIEW-ONLY)
This note was completed with dictation software and grammatical errors may exist.    CC: Back pain    HPI:  The patient is a 49-year-old man with a history of lumbar DDD status post L4/5 diskectomy who presents in referral from Dr. Gabriel Mo for increasing back pain.     Previous history:  The patient reports being in  service over the years, numerous traumas and has had low back pain since 2002.  He has done numerous injections over the years, physical therapy but in 2007 developed severe left leg pain and left foot drop and underwent L4/5 diskectomy.  The pain improved and gradually his left foot drop improved.  Since that time he has had some low back pain and has gone through occasional injections, does exercises.  He has been taking tizanidin and Naprosyn with some relief, generally takes hydrocodone one to 2 times a week.  However, since November he has begun to have severe pain in the low back into the left buttock but also into the right testicle.  He has some chronic numbness and tingling along the left lateral lower leg.  He denies any new numbness.  He has been going to physical therapy recently at [facility] in [place].  He denies any bowel or bladder incontinence.    Pain intervention history:  He has had numerous injections with some relief in the past.  He has tried Neurontin and Lyrica with side effects and no benefit.  For years he has been taking hydrocodone one to 2 times a week, but lately has had to take this more secondary to severe pain.  He takes tizanidine several times a week, takes Naprosyn with some benefit. He is status post bilateral L4/5 and L5/S1 facet joint injections on 02/01/2019 with greater than 50% relief.     ROS:  He reports abdominal pain, joint stiffness, back pain and difficulty sleeping.  Balance of review of systems is negative.    Past Medical History:   Diagnosis Date    Arthritis     C. difficile diarrhea     Eosinophilic esophagitis     HTN (hypertension)   "      Past Surgical History:   Procedure Laterality Date    ANTERIOR CERVICAL DISCECTOMY W/ FUSION      BACK SURGERY      lumbar discectomy    COLONOSCOPY N/A 9/10/2018    [facility] Gonzalo [facility] Jr[facility]    ESOPHAGOGASTRODUODENOSCOPY (EGD) N/A 8/31/2017    [facility] Jr[facility] at [facility] ENDO    EYE SURGERY Bilateral     lasik    INJECTION, FACET JOINT L4/5 and L5/S1 Bilateral 2/1/2019    Performed by Trip Pollack MD at [facility] OR    SHOULDER ARTHROSCOPY Right     UPPER GASTROINTESTINAL ENDOSCOPY  06/17/2013    Vicky Thompson- sent for scanning: stenosis s/p dilation, biopsied GEJ, gastritis, antritis (hard to read handwriting); biospy: antrum mild reactive gastropathy, negative h pylori; GEJ: active esophagitis with increased intraepithelial eosinophils; gastric type mucosa with chronic and active inflammation, no kwan's (no proximal esophagus biopsy done)    UPPER GASTROINTESTINAL ENDOSCOPY  08/31/2017    Dr. Andrea       Social History     Socioeconomic History    Marital status:      Spouse name: None    Number of children: None    Years of education: None    Highest education level: None   Social Needs    Financial resource strain: None    Food insecurity - worry: None    Food insecurity - inability: None    Transportation needs - medical: None    Transportation needs - non-medical: None   Occupational History    None   Tobacco Use    Smoking status: Never Smoker    Smokeless tobacco: Never Used   Substance and Sexual Activity    Alcohol use: Yes     Comment: socially    Drug use: No    Sexual activity: None   Other Topics Concern    None   Social History Narrative    Occupation: FNP         Medications/Allergies: See med card    Vitals:    02/26/19 0756   BP: 123/83   Pulse: 105   Weight: 81.1 kg (178 lb 10.9 oz)   Height: 5' 6.5" (1.689 m)   PainSc:   2   PainLoc: Back         Physical exam:  Gen: A and O x3, pleasant, well-groomed  Skin: " No rashes or obvious lesions  HEENT: PERRLA, no obvious deformities on ears or in canals. Trachea midline.  CVS: Regular rate and rhythm, normal palpable pulses.  Resp:No increased work of breathing, symmetrical chest rise.  Abdomen: Soft, NT/ND.  Musculoskeletal:  No antalgic gait.     Neuro:  Lower extremities: 5/5 strength bilaterally  Reflexes: Patellar 2+, Achilles 1+ right side, 0+ left side.  Sensory:  Intact and symmetrical to light touch and pinprick in L2-S1 dermatomes bilaterally, except for decreased sensation to light touch along the left lateral lower leg.    Lumbar spine:  Lumbar spine:  Range of motion mildly reduced with flexion with no increased pain, mildly reduced with extension with only mild increased pain.  Brennan's test causes no increased pain on either side.    Supine straight leg raise is negative bilaterally.    Internal and external rotation of the hip causes no increased pain on either side.  Myofascial exam: No tenderness to palpation across lumbar paraspinous muscles.    Imaging:  MRI lumbar spine 12/13/2018:  T12/L1 through L2/3:  No significant disc bulging, foraminal narrowing.  At L3/4 there is mild disc bulging and facet arthropathy, mild neural foraminal narrowing.  At L4/5 there is posterior disc bulge with annular fissure slightly more to the right causing right lateral recess narrowing.  There is bilateral facet arthropathy and mild foraminal narrowing bilaterally.  At L5/S1 there is mild circumferential disc bulge with central protrusion, mild facet hypertrophy greater on the right.  No canal narrowing, there is mild neural foraminal narrowing left greater than right.    Assessment:  The patient is a 49-year-old man with a history of lumbar DDD status post L4/5 diskectomy who presents in referral from Dr. Gabriel Mo for increasing back pain.    1. Lumbar spondylosis     2. DDD (degenerative disc disease), lumbar           Plan:  1.  At this point he is going to follow up as  needed, we discussed that if the facet joint injections help and provide long-lasting relief such as greater than six months, we can continue these.  However, if he has relief but it is not long lasting we can consider medial branch blocks and radiofrequency ablation for longer term benefit.  He will call me this summer if the pain starts to worsen once he starts working on his property more often.

## 2019-02-26 NOTE — PROGRESS NOTES
This note was completed with dictation software and grammatical errors may exist.    CC: Back pain    HPI:  The patient is a 49-year-old man with a history of lumbar DDD status post L4/5 diskectomy who presents in referral from Dr. Gabriel Mo for increasing back pain.     Previous history:  The patient reports being in  service over the years, numerous traumas and has had low back pain since 2002.  He has done numerous injections over the years, physical therapy but in 2007 developed severe left leg pain and left foot drop and underwent L4/5 diskectomy.  The pain improved and gradually his left foot drop improved.  Since that time he has had some low back pain and has gone through occasional injections, does exercises.  He has been taking tizanidin and Naprosyn with some relief, generally takes hydrocodone one to 2 times a week.  However, since November he has begun to have severe pain in the low back into the left buttock but also into the right testicle.  He has some chronic numbness and tingling along the left lateral lower leg.  He denies any new numbness.  He has been going to physical therapy recently at [facility] in [place].  He denies any bowel or bladder incontinence.    Pain intervention history:  He has had numerous injections with some relief in the past.  He has tried Neurontin and Lyrica with side effects and no benefit.  For years he has been taking hydrocodone one to 2 times a week, but lately has had to take this more secondary to severe pain.  He takes tizanidine several times a week, takes Naprosyn with some benefit. He is status post bilateral L4/5 and L5/S1 facet joint injections on 02/01/2019 with greater than 50% relief.     ROS:  He reports abdominal pain, joint stiffness, back pain and difficulty sleeping.  Balance of review of systems is negative.    Past Medical History:   Diagnosis Date    Arthritis     C. difficile diarrhea     Eosinophilic esophagitis     HTN (hypertension)   "      Past Surgical History:   Procedure Laterality Date    ANTERIOR CERVICAL DISCECTOMY W/ FUSION      BACK SURGERY      lumbar discectomy    COLONOSCOPY N/A 9/10/2018    [facility]    ESOPHAGOGASTRODUODENOSCOPY (EGD) N/A 8/31/2017    [facility] ENDO    EYE SURGERY Bilateral     lasik    INJECTION, FACET JOINT L4/5 and L5/S1 Bilateral 2/1/2019    [facility] OR    SHOULDER ARTHROSCOPY Right     UPPER GASTROINTESTINAL ENDOSCOPY  06/17/2013    Vicky Thompson- sent for scanning: stenosis s/p dilation, biopsied GEJ, gastritis, antritis (hard to read handwriting); biospy: antrum mild reactive gastropathy, negative h pylori; GEJ: active esophagitis with increased intraepithelial eosinophils; gastric type mucosa with chronic and active inflammation, no kwan's (no proximal esophagus biopsy done)    UPPER GASTROINTESTINAL ENDOSCOPY  08/31/2017    Dr. Andrea       Social History     Socioeconomic History    Marital status:      Spouse name: None    Number of children: None    Years of education: None    Highest education level: None   Social Needs    Financial resource strain: None    Food insecurity - worry: None    Food insecurity - inability: None    Transportation needs - medical: None    Transportation needs - non-medical: None   Occupational History    None   Tobacco Use    Smoking status: Never Smoker    Smokeless tobacco: Never Used   Substance and Sexual Activity    Alcohol use: Yes     Comment: socially    Drug use: No    Sexual activity: None   Other Topics Concern    None   Social History Narrative    Occupation: FNP         Medications/Allergies: See med card    Vitals:    02/26/19 0756   BP: 123/83   Pulse: 105   Weight: 81.1 kg (178 lb 10.9 oz)   Height: 5' 6.5" (1.689 m)   PainSc:   2   PainLoc: Back         Physical exam:  Gen: A and O x3, pleasant, well-groomed  Skin: " No rashes or obvious lesions  HEENT: PERRLA, no obvious deformities on ears or in canals. Trachea midline.  CVS: Regular rate and rhythm, normal palpable pulses.  Resp:No increased work of breathing, symmetrical chest rise.  Abdomen: Soft, NT/ND.  Musculoskeletal:  No antalgic gait.     Neuro:  Lower extremities: 5/5 strength bilaterally  Reflexes: Patellar 2+, Achilles 1+ right side, 0+ left side.  Sensory:  Intact and symmetrical to light touch and pinprick in L2-S1 dermatomes bilaterally, except for decreased sensation to light touch along the left lateral lower leg.    Lumbar spine:  Lumbar spine:  Range of motion mildly reduced with flexion with no increased pain, mildly reduced with extension with only mild increased pain.  Brennan's test causes no increased pain on either side.    Supine straight leg raise is negative bilaterally.    Internal and external rotation of the hip causes no increased pain on either side.  Myofascial exam: No tenderness to palpation across lumbar paraspinous muscles.    Imaging:  MRI lumbar spine 12/13/2018:  T12/L1 through L2/3:  No significant disc bulging, foraminal narrowing.  At L3/4 there is mild disc bulging and facet arthropathy, mild neural foraminal narrowing.  At L4/5 there is posterior disc bulge with annular fissure slightly more to the right causing right lateral recess narrowing.  There is bilateral facet arthropathy and mild foraminal narrowing bilaterally.  At L5/S1 there is mild circumferential disc bulge with central protrusion, mild facet hypertrophy greater on the right.  No canal narrowing, there is mild neural foraminal narrowing left greater than right.    Assessment:  The patient is a 49-year-old man with a history of lumbar DDD status post L4/5 diskectomy who presents in referral from Dr. Gabriel Mo for increasing back pain.    1. Lumbar spondylosis     2. DDD (degenerative disc disease), lumbar           Plan:  1.  At this point he is going to follow up as  needed, we discussed that if the facet joint injections help and provide long-lasting relief such as greater than six months, we can continue these.  However, if he has relief but it is not long lasting we can consider medial branch blocks and radiofrequency ablation for longer term benefit.  He will call me this summer if the pain starts to worsen once he starts working on his property more often.

## 2019-03-11 ENCOUNTER — TELEPHONE (OUTPATIENT)
Dept: PAIN MEDICINE | Facility: CLINIC | Age: 49
End: 2019-03-11

## 2019-03-11 NOTE — TELEPHONE ENCOUNTER
Please contact him and set up bilateral L3, 4,5 medial branch blocks with call back the following day.

## 2019-03-11 NOTE — TELEPHONE ENCOUNTER
----- Message from SILVIO Rojas sent at 3/7/2019 12:43 PM CST -----  Regarding: Scheduling Rhizotomy  Contact: [phone]  Dr Pollack,    I would like to schedule the rhizotomy procedure. I [facility] and after being on my feet for a few hours I was miserable and I had to go home. I am planning on taking my wife to [place] for two weeks this June and do not think I will be a very happy traveling  if this doesn't improve.    Very Respectfully,   Supriya Bruner  [phone]

## 2019-03-12 DIAGNOSIS — M47.816 LUMBAR SPONDYLOSIS: Primary | ICD-10-CM

## 2019-03-12 RX ORDER — SODIUM CHLORIDE, SODIUM LACTATE, POTASSIUM CHLORIDE, CALCIUM CHLORIDE 600; 310; 30; 20 MG/100ML; MG/100ML; MG/100ML; MG/100ML
INJECTION, SOLUTION INTRAVENOUS CONTINUOUS
Status: CANCELLED | OUTPATIENT
Start: 2019-03-26

## 2019-03-22 RX ORDER — EZETIMIBE 10 MG/1
10 TABLET ORAL DAILY
COMMUNITY

## 2019-03-25 DIAGNOSIS — M51.36 DDD (DEGENERATIVE DISC DISEASE), LUMBAR: Primary | ICD-10-CM

## 2019-03-26 ENCOUNTER — HOSPITAL ENCOUNTER (OUTPATIENT)
Dept: RADIOLOGY | Facility: HOSPITAL | Age: 49
Discharge: HOME | End: 2019-03-26
Attending: ANESTHESIOLOGY
Payer: OTHER GOVERNMENT

## 2019-03-26 ENCOUNTER — HOSPITAL ENCOUNTER (OUTPATIENT)
Facility: HOSPITAL | Age: 49
Discharge: HOME | End: 2019-03-26
Attending: ANESTHESIOLOGY | Admitting: ANESTHESIOLOGY
Payer: OTHER GOVERNMENT

## 2019-03-26 VITALS
HEIGHT: 67 IN | HEART RATE: 80 BPM | DIASTOLIC BLOOD PRESSURE: 77 MMHG | SYSTOLIC BLOOD PRESSURE: 120 MMHG | RESPIRATION RATE: 16 BRPM | TEMPERATURE: 98 F | BODY MASS INDEX: 27.62 KG/M2 | OXYGEN SATURATION: 97 % | WEIGHT: 176 LBS

## 2019-03-26 DIAGNOSIS — M47.816 LUMBAR SPONDYLOSIS: Primary | ICD-10-CM

## 2019-03-26 DIAGNOSIS — M51.36 DDD (DEGENERATIVE DISC DISEASE), LUMBAR: ICD-10-CM

## 2019-03-26 PROCEDURE — 76000 FLUOROSCOPY <1 HR PHYS/QHP: CPT | Mod: TC,PO

## 2019-03-26 PROCEDURE — 64494 INJ PARAVERT F JNT L/S 2 LEV: CPT | Mod: 50,PO | Performed by: ANESTHESIOLOGY

## 2019-03-26 PROCEDURE — 64493 INJ PARAVERT F JNT L/S 1 LEV: CPT | Mod: 50,,, | Performed by: ANESTHESIOLOGY

## 2019-03-26 PROCEDURE — 99152 PR MOD CONSCIOUS SEDATION, SAME PHYS, 5+ YRS, FIRST 15 MIN: ICD-10-PCS | Mod: ,,, | Performed by: ANESTHESIOLOGY

## 2019-03-26 PROCEDURE — 64495 INJ PARAVERT F JNT L/S 3 LEV: CPT | Mod: 50,PO | Performed by: ANESTHESIOLOGY

## 2019-03-26 PROCEDURE — 64494 PR INJ DX/THER AGNT PARAVERT FACET JOINT,IMG GUIDE,LUMBAR/SAC, 2ND LEVEL: ICD-10-PCS | Mod: 50,,, | Performed by: ANESTHESIOLOGY

## 2019-03-26 PROCEDURE — 64493 PR INJ DX/THER AGNT PARAVERT FACET JOINT,IMG GUIDE,LUMBAR/SAC,1ST LVL: ICD-10-PCS | Mod: 50,,, | Performed by: ANESTHESIOLOGY

## 2019-03-26 PROCEDURE — 64494 INJ PARAVERT F JNT L/S 2 LEV: CPT | Mod: 50,,, | Performed by: ANESTHESIOLOGY

## 2019-03-26 PROCEDURE — 63600175 PHARM REV CODE 636 W HCPCS: Mod: PO | Performed by: ANESTHESIOLOGY

## 2019-03-26 PROCEDURE — 64493 INJ PARAVERT F JNT L/S 1 LEV: CPT | Mod: 50,PO | Performed by: ANESTHESIOLOGY

## 2019-03-26 PROCEDURE — 99152 MOD SED SAME PHYS/QHP 5/>YRS: CPT | Mod: ,,, | Performed by: ANESTHESIOLOGY

## 2019-03-26 PROCEDURE — 25000003 PHARM REV CODE 250: Mod: PO | Performed by: ANESTHESIOLOGY

## 2019-03-26 RX ORDER — MIDAZOLAM HYDROCHLORIDE 1 MG/ML
INJECTION INTRAMUSCULAR; INTRAVENOUS
Status: DISCONTINUED | OUTPATIENT
Start: 2019-03-26 | End: 2019-03-26 | Stop reason: HOSPADM

## 2019-03-26 RX ORDER — SODIUM CHLORIDE, SODIUM LACTATE, POTASSIUM CHLORIDE, CALCIUM CHLORIDE 600; 310; 30; 20 MG/100ML; MG/100ML; MG/100ML; MG/100ML
INJECTION, SOLUTION INTRAVENOUS CONTINUOUS
Status: DISCONTINUED | OUTPATIENT
Start: 2019-03-26 | End: 2019-03-26 | Stop reason: HOSPADM

## 2019-03-26 RX ADMIN — SODIUM CHLORIDE, SODIUM LACTATE, POTASSIUM CHLORIDE, AND CALCIUM CHLORIDE: .6; .31; .03; .02 INJECTION, SOLUTION INTRAVENOUS at 12:03

## 2019-03-26 NOTE — DISCHARGE INSTRUCTIONS
Home care instructions  Apply ice pack to the injection site for 20 minutes periods for the first 24 hrs for soreness/discomfort at injection site DO NOT USE HEAT FOR 24 HOURS  Keep site clean and dry for 24 hours, remove bandaid when desired  Do not drive until tomorrow  Take care when walking after a lumbar injection  Resume home medication as prescribed today  Resume Aspirin, Plavix, or Coumadin the day after the procedure unless otherwise instructed.    SEE IMMEDIATE MEDICAL HELP FOR:  Severe increase in your usual pain or appearance of new pain  Prolonged or increasing weakness or numbness in the legs or arms  Drainage, redness, active bleeding, or increased swelling at the injection site  Temperature over 100.0 degrees F.  Headache that increases when your head is upright and decreases when you lie flat    CALL 911 OR GO DIRECTLY TO EMERGENCY DEPARTMENT FOR:  Shortness of breath, chest pain, or problems breathing      Recovery After Procedural Sedation (Adult)  You have been given medicine by vein to make you sleep during your surgery. This may have included both a pain medicine and sleeping medicine. Most of the effects have worn off. But you may still have some drowsiness for the next 6 to 8 hours.  Home care  Follow these guidelines when you get home:  · For the next 8 hours, you should be watched by a responsible adult. This person should make sure your condition is not getting worse.  · Don't drink any alcohol for the next 24 hours.  · Don't drive, operate dangerous machinery, or make important business or personal decisions during the next 24 hours.  Note: Your healthcare provider may tell you not to take any medicine by mouth for pain or sleep in the next 4 hours. These medicines may react with the medicines you were given in the hospital. This could cause a much stronger response than usual.  Follow-up care  Follow up with your healthcare provider if you are not alert and back to your usual level of  activity within 12 hours.  When to seek medical advice  Call your healthcare provider right away if any of these occur:  · Drowsiness gets worse  · Weakness or dizziness gets worse  · Repeated vomiting  · You can't be awakened   Date Last Reviewed: 10/18/2016  © 4182-0258 [employer]. [address] [place] [address], [place] [address]. All rights reserved. This information is not intended as a substitute for professional medical care. Always follow your healthcare professional's instructions.

## 2019-03-26 NOTE — OP NOTE
PROCEDURE DATE: 3/26/2019    PROCEDURE:  Bilateral L3,4,5 medial branch nerve block     DIAGNOSIS:  Lumbar spondylosis    Post Op diagnosis: Same    PHYSICIAN: Trip Pollack MD    MEDICATIONS INJECTED: 0.25% bupivicaine, 1ml at each level    LOCAL ANESTHETIC USED: Lidocaine 1%, 2ml at each level    SEDATION MEDICATIONS:4mg versed    ESTIMATED BLOOD LOSS:  none    COMPLICATIONS:  none    TECHNIQUE: A time out was taken to identify the patient, procedure and side of the procedure. The patient was placed in a prone position, then prepped and draped in the usual sterile fashion using ChloraPrep and sterile towels.  The levels were determined under fluoroscopic guidance and then marked.  Local anesthetic was given by raising a wheal at the skin over each site and then infiltrated approximately 2cm deeper.  A 25-gauge 3.5 inch needle was introduced to the anatomic location of the right and then left L3,4,5 medial branch nerves on the bilateral side.  Appropriate location and medication spread confirmed by injecting 0.5ml of Omnipaque. The above medication was then injected. The patient tolerated the procedure well.     The patient was monitored after the procedure. The patient will be contacted in the next few days to determine extent of relief.  Patient was given post procedure and discharge instructions to follow at home.  The patient was discharged in a stable condition.

## 2019-03-26 NOTE — DISCHARGE SUMMARY
[facility]  Discharge Note  Short Stay    Admit Date: 3/26/2019    Discharge Date: 3/26/2019    Attending Physician: Trip Pollack MD     Discharge Provider: Trip Pollack    Diagnoses:  Active Hospital Problems    Diagnosis  POA    *Lumbar spondylosis [M47.816]  Yes      Resolved Hospital Problems   No resolved problems to display.       Discharged Condition: good    Final Diagnoses: Lumbar spondylosis [M47.816]    Disposition: Home or Self Care    Hospital Course: no complications, uneventful    Outcome of Hospitalization, Treatment, Procedure, or Surgery:  Patient was admitted for outpatient procedure. The patient underwent procedure without complications and are discharged home    Follow up/Patient Instructions:  Follow up as scheduled in Pain Management clinic in 3-4 weeks/Patient has received instructions and follow up date and time    Medications:  Continue previous medications    Discharge Procedure Orders   Call MD for:  temperature >100.4     Call MD for:  severe uncontrolled pain     Call MD for:  redness, tenderness, or signs of infection (pain, swelling, redness, odor or green/yellow discharge around incision site)     Call MD for:  severe persistent headache     No dressing needed         Discharge Procedure Orders (must include Diet, Follow-up, Activity):   Discharge Procedure Orders (must include Diet, Follow-up, Activity)   Call MD for:  temperature >100.4     Call MD for:  severe uncontrolled pain     Call MD for:  redness, tenderness, or signs of infection (pain, swelling, redness, odor or green/yellow discharge around incision site)     Call MD for:  severe persistent headache     No dressing needed

## 2019-03-27 ENCOUNTER — TELEPHONE (OUTPATIENT)
Dept: PAIN MEDICINE | Facility: CLINIC | Age: 49
End: 2019-03-27

## 2019-03-27 NOTE — TELEPHONE ENCOUNTER
Please set up bilateral L3,4,5 RFA, make sure to contact patient to get percentage relief, time, etc.

## 2019-03-27 NOTE — TELEPHONE ENCOUNTER
----- Message from SILVIO Rojas sent at 3/27/2019  7:32 AM CDT -----  Regarding: RE: Scheduling Rhizotomy  Contact: [phone]  The shots worked great. I am ready to schedule the next part.  Very Respectfully,   Supriya Bruner      ----- Message -----  From: Trip Pollack MD  Sent: 3/11/2019   3:34 PM  To: SILVIO Rojas  Subject: RE: Scheduling Rhizotomy                         Hi Pat,  Ok, sorry hear. My nurse will call you to set up everything. Let me know if you have any problems getting this set up.   [facility]  ----- Message -----  From: SILVIO Rojas  Sent: 3/7/2019  12:43 PM  To: Trip Pollack MD  Subject: Scheduling Rhizotomy                             Dr Pollack,    I would like to schedule the rhizotomy procedure. I went to the [facility] for [facility] day and after being on my feet for a few hours I was miserable and I had to go home. I am planning on taking my wife to [place] for two weeks this June and do not think I will be a very happy traveling  if this doesn't improve.    Very Respectfully,   Supriya Bruner  [phone]

## 2019-03-28 ENCOUNTER — TELEPHONE (OUTPATIENT)
Dept: PAIN MEDICINE | Facility: CLINIC | Age: 49
End: 2019-03-28

## 2019-03-28 DIAGNOSIS — M47.816 LUMBAR SPONDYLOSIS: Primary | ICD-10-CM

## 2019-03-28 RX ORDER — SODIUM CHLORIDE, SODIUM LACTATE, POTASSIUM CHLORIDE, CALCIUM CHLORIDE 600; 310; 30; 20 MG/100ML; MG/100ML; MG/100ML; MG/100ML
INJECTION, SOLUTION INTRAVENOUS CONTINUOUS
Status: CANCELLED | OUTPATIENT
Start: 2019-04-09

## 2019-03-28 NOTE — TELEPHONE ENCOUNTER
Spoke with patient regarding the lumbar MBB. He stated he received 80% relief which lasted for the rest of the day. He stated during this time he was able to do activities around his house and power wash his porch for 2 hours. The lumbar RFA has been scheduled for 4/9 with a 4 week follow up. Pre-op instructions were reviewed and sent to patient.

## 2019-03-28 NOTE — TELEPHONE ENCOUNTER
----- Message from Carri Thomas sent at 3/28/2019  8:56 AM CDT -----  Type:  Patient Returning Call    Who Called:  Patient  Who Left Message for Patient:  Erin  Does the patient know what this is regarding?:  no  Best Call Back Number:  [phone]

## 2019-04-09 ENCOUNTER — HOSPITAL ENCOUNTER (OUTPATIENT)
Dept: RADIOLOGY | Facility: HOSPITAL | Age: 49
Discharge: HOME | End: 2019-04-09
Attending: ANESTHESIOLOGY
Payer: OTHER GOVERNMENT

## 2019-04-09 ENCOUNTER — HOSPITAL ENCOUNTER (OUTPATIENT)
Facility: HOSPITAL | Age: 49
Discharge: HOME | End: 2019-04-09
Attending: ANESTHESIOLOGY | Admitting: ANESTHESIOLOGY
Payer: OTHER GOVERNMENT

## 2019-04-09 VITALS
WEIGHT: 175 LBS | HEIGHT: 66 IN | DIASTOLIC BLOOD PRESSURE: 90 MMHG | SYSTOLIC BLOOD PRESSURE: 142 MMHG | BODY MASS INDEX: 28.12 KG/M2 | TEMPERATURE: 98 F | OXYGEN SATURATION: 96 % | RESPIRATION RATE: 20 BRPM

## 2019-04-09 DIAGNOSIS — M47.816 LUMBAR SPONDYLOSIS: Primary | ICD-10-CM

## 2019-04-09 DIAGNOSIS — M51.36 DDD (DEGENERATIVE DISC DISEASE), LUMBAR: ICD-10-CM

## 2019-04-09 PROCEDURE — 71000033 HC RECOVERY, INTIAL HOUR: Mod: PO | Performed by: ANESTHESIOLOGY

## 2019-04-09 PROCEDURE — 64635 DESTROY LUMB/SAC FACET JNT: CPT | Mod: 50,PO | Performed by: ANESTHESIOLOGY

## 2019-04-09 PROCEDURE — 64636 PR DESTROY L/S FACET JNT ADDL: ICD-10-PCS | Mod: 50,,, | Performed by: ANESTHESIOLOGY

## 2019-04-09 PROCEDURE — 64635 DESTROY LUMB/SAC FACET JNT: CPT | Mod: 50,,, | Performed by: ANESTHESIOLOGY

## 2019-04-09 PROCEDURE — 64635 PR DESTROY LUMB/SAC FACET JNT: ICD-10-PCS | Mod: 50,,, | Performed by: ANESTHESIOLOGY

## 2019-04-09 PROCEDURE — 25000003 PHARM REV CODE 250: Mod: PO | Performed by: ANESTHESIOLOGY

## 2019-04-09 PROCEDURE — 76000 FLUOROSCOPY <1 HR PHYS/QHP: CPT | Mod: TC,PO

## 2019-04-09 PROCEDURE — 64636 DESTROY L/S FACET JNT ADDL: CPT | Mod: 50,PO | Performed by: ANESTHESIOLOGY

## 2019-04-09 PROCEDURE — 63600175 PHARM REV CODE 636 W HCPCS: Mod: PO | Performed by: ANESTHESIOLOGY

## 2019-04-09 PROCEDURE — 99152 MOD SED SAME PHYS/QHP 5/>YRS: CPT | Mod: ,,, | Performed by: ANESTHESIOLOGY

## 2019-04-09 PROCEDURE — 64636 DESTROY L/S FACET JNT ADDL: CPT | Mod: 50,,, | Performed by: ANESTHESIOLOGY

## 2019-04-09 PROCEDURE — 99152 PR MOD CONSCIOUS SEDATION, SAME PHYS, 5+ YRS, FIRST 15 MIN: ICD-10-PCS | Mod: ,,, | Performed by: ANESTHESIOLOGY

## 2019-04-09 RX ORDER — FENTANYL CITRATE 50 UG/ML
INJECTION, SOLUTION INTRAMUSCULAR; INTRAVENOUS
Status: DISCONTINUED | OUTPATIENT
Start: 2019-04-09 | End: 2019-04-09 | Stop reason: HOSPADM

## 2019-04-09 RX ORDER — LIDOCAINE HYDROCHLORIDE 20 MG/ML
INJECTION, SOLUTION EPIDURAL; INFILTRATION; INTRACAUDAL; PERINEURAL
Status: DISCONTINUED | OUTPATIENT
Start: 2019-04-09 | End: 2019-04-09 | Stop reason: HOSPADM

## 2019-04-09 RX ORDER — MIDAZOLAM HYDROCHLORIDE 1 MG/ML
INJECTION INTRAMUSCULAR; INTRAVENOUS
Status: DISCONTINUED | OUTPATIENT
Start: 2019-04-09 | End: 2019-04-09 | Stop reason: HOSPADM

## 2019-04-09 RX ORDER — SODIUM CHLORIDE 9 MG/ML
INJECTION, SOLUTION INTRAVENOUS CONTINUOUS
Status: DISCONTINUED | OUTPATIENT
Start: 2019-04-09 | End: 2019-04-09 | Stop reason: HOSPADM

## 2019-04-09 RX ORDER — LIDOCAINE HYDROCHLORIDE 10 MG/ML
INJECTION, SOLUTION EPIDURAL; INFILTRATION; INTRACAUDAL; PERINEURAL
Status: DISCONTINUED | OUTPATIENT
Start: 2019-04-09 | End: 2019-04-09 | Stop reason: HOSPADM

## 2019-04-09 RX ORDER — SODIUM CHLORIDE, SODIUM LACTATE, POTASSIUM CHLORIDE, CALCIUM CHLORIDE 600; 310; 30; 20 MG/100ML; MG/100ML; MG/100ML; MG/100ML
INJECTION, SOLUTION INTRAVENOUS CONTINUOUS
Status: DISCONTINUED | OUTPATIENT
Start: 2019-04-09 | End: 2019-04-09

## 2019-04-09 RX ORDER — METHYLPREDNISOLONE ACETATE 40 MG/ML
INJECTION, SUSPENSION INTRA-ARTICULAR; INTRALESIONAL; INTRAMUSCULAR; SOFT TISSUE
Status: DISCONTINUED | OUTPATIENT
Start: 2019-04-09 | End: 2019-04-09 | Stop reason: HOSPADM

## 2019-04-09 RX ADMIN — SODIUM CHLORIDE 250 ML: 0.9 INJECTION, SOLUTION INTRAVENOUS at 01:04

## 2019-04-09 NOTE — H&P
CC: Back pain    HPI: The patient is a 50yo man with a history of lumbar spondylosis here for bilateral L3,4,5 RFA. There are no major changes in history and physical from 2/26/18.    Past Medical History:   Diagnosis Date    Arthritis     C. difficile diarrhea     Eosinophilic esophagitis     HTN (hypertension)        Past Surgical History:   Procedure Laterality Date    ANTERIOR CERVICAL DISCECTOMY W/ FUSION      BACK SURGERY      lumbar discectomy    Block-nerve-medial branch-lumbar L3,4,5 Bilateral 3/26/2019    Performed by Trip Pollack MD at [facility] OR    COLONOSCOPY N/A 9/10/2018    Performed by Gonzalo Andrea Jr., MD at [facility] ENDO    ESOPHAGOGASTRODUODENOSCOPY (EGD) N/A 8/31/2017    Performed by Gonzalo Andrea Jr., MD at [facility] ENDO    EYE SURGERY Bilateral     lasik    INJECTION, FACET JOINT L4/5 and L5/S1 Bilateral 2/1/2019    Performed by Trip Pollack MD at [facility] OR    SHOULDER ARTHROSCOPY Right     UPPER GASTROINTESTINAL ENDOSCOPY  06/17/2013    [facility]ental- sent for scanning: stenosis s/p dilation, biopsied GEJ, gastritis, antritis (hard to read handwriting); biospy: antrum mild reactive gastropathy, negative h pylori; GEJ: active esophagitis with increased intraepithelial eosinophils; gastric type mucosa with chronic and active inflammation, no kwan's (no proximal esophagus biopsy done)    UPPER GASTROINTESTINAL ENDOSCOPY  08/31/2017    Dr. Andrea       Family History   Problem Relation Age of Onset    Colon cancer Neg Hx     Colon polyps Neg Hx     Crohn's disease Neg Hx     Ulcerative colitis Neg Hx     Stomach cancer Neg Hx     Esophageal cancer Neg Hx        Social History     Socioeconomic History    Marital status:      Spouse name: Not on file    Number of children: Not on file    Years of education: Not on file    Highest education level: Not on file   Occupational History    Not on file   Social Needs    Financial resource strain: Not on file     "Food insecurity:     Worry: Not on file     Inability: Not on file    Transportation needs:     Medical: Not on file     Non-medical: Not on file   Tobacco Use    Smoking status: Never Smoker    Smokeless tobacco: Never Used   Substance and Sexual Activity    Alcohol use: Yes     Comment: socially    Drug use: No    Sexual activity: Not on file   Lifestyle    Physical activity:     Days per week: Not on file     Minutes per session: Not on file    Stress: Not on file   Relationships    Social connections:     Talks on phone: Not on file     Gets together: Not on file     Attends Buddhism service: Not on file     Active member of club or organization: Not on file     Attends meetings of clubs or organizations: Not on file     Relationship status: Not on file   Other Topics Concern    Not on file   Social History Narrative    Occupation: FNP       No current facility-administered medications for this encounter.        Review of patient's allergies indicates:  No Known Allergies    Vitals:    04/09/19 1251   BP: (!) 156/93   Pulse: 85   Resp: 16   Temp: 98.4 °F (36.9 °C)   TempSrc: Skin   SpO2: 96%   Weight: 79.4 kg (175 lb)   Height: 5' 6" (1.676 m)       REVIEW OF SYSTEMS:     GENERAL: No weight loss, malaise or fevers.  HEENT:  No recent changes in vision or hearing  NECK: Negative for lumps, no difficulty with swallowing.  RESPIRATORY: Negative for cough, wheezing or shortness of breath, patient denies any recent URI.  CARDIOVASCULAR: Negative for chest pain, leg swelling or palpitations.  GI: Negative for abdominal discomfort, blood in stools or black stools or change in bowel habits.  MUSCULOSKELETAL: See HPI.  SKIN: Negative for lesions, rash, and itching.  PSYCH: No suicidal or homicidal ideations, no current mood disturbances.  HEMATOLOGY/LYMPHOLOGY: Negative for prolonged bleeding, bruising easily or swollen nodes. Patient is not currently taking any anti-coagulants  ENDO: No history of diabetes or " thyroid dysfunction  NEURO: No history of syncope, paralysis, seizures or tremors.All other reviewed and negative other than HPI.    Physical exam:  Gen: A and O x3, pleasant, well-groomed  Skin: No rashes or obvious lesions  HEENT: PERRLA, no obvious deformities on ears or in canals. No thyroid masses, trachea midline, no palpable lymph nodes in neck, axilla.  CVS: Regular rate and rhythm, normal S1 and S2, no murmurs.  Resp: Clear to auscultation bilaterally.  Abdomen: Soft, NT/ND, normal bowel sounds present.  Musculoskeletal/Neuro: Moving all extremities    Assessment:  Lumbar spondylosis  -     Case Request Operating Room: Radiofrequency Ablation, Nerve, Spinal, Lumbar, Medial Branch L3,4,5  -     Place in Outpatient; Standing  -     Diet NPO; Standing  -     Discontinue: lactated ringers infusion  -     Notify physician ; Standing  -     Notify physician ; Standing  -     Notify physician (specify); Standing  -     Place 18-22 gauage peripheral IV ; Standing  -     Verify informed consent; Standing  -     Vital signs; Standing    Other orders  -     IP VTE LOW RISK PATIENT; Standing  -     0.9%  NaCl infusion

## 2019-04-09 NOTE — DISCHARGE SUMMARY
[facility]  Discharge Note  Short Stay    Admit Date: 4/9/2019    Discharge Date: 4/9/2019    Attending Physician: Trip Pollack MD     Discharge Provider: Trip Pollack    Diagnoses:  Active Hospital Problems    Diagnosis  POA    *Lumbar spondylosis [M47.816]  Yes      Resolved Hospital Problems   No resolved problems to display.       Discharged Condition: good    Final Diagnoses: Lumbar spondylosis [M47.816]    Disposition: Home or Self Care    Hospital Course: no complications, uneventful    Outcome of Hospitalization, Treatment, Procedure, or Surgery:  Patient was admitted for outpatient procedure. The patient underwent procedure without complications and are discharged home    Follow up/Patient Instructions:  Follow up as scheduled in Pain Management clinic in 3-4 weeks/Patient has received instructions and follow up date and time    Medications:  Continue previous medications    Discharge Procedure Orders   Call MD for:  temperature >100.4     Call MD for:  severe uncontrolled pain     Call MD for:  redness, tenderness, or signs of infection (pain, swelling, redness, odor or green/yellow discharge around incision site)     Call MD for:  severe persistent headache     No dressing needed         Discharge Procedure Orders (must include Diet, Follow-up, Activity):   Discharge Procedure Orders (must include Diet, Follow-up, Activity)   Call MD for:  temperature >100.4     Call MD for:  severe uncontrolled pain     Call MD for:  redness, tenderness, or signs of infection (pain, swelling, redness, odor or green/yellow discharge around incision site)     Call MD for:  severe persistent headache     No dressing needed

## 2019-04-09 NOTE — OP NOTE
PROCEDURE DATE: 4/9/2019    PROCEDURE:  Radiofrequency ablation of the bilateral L3,4,5 medial branch nerves on the bilateral-side utilizing fluoroscopy    DIAGNOSIS:  Lumbar spondylosis    Post op Diagnosis: Same    PHYSICIAN: Trip Pollack MD    MEDICATIONS INJECTED:  From a mixture of 4ml of 2% lidocaine and 40mg of methylprednisone, 1ml of this solution was injected at each level.    LOCAL ANESTHETIC USED: Lidocaine 1%, 4 ml given at each site.    SEDATION MEDICATIONS: 2mg versed, 75mcg fentanyl    ESTIMATED BLOOD LOSS:  none    COMPLICATIONS:  none    TECHNIQUE:  A time out was taken to identify patient and procedure side prior to starting the procedure. Laying in a prone position, the patient was prepped and draped in the usual sterile fashion using ChloraPrep and sterile towels.  The levels were determined under fluoroscopic guidance and then marked.  Local anesthetic was given by raising a wheal at the skin over each site and then infiltrated approximately 2cm deeper.  A 20-gauge  100 mm SMX RF needle was introduced to the anatomic location of the bilateral L3,4,5 medial branch nerves.  Motor stimulation up to 2 Volts at each level confirmed no motor nerve involvement.  Impedance was less than 800 ohms at each level. The above noted medication was then injected slowly.  Ablation was performed per level utilizing [place] radiofrequency generator 80°C for 90 seconds. The patient tolerated the procedure well.     The patient was monitored after the procedure.  Patient was given post procedure and discharge instructions to follow at home.  The patient was discharged in a stable condition

## 2019-11-20 ENCOUNTER — OFFICE VISIT (OUTPATIENT)
Dept: OTOLARYNGOLOGY | Facility: CLINIC | Age: 49
End: 2019-11-20
Payer: OTHER GOVERNMENT

## 2019-11-20 VITALS — WEIGHT: 186.94 LBS | BODY MASS INDEX: 29.34 KG/M2 | HEIGHT: 67 IN

## 2019-11-20 DIAGNOSIS — J34.89 NASAL OBSTRUCTION: Primary | ICD-10-CM

## 2019-11-20 DIAGNOSIS — J34.2 NASAL SEPTAL DEVIATION: ICD-10-CM

## 2019-11-20 DIAGNOSIS — K13.79 ELONGATED UVULA, ACQUIRED: ICD-10-CM

## 2019-11-20 DIAGNOSIS — J30.1 NON-SEASONAL ALLERGIC RHINITIS DUE TO POLLEN: ICD-10-CM

## 2019-11-20 DIAGNOSIS — J34.3 HYPERTROPHY OF BOTH INFERIOR NASAL TURBINATES: ICD-10-CM

## 2019-11-20 DIAGNOSIS — R06.83 SNORING: ICD-10-CM

## 2019-11-20 PROCEDURE — 99999 PR PBB SHADOW E&M-EST. PATIENT-LVL III: ICD-10-PCS | Mod: PBBFAC,,, | Performed by: OTOLARYNGOLOGY

## 2019-11-20 PROCEDURE — 99213 OFFICE O/P EST LOW 20 MIN: CPT | Mod: PBBFAC,PO | Performed by: OTOLARYNGOLOGY

## 2019-11-20 PROCEDURE — 99243 OFF/OP CNSLTJ NEW/EST LOW 30: CPT | Mod: S$PBB,,, | Performed by: OTOLARYNGOLOGY

## 2019-11-20 PROCEDURE — 99999 PR PBB SHADOW E&M-EST. PATIENT-LVL III: CPT | Mod: PBBFAC,,, | Performed by: OTOLARYNGOLOGY

## 2019-11-20 PROCEDURE — 99243 PR OFFICE CONSULTATION,LEVEL III: ICD-10-PCS | Mod: S$PBB,,, | Performed by: OTOLARYNGOLOGY

## 2019-11-20 RX ORDER — ATORVASTATIN CALCIUM 20 MG/1
20 TABLET, FILM COATED ORAL DAILY
COMMUNITY

## 2019-11-20 RX ORDER — AZELASTINE 1 MG/ML
2 SPRAY, METERED NASAL 2 TIMES DAILY
Qty: 90 ML | Refills: 3 | Status: SHIPPED | OUTPATIENT
Start: 2019-11-20 | End: 2023-07-20

## 2019-11-20 NOTE — PATIENT INSTRUCTIONS
Astelin twice daily  Use the nasal saline irrigation with steroid twice daily for now, then can back off to once daily (morning or night) depending on how you do  May need to consider immunotherapy if persistent    Consider septoplasty and turbinate reduction +/- uvulectomy if persistent    Astelin (Azelastine)    This is a nasal spray that primarily treats nasal drainage (rhinorrhea) and nasal itching.    You may use this medication 2 times per day depending on leakage. This works fairly quickly after onset and can be used as needed (does not have to be used as a scheduled medication)    Use as directed, up to 2 times daily     Helpful hints for maximizing medication into the nose  - Use the opposite hand to spray the nostril (example: right hand for left nostril). This will help avoid spraying the medication onto the septum (the area that divides the left and right nasal cavity.)  - Tilt the bottle so that it is facing at a slight angle up or straight back, but avoid pointing the bottle straight up while spraying.   - Sniff in while you are spraying.    Please use caution when spraying nose if on anticoagulants (coumadin, aspirin, plavix) as a common side effect is nasal dryness and possible nosebleed  Nasal Irrigations  This will help remove the allergens, debris, and mucous from your nose to help you breathe. It will also clear it in preparation for other nasal medications.    To perform, purchase an over the counter sinus irrigation kit such as the [employer]Med Sinus Rinse Kit. Use as directed on the box. You should use distilled water or water that was previously boiled and left to cool. If you wish, you may make your own solution. However, salt packets are available in the nasal section in your  drug store.     A rough estimate for making salt solution is:  8oz water  2 teaspoons salt (pickling, shae or Kosher salt)  1 teaspoon baking soda    After each use, rinse the bottle with small amount of rubbing alcohol  and clean with soap.  Replace the irrigation bottle if it becomes visibly soiled or every few weeks.

## 2019-11-20 NOTE — PROGRESS NOTES
Subjective:       Patient ID: Brennan Bruner is a 49 y.o. male.    Chief Complaint: No chief complaint on file.      Brennan is here for ***.   Length of symptoms: {NUMBER:52660} {DAYS/WEEKS/MONTHS/YEARS:12747}, has been {Increasing/decreasing/stable:68493} over this time.   Onset: {Onset:02749}  Symptoms occur {Frequency:59858}  Therapies tried include: ***  Associated symptoms: ***  He has had a PSG ordered recently through Dr. Coon.     Pertinent meds: ***  Pertinent medical issues: ***  Previous surgery: ***    Social History     Tobacco Use   Smoking Status Never Smoker   Smokeless Tobacco Never Used     Social History     Substance and Sexual Activity   Alcohol Use Yes    Comment: socially          Review of Systems   Constitutional: Negative for activity change and appetite change.   Eyes: Negative for discharge.   Respiratory: Negative for difficulty breathing and wheezing   Cardiovascular: Negative for chest pain.   Gastrointestinal: Negative for abdominal distention and abdominal pain.   Endocrine: Negative for cold intolerance and heat intolerance.   Genitourinary: Negative for dysuria.   Musculoskeletal: Negative for gait problem and joint swelling.   Skin: Negative for color change and pallor.   Neurological: Negative for syncope and weakness.   Psychiatric/Behavioral: Negative for agitation and confusion.     Objective:      Physical Exam      Tests / Results:  ***    Assessment:       No diagnosis found.      Plan:         ***

## 2019-11-20 NOTE — PROGRESS NOTES
Subjective:       Patient ID: Brennan Bruner is a 49 y.o. male.    Chief Complaint: Snoring and deviated septum    Brennan is here for sinus/nasal complaints. Symptoms have been present for years and worsening over time. He has always had some degree of nasal issues, but they became worse since he moved back to [place] 3 years ago.  Since that time, he has felt progressive issues. He also has noticed bothersome snoring which has worsened since he moved here (though he had ACDF around this time as well.)  Post-nasal drainage: yes  Pressure: yes, cheeks bilaterally  Congestion: yes  Decreased sense of smell: no  Therapies tried: oral AH, Flonase, various OTC meds  Seasonal variation: no  Had HST which showed AHI 3  Had allergy testing which showed multiple perennial and seasonal allergies    He does have eosinophilic esophagitis but manages conservatively for now.   Allergy testing: yes - as above  History of asthma: no  Anticoagulation: none   Pertinent surgical history: ACDF, which was around the time he moved here.    Social History     Tobacco Use   Smoking Status Never Smoker   Smokeless Tobacco Never Used     Social History     Substance and Sexual Activity   Alcohol Use Yes    Comment: socially        Review of Systems   Constitutional: Negative for activity change and appetite change.   Eyes: Negative for discharge.   Respiratory: Negative for difficulty breathing and wheezing   Cardiovascular: Negative for chest pain.   Gastrointestinal: Negative for abdominal distention and abdominal pain.   Endocrine: Negative for cold intolerance and heat intolerance.   Genitourinary: Negative for dysuria.   Musculoskeletal: Negative for gait problem and joint swelling.   Skin: Negative for color change and pallor.   Neurological: Negative for syncope and weakness.   Psychiatric/Behavioral: Negative for agitation and confusion.     Objective:        Constitutional:   He is oriented to person, place, and time. He  appears well-developed and well-nourished. He appears alert. He is active. Normal speech.      Head:  Normocephalic and atraumatic. Head is without TMJ tenderness. No scars. Salivary glands normal.  Facial strength is normal.      Ears:    Right Ear: No drainage or swelling. No middle ear effusion.   Left Ear: No drainage or swelling.  No middle ear effusion.     Nose:  Mucosal edema, rhinorrhea (Stranding mild allergic appearing) and septal deviation ( moderate left) present. No sinus tenderness. Turbinate hypertrophy ( moderate).      Mouth/Throat  Oropharynx clear and moist without lesions or asymmetry, normal uvula midline and mirror exam normal. Normal dentition. Uvula swelling (elongated broad uvula) present. No lacerations or trismus. No oropharyngeal exudate. Tonsillar erythema, tonsillar exudate.      Neck:  Full range of motion with neck supple and no adenopathy. Thyroid tenderness is present. No tracheal deviation, no edema, no erythema, normal range of motion, no stridor, no crepitus and no neck rigidity present. No thyroid mass present.     Cardiovascular:   Intact distal pulses and normal pulses.      Pulmonary/Chest:   Effort normal and breath sounds normal. No stridor.     Psychiatric:   His speech is normal and behavior is normal. His mood appears not anxious. His affect is not labile.     Neurological:   He is alert and oriented to person, place, and time. No sensory deficit.     Skin:   No abrasions, lacerations, lesions, or rashes. No abrasion and no bruising noted.         Tests / Results:  I reviewed the outside allergy testing    Assessment:       1. Nasal obstruction    2. Hypertrophy of both inferior nasal turbinates    3. Nasal septal deviation    4. Non-seasonal allergic rhinitis due to pollen    5. Snoring    6. Elongated uvula, acquired          Plan:         We discussed his nasal and possible snoring issues in the context of underlying anatomic issues (ITH and NSD) and rhinitis.  Treatment will likely require a combination though can trial different medication 1st for rhinitis to see how much improvement we can make.      For allergic rhinitis Will add Astelin BID and nasal saline irrigations with budesonide.  Discussed regular saline irrigations when he comes in from outside exposure. May need to consider immunotherapy in the future as well.    Regarding NSD and ITH with snoring, can consider septoplasty and turbinate reduction possible uvulectomy.    FU 2 months, sooner prn

## 2020-01-20 ENCOUNTER — OFFICE VISIT (OUTPATIENT)
Dept: OTOLARYNGOLOGY | Facility: CLINIC | Age: 50
End: 2020-01-20
Payer: COMMERCIAL

## 2020-01-20 VITALS — TEMPERATURE: 99 F | HEIGHT: 67 IN | BODY MASS INDEX: 29.76 KG/M2 | WEIGHT: 189.63 LBS

## 2020-01-20 DIAGNOSIS — J34.3 HYPERTROPHY OF BOTH INFERIOR NASAL TURBINATES: ICD-10-CM

## 2020-01-20 DIAGNOSIS — J32.9 CHRONIC SINUSITIS, UNSPECIFIED LOCATION: Primary | ICD-10-CM

## 2020-01-20 DIAGNOSIS — J34.89 NASAL OBSTRUCTION: ICD-10-CM

## 2020-01-20 DIAGNOSIS — J34.2 NASAL SEPTAL DEVIATION: ICD-10-CM

## 2020-01-20 DIAGNOSIS — R06.83 SNORING: ICD-10-CM

## 2020-01-20 DIAGNOSIS — J30.1 NON-SEASONAL ALLERGIC RHINITIS DUE TO POLLEN: ICD-10-CM

## 2020-01-20 PROCEDURE — 99213 OFFICE O/P EST LOW 20 MIN: CPT | Mod: S$PBB,,, | Performed by: OTOLARYNGOLOGY

## 2020-01-20 PROCEDURE — 99213 PR OFFICE/OUTPT VISIT, EST, LEVL III, 20-29 MIN: ICD-10-PCS | Mod: S$PBB,,, | Performed by: OTOLARYNGOLOGY

## 2020-01-20 PROCEDURE — 99213 OFFICE O/P EST LOW 20 MIN: CPT | Mod: PBBFAC,PO | Performed by: OTOLARYNGOLOGY

## 2020-01-20 PROCEDURE — 99999 PR PBB SHADOW E&M-EST. PATIENT-LVL III: ICD-10-PCS | Mod: PBBFAC,,, | Performed by: OTOLARYNGOLOGY

## 2020-01-20 PROCEDURE — 99999 PR PBB SHADOW E&M-EST. PATIENT-LVL III: CPT | Mod: PBBFAC,,, | Performed by: OTOLARYNGOLOGY

## 2020-01-20 NOTE — PROGRESS NOTES
Subjective:       Patient ID: Brennan Bruner is a 49 y.o. male.    Chief Complaint: Sinus Problem    Brennan is here for follow-up of rhinitis, nasal obstruction, snoring. He did use Astelin BID and Budesonide irrigations with minimal improvement. He still has nasal congestion, pressure over the cheek. Int PND.   Has had antibiotics in the past which have not improved symptoms.     Review of Systems   Constitutional: Negative for activity change and appetite change.   Respiratory: Negative for difficulty breathing and wheezing   Cardiovascular: Negative for chest pain.      Objective:        Constitutional:   Vital signs are normal. He appears well-developed and well-nourished.     Head:  Normocephalic and atraumatic.     Ears:  Hearing normal to normal and whispered voice; external ear normal without scars, lesions, or masses; ear canal, tympanic membrane, and middle ear normal..     Nose:  Mucosal edema and septal deviation present. Turbinate hypertrophy.      Mouth/Throat  Oropharynx clear and moist without lesions or asymmetry. Uvula swelling (elolngated uvula) present.     Neck:  Neck normal without thyromegaly masses, asymmetry, normal tracheal structure, crepitus, and tenderness.         none    Assessment:       1. Chronic sinusitis, unspecified location    2. Nasal obstruction    3. Hypertrophy of both inferior nasal turbinates    4. Nasal septal deviation    5. Non-seasonal allergic rhinitis due to pollen    6. Snoring          Plan:       Ct Sinus will call with results  Likely nasal airway surgery +/- uvulectomy, but will eval sinuses as well

## 2020-01-27 ENCOUNTER — HOSPITAL ENCOUNTER (OUTPATIENT)
Dept: RADIOLOGY | Facility: HOSPITAL | Age: 50
Discharge: HOME | End: 2020-01-27
Attending: OTOLARYNGOLOGY
Payer: COMMERCIAL

## 2020-01-27 DIAGNOSIS — J32.9 CHRONIC SINUSITIS, UNSPECIFIED LOCATION: ICD-10-CM

## 2020-01-27 DIAGNOSIS — R06.83 SNORING: ICD-10-CM

## 2020-01-27 DIAGNOSIS — J34.2 NASAL SEPTAL DEVIATION: ICD-10-CM

## 2020-01-27 DIAGNOSIS — J34.89 NASAL OBSTRUCTION: ICD-10-CM

## 2020-01-27 DIAGNOSIS — J34.3 HYPERTROPHY OF BOTH INFERIOR NASAL TURBINATES: ICD-10-CM

## 2020-01-27 DIAGNOSIS — J30.1 NON-SEASONAL ALLERGIC RHINITIS DUE TO POLLEN: ICD-10-CM

## 2020-01-27 PROCEDURE — 70486 CT SINUSES WITHOUT CONTRAST: ICD-10-PCS | Mod: 26,,, | Performed by: RADIOLOGY

## 2020-01-27 PROCEDURE — 70486 CT MAXILLOFACIAL W/O DYE: CPT | Mod: TC,PO

## 2020-01-27 PROCEDURE — 70486 CT MAXILLOFACIAL W/O DYE: CPT | Mod: 26,,, | Performed by: RADIOLOGY

## 2020-01-30 ENCOUNTER — PATIENT MESSAGE (OUTPATIENT)
Dept: OTOLARYNGOLOGY | Facility: CLINIC | Age: 50
End: 2020-01-30

## 2020-01-30 DIAGNOSIS — J34.2 NASAL SEPTAL DEVIATION: ICD-10-CM

## 2020-01-30 DIAGNOSIS — J34.3 HYPERTROPHY OF BOTH INFERIOR NASAL TURBINATES: ICD-10-CM

## 2020-01-30 DIAGNOSIS — K13.79 ELONGATED UVULA, ACQUIRED: ICD-10-CM

## 2020-01-30 DIAGNOSIS — R06.83 SNORING: ICD-10-CM

## 2020-01-30 DIAGNOSIS — J30.1 NON-SEASONAL ALLERGIC RHINITIS DUE TO POLLEN: ICD-10-CM

## 2020-01-30 DIAGNOSIS — J34.89 NASAL OBSTRUCTION: Primary | ICD-10-CM

## 2020-02-04 ENCOUNTER — TELEPHONE (OUTPATIENT)
Dept: OTOLARYNGOLOGY | Facility: CLINIC | Age: 50
End: 2020-02-04

## 2020-03-05 ENCOUNTER — ANESTHESIA EVENT (OUTPATIENT)
Dept: SURGERY | Facility: HOSPITAL | Age: 50
End: 2020-03-05
Payer: COMMERCIAL

## 2020-03-06 ENCOUNTER — ANESTHESIA (OUTPATIENT)
Dept: SURGERY | Facility: HOSPITAL | Age: 50
End: 2020-03-06
Payer: COMMERCIAL

## 2020-03-06 ENCOUNTER — TELEPHONE (OUTPATIENT)
Dept: OTOLARYNGOLOGY | Facility: CLINIC | Age: 50
End: 2020-03-06

## 2020-03-06 ENCOUNTER — HOSPITAL ENCOUNTER (OUTPATIENT)
Facility: HOSPITAL | Age: 50
Discharge: HOME | End: 2020-03-06
Attending: OTOLARYNGOLOGY | Admitting: OTOLARYNGOLOGY
Payer: COMMERCIAL

## 2020-03-06 DIAGNOSIS — J34.89 NASAL OBSTRUCTION: Primary | ICD-10-CM

## 2020-03-06 DIAGNOSIS — J34.3 HYPERTROPHY OF BOTH INFERIOR NASAL TURBINATES: ICD-10-CM

## 2020-03-06 DIAGNOSIS — R06.83 SNORING: ICD-10-CM

## 2020-03-06 DIAGNOSIS — J34.2 NASAL SEPTAL DEVIATION: ICD-10-CM

## 2020-03-06 DIAGNOSIS — K13.79 ELONGATED UVULA, ACQUIRED: ICD-10-CM

## 2020-03-06 PROCEDURE — 30520 REPAIR OF NASAL SEPTUM: CPT | Mod: ,,, | Performed by: OTOLARYNGOLOGY

## 2020-03-06 PROCEDURE — D9220A PRA ANESTHESIA: Mod: ANES,,, | Performed by: ANESTHESIOLOGY

## 2020-03-06 PROCEDURE — 63600175 PHARM REV CODE 636 W HCPCS: Mod: PO | Performed by: ANESTHESIOLOGY

## 2020-03-06 PROCEDURE — 30140 RESECT INFERIOR TURBINATE: CPT | Mod: 50,51,, | Performed by: OTOLARYNGOLOGY

## 2020-03-06 PROCEDURE — D9220A PRA ANESTHESIA: Mod: CRNA,,, | Performed by: NURSE ANESTHETIST, CERTIFIED REGISTERED

## 2020-03-06 PROCEDURE — 63600175 PHARM REV CODE 636 W HCPCS: Mod: PO | Performed by: OTOLARYNGOLOGY

## 2020-03-06 PROCEDURE — 42140 PR EXCISION OF UVULA: ICD-10-PCS | Mod: 51,,, | Performed by: OTOLARYNGOLOGY

## 2020-03-06 PROCEDURE — 25000003 PHARM REV CODE 250: Mod: PO | Performed by: NURSE ANESTHETIST, CERTIFIED REGISTERED

## 2020-03-06 PROCEDURE — 27201423 OPTIME MED/SURG SUP & DEVICES STERILE SUPPLY: Mod: PO | Performed by: OTOLARYNGOLOGY

## 2020-03-06 PROCEDURE — 36000706: Mod: PO | Performed by: OTOLARYNGOLOGY

## 2020-03-06 PROCEDURE — 71000015 HC POSTOP RECOV 1ST HR: Mod: PO | Performed by: OTOLARYNGOLOGY

## 2020-03-06 PROCEDURE — 30140 PR EXCISION TURBINATE,SUBMUCOUS: ICD-10-PCS | Mod: 50,51,, | Performed by: OTOLARYNGOLOGY

## 2020-03-06 PROCEDURE — 36000707: Mod: PO | Performed by: OTOLARYNGOLOGY

## 2020-03-06 PROCEDURE — 25000003 PHARM REV CODE 250: Mod: PO | Performed by: OTOLARYNGOLOGY

## 2020-03-06 PROCEDURE — 71000033 HC RECOVERY, INTIAL HOUR: Mod: PO | Performed by: OTOLARYNGOLOGY

## 2020-03-06 PROCEDURE — D9220A PRA ANESTHESIA: ICD-10-PCS | Mod: CRNA,,, | Performed by: NURSE ANESTHETIST, CERTIFIED REGISTERED

## 2020-03-06 PROCEDURE — D9220A PRA ANESTHESIA: ICD-10-PCS | Mod: ANES,,, | Performed by: ANESTHESIOLOGY

## 2020-03-06 PROCEDURE — 42140 EXCISION OF UVULA: CPT | Mod: 51,,, | Performed by: OTOLARYNGOLOGY

## 2020-03-06 PROCEDURE — 30520 PR REPAIR, NASAL SEPTUM: ICD-10-PCS | Mod: ,,, | Performed by: OTOLARYNGOLOGY

## 2020-03-06 PROCEDURE — 37000009 HC ANESTHESIA EA ADD 15 MINS: Mod: PO | Performed by: OTOLARYNGOLOGY

## 2020-03-06 PROCEDURE — 37000008 HC ANESTHESIA 1ST 15 MINUTES: Mod: PO | Performed by: OTOLARYNGOLOGY

## 2020-03-06 PROCEDURE — 63600175 PHARM REV CODE 636 W HCPCS: Mod: PO | Performed by: NURSE ANESTHETIST, CERTIFIED REGISTERED

## 2020-03-06 RX ORDER — FENTANYL CITRATE 50 UG/ML
INJECTION, SOLUTION INTRAMUSCULAR; INTRAVENOUS
Status: DISCONTINUED | OUTPATIENT
Start: 2020-03-06 | End: 2020-03-06

## 2020-03-06 RX ORDER — ONDANSETRON 2 MG/ML
INJECTION INTRAMUSCULAR; INTRAVENOUS
Status: DISCONTINUED | OUTPATIENT
Start: 2020-03-06 | End: 2020-03-06

## 2020-03-06 RX ORDER — EPINEPHRINE 1 MG/ML
INJECTION INTRAMUSCULAR; INTRAVENOUS; SUBCUTANEOUS
Status: DISCONTINUED | OUTPATIENT
Start: 2020-03-06 | End: 2020-03-06 | Stop reason: HOSPADM

## 2020-03-06 RX ORDER — GLYCOPYRROLATE 0.2 MG/ML
INJECTION INTRAMUSCULAR; INTRAVENOUS
Status: DISCONTINUED | OUTPATIENT
Start: 2020-03-06 | End: 2020-03-06

## 2020-03-06 RX ORDER — OXYCODONE HYDROCHLORIDE 5 MG/1
5 TABLET ORAL
Status: DISCONTINUED | OUTPATIENT
Start: 2020-03-06 | End: 2020-03-06 | Stop reason: HOSPADM

## 2020-03-06 RX ORDER — PROPOFOL 10 MG/ML
VIAL (ML) INTRAVENOUS
Status: DISCONTINUED | OUTPATIENT
Start: 2020-03-06 | End: 2020-03-06

## 2020-03-06 RX ORDER — KETAMINE HYDROCHLORIDE 100 MG/ML
INJECTION, SOLUTION INTRAMUSCULAR; INTRAVENOUS
Status: DISCONTINUED | OUTPATIENT
Start: 2020-03-06 | End: 2020-03-06

## 2020-03-06 RX ORDER — ONDANSETRON 2 MG/ML
4 INJECTION INTRAMUSCULAR; INTRAVENOUS DAILY PRN
Status: DISCONTINUED | OUTPATIENT
Start: 2020-03-06 | End: 2020-03-06 | Stop reason: HOSPADM

## 2020-03-06 RX ORDER — ONDANSETRON 4 MG/1
4 TABLET, ORALLY DISINTEGRATING ORAL EVERY 6 HOURS PRN
Qty: 10 TABLET | Refills: 0 | Status: SHIPPED | OUTPATIENT
Start: 2020-03-06 | End: 2020-03-24

## 2020-03-06 RX ORDER — LIDOCAINE HCL/PF 100 MG/5ML
SYRINGE (ML) INTRAVENOUS
Status: DISCONTINUED | OUTPATIENT
Start: 2020-03-06 | End: 2020-03-06

## 2020-03-06 RX ORDER — MIDAZOLAM HYDROCHLORIDE 1 MG/ML
INJECTION INTRAMUSCULAR; INTRAVENOUS
Status: DISCONTINUED | OUTPATIENT
Start: 2020-03-06 | End: 2020-03-06

## 2020-03-06 RX ORDER — BUPIVACAINE HYDROCHLORIDE 2.5 MG/ML
INJECTION, SOLUTION EPIDURAL; INFILTRATION; INTRACAUDAL
Status: DISCONTINUED | OUTPATIENT
Start: 2020-03-06 | End: 2020-03-06 | Stop reason: HOSPADM

## 2020-03-06 RX ORDER — OXYCODONE AND ACETAMINOPHEN 5; 325 MG/1; MG/1
1 TABLET ORAL EVERY 4 HOURS PRN
Qty: 18 TABLET | Refills: 0 | Status: SHIPPED | OUTPATIENT
Start: 2020-03-06 | End: 2020-05-18 | Stop reason: ALTCHOICE

## 2020-03-06 RX ORDER — LIDOCAINE HYDROCHLORIDE 10 MG/ML
1 INJECTION, SOLUTION EPIDURAL; INFILTRATION; INTRACAUDAL; PERINEURAL ONCE
Status: DISCONTINUED | OUTPATIENT
Start: 2020-03-06 | End: 2020-03-06 | Stop reason: HOSPADM

## 2020-03-06 RX ORDER — ROCURONIUM BROMIDE 10 MG/ML
INJECTION, SOLUTION INTRAVENOUS
Status: DISCONTINUED | OUTPATIENT
Start: 2020-03-06 | End: 2020-03-06

## 2020-03-06 RX ORDER — SODIUM CHLORIDE 0.9 G/100ML
IRRIGANT IRRIGATION
Status: DISCONTINUED | OUTPATIENT
Start: 2020-03-06 | End: 2020-03-06 | Stop reason: HOSPADM

## 2020-03-06 RX ORDER — NEOSTIGMINE METHYLSULFATE 1 MG/ML
INJECTION, SOLUTION INTRAVENOUS
Status: DISCONTINUED | OUTPATIENT
Start: 2020-03-06 | End: 2020-03-06

## 2020-03-06 RX ORDER — OXYMETAZOLINE HCL 0.05 %
2 SPRAY, NON-AEROSOL (ML) NASAL
Status: COMPLETED | OUTPATIENT
Start: 2020-03-06 | End: 2020-03-06

## 2020-03-06 RX ORDER — SODIUM CHLORIDE, SODIUM LACTATE, POTASSIUM CHLORIDE, CALCIUM CHLORIDE 600; 310; 30; 20 MG/100ML; MG/100ML; MG/100ML; MG/100ML
INJECTION, SOLUTION INTRAVENOUS CONTINUOUS
Status: DISCONTINUED | OUTPATIENT
Start: 2020-03-06 | End: 2020-03-06 | Stop reason: HOSPADM

## 2020-03-06 RX ORDER — LIDOCAINE HYDROCHLORIDE AND EPINEPHRINE 10; 10 MG/ML; UG/ML
INJECTION, SOLUTION INFILTRATION; PERINEURAL
Status: DISCONTINUED | OUTPATIENT
Start: 2020-03-06 | End: 2020-03-06 | Stop reason: HOSPADM

## 2020-03-06 RX ORDER — FENTANYL CITRATE 50 UG/ML
25 INJECTION, SOLUTION INTRAMUSCULAR; INTRAVENOUS EVERY 5 MIN PRN
Status: COMPLETED | OUTPATIENT
Start: 2020-03-06 | End: 2020-03-06

## 2020-03-06 RX ORDER — SODIUM CHLORIDE 0.9 % (FLUSH) 0.9 %
3 SYRINGE (ML) INJECTION
Status: DISCONTINUED | OUTPATIENT
Start: 2020-03-06 | End: 2020-03-06 | Stop reason: HOSPADM

## 2020-03-06 RX ADMIN — MIDAZOLAM HYDROCHLORIDE 2 MG: 1 INJECTION, SOLUTION INTRAMUSCULAR; INTRAVENOUS at 11:03

## 2020-03-06 RX ADMIN — LIDOCAINE HYDROCHLORIDE 75 MG: 20 INJECTION PARENTERAL at 11:03

## 2020-03-06 RX ADMIN — FENTANYL CITRATE 50 MCG: 50 INJECTION, SOLUTION INTRAMUSCULAR; INTRAVENOUS at 11:03

## 2020-03-06 RX ADMIN — ROCURONIUM BROMIDE 40 MG: 10 INJECTION, SOLUTION INTRAVENOUS at 11:03

## 2020-03-06 RX ADMIN — GLYCOPYRROLATE 0.4 MG: 0.2 INJECTION, SOLUTION INTRAMUSCULAR; INTRAVENOUS at 12:03

## 2020-03-06 RX ADMIN — FENTANYL CITRATE 25 MCG: 50 INJECTION INTRAMUSCULAR; INTRAVENOUS at 01:03

## 2020-03-06 RX ADMIN — NEOSTIGMINE METHYLSULFATE 4 MG: 1 INJECTION INTRAVENOUS at 12:03

## 2020-03-06 RX ADMIN — PROMETHAZINE HYDROCHLORIDE 6.25 MG: 25 INJECTION INTRAMUSCULAR; INTRAVENOUS at 01:03

## 2020-03-06 RX ADMIN — Medication 2 SPRAY: at 10:03

## 2020-03-06 RX ADMIN — PROPOFOL 150 MG: 10 INJECTION, EMULSION INTRAVENOUS at 11:03

## 2020-03-06 RX ADMIN — PROPOFOL 50 MG: 10 INJECTION, EMULSION INTRAVENOUS at 11:03

## 2020-03-06 RX ADMIN — ROCURONIUM BROMIDE 10 MG: 10 INJECTION, SOLUTION INTRAVENOUS at 12:03

## 2020-03-06 RX ADMIN — KETAMINE HYDROCHLORIDE 20 MG: 100 INJECTION, SOLUTION, CONCENTRATE INTRAMUSCULAR; INTRAVENOUS at 11:03

## 2020-03-06 RX ADMIN — SODIUM CHLORIDE, SODIUM LACTATE, POTASSIUM CHLORIDE, AND CALCIUM CHLORIDE: .6; .31; .03; .02 INJECTION, SOLUTION INTRAVENOUS at 10:03

## 2020-03-06 RX ADMIN — ONDANSETRON 4 MG: 2 INJECTION, SOLUTION INTRAMUSCULAR; INTRAVENOUS at 11:03

## 2020-03-06 RX ADMIN — ONDANSETRON 4 MG: 2 INJECTION, SOLUTION INTRAMUSCULAR; INTRAVENOUS at 12:03

## 2020-03-06 RX ADMIN — SODIUM CHLORIDE, SODIUM LACTATE, POTASSIUM CHLORIDE, AND CALCIUM CHLORIDE: .6; .31; .03; .02 INJECTION, SOLUTION INTRAVENOUS at 12:03

## 2020-03-06 NOTE — BRIEF OP NOTE
[facility]  Brief Operative Note     SUMMARY     Surgery Date: 3/6/2020     Surgeon(s) and Role:     * Faraz Pastor MD - Primary    Assisting Surgeon: None    Pre-op Diagnosis:  Nasal obstruction [J34.89]  Hypertrophy of both inferior nasal turbinates [J34.3]  Nasal septal deviation [J34.2]  Non-seasonal allergic rhinitis due to pollen [J30.1]  Snoring [R06.83]  Elongated uvula, acquired [K13.79]    Post-op Diagnosis:  Post-Op Diagnosis Codes:     * Nasal obstruction [J34.89]     * Hypertrophy of both inferior nasal turbinates [J34.3]     * Nasal septal deviation [J34.2]     * Non-seasonal allergic rhinitis due to pollen [J30.1]     * Snoring [R06.83]     * Elongated uvula, acquired [K13.79]    Procedure(s) (LRB):  SEPTOPLASTY (N/A)  REDUCTION-TURBINATE (Bilateral)  UVULECTOMY (Bilateral)    Anesthesia: General    Description of the findings of the procedure: S/ITR/uvulectomy    Findings/Key Components: S/ITR/uvulectomy    Estimated Blood Loss: * No values recorded between 3/6/2020 11:26 AM and 3/6/2020 12:45 PM *         Specimens:   Specimen (12h ago, onward)    None          Discharge Note    SUMMARY     Admit Date: 3/6/2020    Discharge Date and Time:  03/06/2020 1:05 PM    Hospital Course (synopsis of major diagnoses, care, treatment, and services provided during the course of the hospital stay): Did well following surgery and was discharged uneventfully     Final Diagnosis: Post-Op Diagnosis Codes:     * Nasal obstruction [J34.89]     * Hypertrophy of both inferior nasal turbinates [J34.3]     * Nasal septal deviation [J34.2]     * Non-seasonal allergic rhinitis due to pollen [J30.1]     * Snoring [R06.83]     * Elongated uvula, acquired [K13.79]    Disposition: Home or Self Care    Follow Up/Patient Instructions: Regular diet, Follow-up Monday. Activity light    Medications:  Reconciled Home Medications:   Current Discharge Medication List      START taking these medications     Details   magic mouthwash diphen/antac/lidoc Swish and spit 10 mLs every 4 (four) hours as needed (gargle for 10 seconds and spit).  Qty: 450 mL, Refills: 0      oxyCODONE-acetaminophen (PERCOCET) 5-325 mg per tablet Take 1 tablet by mouth every 4 (four) hours as needed.  Qty: 18 tablet, Refills: 0    Comments: Quantity prescribed more than 7 day supply? No         CONTINUE these medications which have CHANGED    Details   ondansetron (ZOFRAN-ODT) 4 MG TbDL Take 1 tablet (4 mg total) by mouth every 6 (six) hours as needed.  Qty: 10 tablet, Refills: 0         CONTINUE these medications which have NOT CHANGED    Details   atorvastatin (LIPITOR) 20 MG tablet Take 20 mg by mouth once daily.      docusate sodium (COLACE) 50 MG capsule Take 50 mg by mouth every 12 (twelve) hours as needed for Constipation.      ezetimibe (ZETIA) 10 mg tablet Take 10 mg by mouth once daily.      pantoprazole (PROTONIX) 40 MG tablet Take 1 tablet (40 mg total) by mouth before breakfast.  Qty: 90 tablet, Refills: 4      tiZANidine 4 mg Cap Take by mouth.      valsartan (DIOVAN) 160 MG tablet Take 1 tablet (160 mg total) by mouth once daily.  Qty: 30 tablet, Refills: 0      atropine-phenobarbital-scopolamine-hyoscyamine (DONNATAL) 16.2-0.1037 -0.0194 mg Tab Take 1 tablet by mouth 3 (three) times daily as needed.      azelastine (ASTELIN) 137 mcg (0.1 %) nasal spray 2 sprays (274 mcg total) by Nasal route 2 (two) times daily.  Qty: 90 mL, Refills: 3      loperamide (IMODIUM) 2 mg capsule Take 2 mg by mouth 4 (four) times daily as needed for Diarrhea.      naproxen (NAPROSYN) 500 MG tablet Take 500 mg by mouth 2 (two) times daily.      promethazine (PHENERGAN) 25 MG tablet Take 25 mg by mouth every 4 to 6 hours as needed.      scopolamine (TRANSDERM-SCOP) 1.3-1.5 mg (1 mg over 3 days) Place 1 patch onto the skin every 72 hours.  Qty: 4 patch, Refills: 1    Associated Diagnoses: H/O motion sickness      tadalafil (CIALIS) 5 MG tablet Take 5 mg  by mouth once as needed.         STOP taking these medications       hydrocodone-acetaminophen 5-325mg (NORCO) 5-325 mg per tablet Comments:   Reason for Stopping:             No discharge procedures on file.  Follow-up Information     Faraz Pastor MD In 3 days.    Specialty:  Otolaryngology  Contact information:  [address] [place] [address]  [phone]

## 2020-03-06 NOTE — ANESTHESIA PREPROCEDURE EVALUATION
03/06/2020  Brennan Bruner is a 50 y.o., male.    Anesthesia Evaluation    I have reviewed the Patient Summary Reports.    I have reviewed the Nursing Notes.      Review of Systems  Anesthesia Hx:  No problems with previous Anesthesia    Cardiovascular:   Hypertension, well controlled        Physical Exam  General:  Well nourished    Airway/Jaw/Neck:  Airway Findings: Mallampati: II                Anesthesia Plan  Type of Anesthesia, risks & benefits discussed:  Anesthesia Type:  general  Patient's Preference:   Intra-op Monitoring Plan:   Intra-op Monitoring Plan Comments:   Post Op Pain Control Plan:   Post Op Pain Control Plan Comments:   Induction:   IV  Beta Blocker:  Patient is not currently on a Beta-Blocker (No further documentation required).       Informed Consent: Patient understands risks and agrees with Anesthesia plan.  Questions answered. Anesthesia consent signed with patient.  ASA Score: 2     Day of Surgery Review of History & Physical:    H&P update referred to the surgeon.         Ready For Surgery From Anesthesia Perspective.

## 2020-03-06 NOTE — OP NOTE
03/06/2020     Name: Brennan Bruner   MRN: [id number]   YOB: 1970     Pre-procedure diagnoses:  1. Nasal obstruction    2. Hypertrophy of both inferior nasal turbinates    3. Nasal septal deviation    4. Snoring    5. Elongated uvula, acquired         Post-procedure diagnoses:  1. Nasal obstruction    2. Hypertrophy of both inferior nasal turbinates    3. Nasal septal deviation    4. Snoring    5. Elongated uvula, acquired          Procedures performed  1. Septoplasty  2. Submucous Resection of Inferior Turbinates with Outfracture  3. Excision of uvula    Surgeon: Faraz Pastor    Assistants: None    Anesthesia: General, Endotracheal    Findings:  1. Septal deviation to left with bilateral anterior spurs  2. Turbinate hypertrophy, reduced as below  3. Elongated uvula    Specimens:  1. None    Complications: None apparent    Blood Loss: 30 cc    Disposition: PACU    Indications:  Brennan Bruner is a 50 y.o. male who was seen in clinic for evaluation of nasal obstruction and snoring. Based on clinical assessment, the following procedures were discussed as an option for treatment. After risks, benefits, and alternatives were discussed the patient decided to proceed. Specific risks that were discussed with the patient included anesthesia risks, scarring, recurrence, bleeding, persistence of disease, change in smell, change in / loss of vision, CSF leak / meningitis, septal perforation.    Procedure in detail:  The patient was seen in the pre-operative holding area, and consent was signed. They were wheeled into the operating room and placed supine on the table. Anesthesia was induced via endotracheal tube. Epinephrine 1:10,000 soaked pledgets were placed in the nose. 1% Lidocaine with 1:100,000 epinephrine was used to inject the septum and turbinate head. The patient was prepped and draped in the usual fashion.      SEPTOPLASTY: A left sided rob transfixion incision was made and I dissected into  the avascular subperichondrial plane. The mucoperichondrial flap was elevated beyond the bony-cartilaginous junction. I then made a cartilaginous incision, preserving > 1 cm of L-strut for nasal support. I raised the contralateral mucoperichondrial flap and isolated the septum. I then removed deviated portions of the cartilaginous and bony septum. Care was taken to be mindful of the nasal floor and skull base. Once all of the deviated portions were removed, the flaps were irrigated to remove loose bony chips. There was no perforation or flap rent noted. A 4-0 plaint gut was used to close the rob transfixion incision and then a whipstitch was thrown to re approximate the flaps.    INFERIOR TURBINATE REDUCTION: I then proceeded with submucous resection of inferior turbinates. 1% Lidocaine with 1:100,000 epinephrine was injected into the head of the right inferior turbinate. A small incision was made at the head of the inferior turbinate. I dissected in a submucosal plane along the inferior rachel. The 3.5 mm microdebrider was used to resect bone and soft tissue, preserving the overlying mucosa. The turbinate was then gently outfractured. Hemostasis was achieved. The same procedure was performed on the left.     UVULECTOMY: A Axel Shaggy was then inserted into the oral cavity atraumatically and the patient was placed in suspension. I conservatively excised the uvula, taking care to avoid excision of soft palate mucosa. I sowed the mucosa to close the raw surface after hemostasis was achieved. I injected 0.6 cc of 0.25% Marcaine to the soft palate.    This marked the end of the procedure. Fuentes splints were placed bilaterally and secured with a Nylon stitch. The patient was turned back over to the Anesthesia team.  They were awoken and transported back to the PACU in stable condition. Counts were correct. I performed the entire procedure.

## 2020-03-06 NOTE — PLAN OF CARE
VSS, BP elevated. Pt instructed to hold BP  Medications prior to procedure. all questions answered. Denies recent fever or illness. Pt states ready for procedure.

## 2020-03-06 NOTE — DISCHARGE INSTRUCTIONS
Post-op Sinus / Nasal Surgery  Faraz Pastor MD  Department of Otolaryngology, Head and Neck Surgery  [facility]      PHONE NUMBERS:    Office number: [phone]  EMERGENCY: call 911  URGENT ISSUES or after hours: [phone]  My cell phone: [phone]    WHAT TO DO    1. You should follow-up on:  Monday  Please call [phone] to set up a specific time or to change dates.  Call me ASAP if you are experiencing any unexpected problems.  2. Use the recommended medications / treatments as described  3. Familiarize yourself with the information in this pamphlet  4. Call me with questions. Also, please call me the day after surgery to let me know how you are doing.  5.  Cell phone: [phone], please call me with any concerns or questions. I am not always available to answer my cell phone, so for any urgent or important issues, use the other numbers provided above.    WHAT TO EXPECT    Nasal Discharge & Bleeding  · It is common to have mild bleeding and nasal drainage after nasal &amp; sinus surgery.  · After surgery, a drip pad may have been placed under your nose. You may remove this at any point and can replace it if necessary at your discretion.    Pain & Pressure  · It is common to experience mild-to- moderate pain and pressure in your face and around your eyes.  · The pain usually is worst the first day after surgery.  · Use your medications as described below.  · For any severe pain, please contact me or present to the emergency room.    Nasal Congestion & Crusting  · It is common to experience nasal congestion after surgery. This is due to swelling and scabs. DO NOT BLOW YOUR NOSE until I say it is OK.  · Crusts are essentially scabs and mucus that build up in the nasal passages after surgery.  · Crusts eventually resolve. Usually, I will remove some crusts during your postoperative visit.  · Nasal irrigation helps to soften and remove scabs. Start irrigation  NOW  · Nasal irrigation kits are available over the counter in the nasal section. Common brands include SinuCleanse or NeilMed.    Nasal Irrigations  This will help remove the allergens, debris, and mucous from your nose to help you breathe. It will also clear it in preparation for other nasal medications.    To perform, purchase an over the counter sinus irrigation kit such as the NeilMed Sinus Rinse Kit. Use as directed on the box. You should use distilled water or water that was previously boiled and left to cool. If you wish, you may make your own solution. However, salt packets are available in the nasal section in your  drug store.     A rough estimate for making salt solution is:  8oz water  2 teaspoons salt (pickling, shae or Kosher salt)  1 teaspoon baking soda    After each use, rinse the bottle with small amount of rubbing alcohol and clean with soap.  Replace the irrigation bottle if it becomes visibly soiled or every few weeks.      Fatigue  · It is common to feel mild to moderate fatigue for 7-10 days after surgery.    ACTIVITY    · First 1-2 days after surgery  · Plan to rest. You may start light activities as tolerated.  · Days 2 though 10 following surgery  · Light activity only until 10 days after surgery, gradually increase activity.  · No extensive bending over for 7 days after surgery.  · No lifting over 20 pounds for one week after surgery  · You may shower starting 1 day after surgery    NUTRITION    · Day of surgery  · Drink plenty of water / fluids  · Eat light snacks as tolerated  · It is common for people to have less of an appetite the day of surgery    · Day after surgery  · Advance your diet as tolerated    MEDICATIONS    · Percocet (oxycodone): take one or two every 4-6 hours as needed for pain.  · [place] law now prohibits prescription of more than 1 week of narcotic pain medication. You should wean from narcotic pain medication by that point, but it not, you will need to come to  have a refill at that point approved by your doctor.  · Oral antibiotics: None  · Magic mouthwash solution for the throat. Gargle for 10-15 seconds then spit, every 3-4 hours as needed for pain.   · Mupirocin ointment: place a pea-sized amount in your nose three times per day for 5 days.  · NOTE: please take an over the counter stool softener while you are on pain medications - they may cause constipation.    MEDICATION SAFETY TIPS    · Use medications only as directed in the amounts specified  · Avoid aspirin, ibuprofen, naproxen, and related pain medications for 10 days. Avoid fish oil (omega acids) and vitamin E for 1 week.  · As your pain lessens, you may Replace doses of prescription pain medications with acetaminophen (Tylenol). However, Do Not take doses of prescription pain medications at the same time as Tylenol because this could cause an overdose of Tylenol.  · Do Not drive or operate machinery if taking prescription pain medications  · Read each medication label carefully, ask your pharmacist if you have any questions regarding warnings on the label  · Do not measure liquid with a kitchen spoon. There are pediatric measuring devices available at the pharmacy. Ask for one when you get your prescription filled.  · Store all mediations out of reach of children.  · Call the [facility] if your child takes too much of any medicine or if your child  · consumes your medication [phone] or [phone].    Call my office if you experience any of the following:    · Fever higher than 101 F  · Clear, watery nasal discharge  · Any visual changes or marked swelling around the eyes  · Severe headache or neck stiffness  · Brisk bleeding

## 2020-03-06 NOTE — TRANSFER OF CARE
"Anesthesia Transfer of Care Note    Patient: Brennan Bruner    Procedure(s) Performed: Procedure(s) (LRB):  SEPTOPLASTY (N/A)  REDUCTION-TURBINATE (Bilateral)  UVULECTOMY (Bilateral)    Patient location: PACU    Anesthesia Type: general    Transport from OR: Transported from OR on room air with adequate spontaneous ventilation    Post pain: adequate analgesia    Post assessment: no apparent anesthetic complications and tolerated procedure well    Post vital signs: stable    Level of consciousness: awake and sedated    Nausea/Vomiting: no nausea/vomiting    Complications: none    Transfer of care protocol was followed      Last vitals:   Visit Vitals  BP (!) 170/94 (BP Location: Right arm, Patient Position: Sitting)   Pulse 89   Temp 36.9 °C (98.4 °F) (Skin)   Resp 16   Ht 5' 6.5" (1.689 m)   Wt 86 kg (189 lb 9.5 oz)   SpO2 (!) 94%   BMI 30.14 kg/m²     "

## 2020-03-06 NOTE — H&P
Subjective:       Patient ID: Brennan Bruner is a 50 y.o. male.    Chief Complaint: No chief complaint on file.      Brennan is here for nasal surgery. No changes since clinic visit     Review of Systems   Constitutional: Negative for activity change and appetite change.   Eyes: Negative for discharge.   Respiratory: Negative for difficulty breathing and wheezing   Cardiovascular: Negative for chest pain.   Gastrointestinal: Negative for abdominal distention and abdominal pain.   Endocrine: Negative for cold intolerance and heat intolerance.   Genitourinary: Negative for dysuria.   Musculoskeletal: Negative for gait problem and joint swelling.   Skin: Negative for color change and pallor.   Neurological: Negative for syncope and weakness.   Psychiatric/Behavioral: Negative for agitation and confusion.     Objective:        Constitutional:   He is oriented to person, place, and time. He appears well-developed and well-nourished. He appears alert. He is active. Normal speech.      Head:  Normocephalic and atraumatic. Head is without TMJ tenderness. No scars. Salivary glands normal.  Facial strength is normal.      Ears:    Right Ear: No drainage or swelling. No middle ear effusion.   Left Ear: No drainage or swelling.  No middle ear effusion.     Nose:  Septal deviation present. No mucosal edema, rhinorrhea or sinus tenderness. Turbinate hypertrophy.      Mouth/Throat  Oropharynx clear and moist without lesions or asymmetry, normal uvula midline and mirror exam normal. Normal dentition. Uvula swelling present. No lacerations or trismus. No oropharyngeal exudate. Tonsillar erythema, tonsillar exudate.      Neck:  Full range of motion with neck supple and no adenopathy. Thyroid tenderness is present. No tracheal deviation, no edema, no erythema, normal range of motion, no stridor, no crepitus and no neck rigidity present. No thyroid mass present.     Cardiovascular:   Intact distal pulses and normal pulses.       Pulmonary/Chest:   Effort normal and breath sounds normal. No stridor.     Psychiatric:   His speech is normal and behavior is normal. His mood appears not anxious. His affect is not labile.     Neurological:   He is alert and oriented to person, place, and time. No sensory deficit.     Skin:   No abrasions, lacerations, lesions, or rashes. No abrasion and no bruising noted.         Tests / Results:  Reviewed     Assessment:       1. Nasal obstruction    2. Hypertrophy of both inferior nasal turbinates    3. Nasal septal deviation    4. Snoring    5. Elongated uvula, acquired          Plan:         Septo, ITR/ uvulectomy as planned

## 2020-03-06 NOTE — TELEPHONE ENCOUNTER
----- Message from Faraz Pastor MD sent at 3/6/2020 10:58 AM CST -----   Follow-up Monday for splint removal

## 2020-03-09 ENCOUNTER — OFFICE VISIT (OUTPATIENT)
Dept: OTOLARYNGOLOGY | Facility: CLINIC | Age: 50
End: 2020-03-09
Payer: COMMERCIAL

## 2020-03-09 VITALS
HEART RATE: 65 BPM | BODY MASS INDEX: 29.76 KG/M2 | SYSTOLIC BLOOD PRESSURE: 180 MMHG | RESPIRATION RATE: 14 BRPM | WEIGHT: 189.63 LBS | OXYGEN SATURATION: 97 % | TEMPERATURE: 98 F | HEIGHT: 67 IN | DIASTOLIC BLOOD PRESSURE: 80 MMHG

## 2020-03-09 VITALS — WEIGHT: 181.44 LBS | HEIGHT: 67 IN | BODY MASS INDEX: 28.48 KG/M2

## 2020-03-09 DIAGNOSIS — Z98.890 S/P NASAL SEPTOPLASTY: Primary | ICD-10-CM

## 2020-03-09 PROCEDURE — 99024 POSTOP FOLLOW-UP VISIT: CPT | Mod: ,,, | Performed by: OTOLARYNGOLOGY

## 2020-03-09 PROCEDURE — 99024 PR POST-OP FOLLOW-UP VISIT: ICD-10-PCS | Mod: ,,, | Performed by: OTOLARYNGOLOGY

## 2020-03-09 PROCEDURE — 99999 PR PBB SHADOW E&M-EST. PATIENT-LVL II: CPT | Mod: PBBFAC,,, | Performed by: OTOLARYNGOLOGY

## 2020-03-09 PROCEDURE — 99212 OFFICE O/P EST SF 10 MIN: CPT | Mod: PBBFAC,PO | Performed by: OTOLARYNGOLOGY

## 2020-03-09 PROCEDURE — 99999 PR PBB SHADOW E&M-EST. PATIENT-LVL II: ICD-10-PCS | Mod: PBBFAC,,, | Performed by: OTOLARYNGOLOGY

## 2020-03-09 NOTE — PROGRESS NOTES
Brennan is here for a post-operative visit following Septoplasty, Inferior Turbinate Reduction, uvulectomy    No issues, doing well  Mild bleeding, as expected  Throat pain as expected    Exam:  Alert, active, appropriate  Nasal splints removed  Nasal cavity suctioned, patent  No septal hematoma or abnormal crusting      Healing well  Follow-up 1 month

## 2020-03-09 NOTE — ANESTHESIA POSTPROCEDURE EVALUATION
Anesthesia Post Evaluation    Patient: Brennan Bruner    Procedure(s) Performed: Procedure(s) (LRB):  SEPTOPLASTY (N/A)  REDUCTION-TURBINATE (Bilateral)  UVULECTOMY (Bilateral)    Final Anesthesia Type: general    Patient location during evaluation: PACU  Patient participation: Yes- Able to Participate  Level of consciousness: awake and alert  Post-procedure vital signs: reviewed and stable  Pain management: adequate  Airway patency: patent    PONV status at discharge: No PONV  Anesthetic complications: no      Cardiovascular status: blood pressure returned to baseline and hemodynamically stable  Respiratory status: unassisted  Hydration status: euvolemic  Follow-up not needed.          Vitals Value Taken Time   /80 3/6/2020  2:00 PM   Temp 36.4 °C (97.6 °F) 3/6/2020 12:50 PM   Pulse 65 3/6/2020  2:00 PM   Resp 14 3/6/2020  2:00 PM   SpO2 97 % 3/6/2020  2:00 PM         Event Time     Out of Recovery 13:10:00          Pain/Jacqueline Score: No data recorded

## 2020-03-12 ENCOUNTER — PATIENT MESSAGE (OUTPATIENT)
Dept: OTOLARYNGOLOGY | Facility: CLINIC | Age: 50
End: 2020-03-12

## 2020-03-17 ENCOUNTER — TELEPHONE (OUTPATIENT)
Dept: OTOLARYNGOLOGY | Facility: CLINIC | Age: 50
End: 2020-03-17

## 2020-03-17 NOTE — TELEPHONE ENCOUNTER
TRISTINOV for pt to call me back. Refaxed [facility] paperwork to number on the bottom of the form.

## 2020-03-17 NOTE — TELEPHONE ENCOUNTER
----- Message from Bambi Rosales sent at 3/17/2020  9:37 AM CDT -----  Contact: self [phone]   Patient is requesting a call back from the nurse checking the status of return to work form that was submitted to the patient portal on 3/12/2020.  Patient stated he have not heard back from anyone.  Patient returned to work on 3/16/2020.    Please call the patient upon request at phone number [phone].

## 2020-03-31 ENCOUNTER — TELEPHONE (OUTPATIENT)
Dept: OTOLARYNGOLOGY | Facility: CLINIC | Age: 50
End: 2020-03-31

## 2020-03-31 NOTE — TELEPHONE ENCOUNTER
S/w pt that states he is doing well, getting better everyday. Pt is still doing his sinus rinse 1-2x daily. No nasl congestion at this time. Pt agreed to reschedule to may but he will call if any issues arise for a sooner appt.

## 2020-05-06 ENCOUNTER — OFFICE VISIT (OUTPATIENT)
Dept: OTOLARYNGOLOGY | Facility: CLINIC | Age: 50
End: 2020-05-06
Payer: COMMERCIAL

## 2020-05-06 VITALS — TEMPERATURE: 99 F | WEIGHT: 181.44 LBS | HEIGHT: 67 IN | BODY MASS INDEX: 28.48 KG/M2

## 2020-05-06 DIAGNOSIS — Z98.890 S/P NASAL SEPTOPLASTY: Primary | ICD-10-CM

## 2020-05-06 PROCEDURE — 99024 PR POST-OP FOLLOW-UP VISIT: ICD-10-PCS | Mod: ,,, | Performed by: OTOLARYNGOLOGY

## 2020-05-06 PROCEDURE — 99212 OFFICE O/P EST SF 10 MIN: CPT | Mod: PBBFAC,PO | Performed by: OTOLARYNGOLOGY

## 2020-05-06 PROCEDURE — 99024 POSTOP FOLLOW-UP VISIT: CPT | Mod: ,,, | Performed by: OTOLARYNGOLOGY

## 2020-05-06 PROCEDURE — 99999 PR PBB SHADOW E&M-EST. PATIENT-LVL II: CPT | Mod: PBBFAC,,, | Performed by: OTOLARYNGOLOGY

## 2020-05-06 PROCEDURE — 99999 PR PBB SHADOW E&M-EST. PATIENT-LVL II: ICD-10-PCS | Mod: PBBFAC,,, | Performed by: OTOLARYNGOLOGY

## 2020-05-06 NOTE — PROGRESS NOTES
Brennan is here for a post-operative visit following Septoplasty, Inferior Turbinate Reduction, uvulectomy    Doing well  Breathing improved  Still snoring  Using saline    Exam:  Alert, active, appropriate  Widely patent nasal cavities, septum straight, turbinates reduced      Well healed  Saline prn exposure to allergens and add INS or Astelin if persistent  Follow-up as needed

## 2020-08-25 PROBLEM — L57.0 ACTINIC KERATOSIS: Status: ACTIVE | Noted: 2020-08-25

## 2020-08-25 PROBLEM — K20.0 EOSINOPHILIC ESOPHAGITIS: Status: ACTIVE | Noted: 2020-08-25

## 2020-08-25 PROBLEM — K58.2 IRRITABLE BOWEL SYNDROME WITH BOTH CONSTIPATION AND DIARRHEA: Status: ACTIVE | Noted: 2020-08-25

## 2020-08-25 PROBLEM — Z90.89: Status: ACTIVE | Noted: 2020-08-25

## 2020-08-25 PROBLEM — Z98.890 H/O LUMBAR DISCECTOMY: Status: ACTIVE | Noted: 2020-08-25

## 2020-08-25 PROBLEM — I10 ESSENTIAL HYPERTENSION: Status: ACTIVE | Noted: 2020-08-25

## 2020-08-25 PROBLEM — R55 VASOVAGAL SYNCOPE: Status: ACTIVE | Noted: 2020-08-25

## 2020-08-25 PROBLEM — G47.00 INSOMNIA: Status: ACTIVE | Noted: 2020-08-25

## 2020-08-25 PROBLEM — Z98.890: Status: ACTIVE | Noted: 2020-08-25

## 2020-08-25 PROBLEM — G60.9 IDIOPATHIC PERIPHERAL NEUROPATHY: Status: ACTIVE | Noted: 2020-08-25

## 2020-08-25 PROBLEM — M48.061 SPINAL STENOSIS OF LUMBAR REGION: Status: ACTIVE | Noted: 2020-08-25

## 2020-08-25 PROBLEM — J31.0 CHRONIC RHINITIS: Status: ACTIVE | Noted: 2020-08-25

## 2020-08-25 PROBLEM — K57.90 DIVERTICULOSIS: Status: ACTIVE | Noted: 2020-08-25

## 2020-08-25 PROBLEM — N28.1 RENAL CYST: Status: ACTIVE | Noted: 2020-08-25

## 2020-08-25 PROBLEM — M47.816 FACET ARTHRITIS OF LUMBAR REGION: Status: ACTIVE | Noted: 2020-08-25

## 2020-08-25 PROBLEM — L21.9 SEBORRHEIC DERMATITIS: Status: ACTIVE | Noted: 2020-08-25

## 2020-08-25 PROBLEM — H93.13 TINNITUS OF BOTH EARS: Status: ACTIVE | Noted: 2020-08-25

## 2020-08-25 PROBLEM — Z98.890 H/O SINUS SURGERY: Status: ACTIVE | Noted: 2020-08-25

## 2020-08-25 PROBLEM — K21.9 GERD (GASTROESOPHAGEAL REFLUX DISEASE): Status: ACTIVE | Noted: 2020-08-25

## 2020-08-25 PROBLEM — M21.372 LEFT FOOT DROP: Status: ACTIVE | Noted: 2020-08-25

## 2020-08-25 PROBLEM — N52.9 ERECTILE DYSFUNCTION: Status: ACTIVE | Noted: 2020-08-25

## 2020-08-25 PROBLEM — Z98.1 S/P CERVICAL SPINAL FUSION: Status: ACTIVE | Noted: 2020-08-25

## 2020-08-25 PROBLEM — J30.1 NON-SEASONAL ALLERGIC RHINITIS DUE TO POLLEN: Status: ACTIVE | Noted: 2020-08-25

## 2020-09-01 PROBLEM — M25.311 INSTABILITY OF RIGHT SHOULDER JOINT: Status: ACTIVE | Noted: 2020-09-01

## 2020-09-01 PROBLEM — M75.42 ROTATOR CUFF IMPINGEMENT SYNDROME OF LEFT SHOULDER: Status: ACTIVE | Noted: 2020-09-01

## 2020-09-11 ENCOUNTER — LAB VISIT (OUTPATIENT)
Dept: LAB | Facility: HOSPITAL | Age: 50
End: 2020-09-11
Attending: INTERNAL MEDICINE
Payer: OTHER GOVERNMENT

## 2020-09-11 ENCOUNTER — OFFICE VISIT (OUTPATIENT)
Dept: RHEUMATOLOGY | Facility: CLINIC | Age: 50
End: 2020-09-11
Payer: OTHER GOVERNMENT

## 2020-09-11 VITALS
HEART RATE: 80 BPM | WEIGHT: 183.44 LBS | HEIGHT: 67 IN | BODY MASS INDEX: 28.79 KG/M2 | DIASTOLIC BLOOD PRESSURE: 88 MMHG | SYSTOLIC BLOOD PRESSURE: 129 MMHG

## 2020-09-11 DIAGNOSIS — G89.4 CHRONIC PAIN SYNDROME: ICD-10-CM

## 2020-09-11 DIAGNOSIS — G89.4 CHRONIC PAIN SYNDROME: Primary | ICD-10-CM

## 2020-09-11 DIAGNOSIS — Z71.89 COUNSELING ON HEALTH PROMOTION AND DISEASE PREVENTION: ICD-10-CM

## 2020-09-11 LAB
CRP SERPL-MCNC: 2 MG/L (ref 0–8.2)
ERYTHROCYTE [SEDIMENTATION RATE] IN BLOOD BY WESTERGREN METHOD: 13 MM/HR (ref 0–23)

## 2020-09-11 PROCEDURE — 99999 PR PBB SHADOW E&M-EST. PATIENT-LVL IV: CPT | Mod: PBBFAC,,, | Performed by: INTERNAL MEDICINE

## 2020-09-11 PROCEDURE — 84165 PATHOLOGIST INTERPRETATION SPE: ICD-10-PCS | Mod: 26,,, | Performed by: PATHOLOGY

## 2020-09-11 PROCEDURE — 99205 OFFICE O/P NEW HI 60 MIN: CPT | Mod: S$PBB,,, | Performed by: INTERNAL MEDICINE

## 2020-09-11 PROCEDURE — 84165 PROTEIN E-PHORESIS SERUM: CPT

## 2020-09-11 PROCEDURE — 81374 HLA I TYPING 1 ANTIGEN LR: CPT

## 2020-09-11 PROCEDURE — 99214 OFFICE O/P EST MOD 30 MIN: CPT | Mod: PBBFAC | Performed by: INTERNAL MEDICINE

## 2020-09-11 PROCEDURE — 82570 ASSAY OF URINE CREATININE: CPT

## 2020-09-11 PROCEDURE — 86140 C-REACTIVE PROTEIN: CPT

## 2020-09-11 PROCEDURE — 85652 RBC SED RATE AUTOMATED: CPT

## 2020-09-11 PROCEDURE — 86200 CCP ANTIBODY: CPT

## 2020-09-11 PROCEDURE — 86038 ANTINUCLEAR ANTIBODIES: CPT

## 2020-09-11 PROCEDURE — 36415 COLL VENOUS BLD VENIPUNCTURE: CPT

## 2020-09-11 PROCEDURE — 99205 PR OFFICE/OUTPT VISIT, NEW, LEVL V, 60-74 MIN: ICD-10-PCS | Mod: S$PBB,,, | Performed by: INTERNAL MEDICINE

## 2020-09-11 PROCEDURE — 84165 PROTEIN E-PHORESIS SERUM: CPT | Mod: 26,,, | Performed by: PATHOLOGY

## 2020-09-11 PROCEDURE — 99999 PR PBB SHADOW E&M-EST. PATIENT-LVL IV: ICD-10-PCS | Mod: PBBFAC,,, | Performed by: INTERNAL MEDICINE

## 2020-09-11 PROCEDURE — 86431 RHEUMATOID FACTOR QUANT: CPT

## 2020-09-11 RX ORDER — DULOXETIN HYDROCHLORIDE 20 MG/1
20 CAPSULE, DELAYED RELEASE ORAL DAILY
COMMUNITY
Start: 2020-09-09 | End: 2021-05-16

## 2020-09-11 NOTE — PROGRESS NOTES
RHEUMATOLOGY OUTPATIENT CLINIC NOTE    9/11/2020    Attending Rheumatologist: Rene Boles  Primary Care Provider: Jayde Fleming MD   Physician Requesting Consultation: Aaareferral Self  No address on file  Chief Complaint/Reason For Consultation:  Chronic pain    Subjective:       HPI  Brennan Bruner is a 50 y.o. White male with chronic pain comes for rheumatic evaluation.  Main complaint of constant fatigue and generalized arthralgias.  Onset several months ago and worsening without any particular precipitating event.  Association with non restorative sleep.  Diagnosed with obstructive sleep apnea, currently not on CPAP machine.  Associated with daytime sleepiness and mental fogginess.  Pain is worse on his lower back.  Worst during the evening, aggravated by range of motion/weight bearing, relieved somewhat by rest and OTC pain medication.  Denies association with prolonged morning stiffness, fever, or persistent paresthesias/other focal neurological symptoms.    Review of Systems   Constitutional: Positive for malaise/fatigue. Negative for chills and fever.   Eyes: Negative for pain and redness.        No history uveitis.   Respiratory: Negative for cough, hemoptysis and shortness of breath.    Cardiovascular: Negative for chest pain and leg swelling.   Gastrointestinal: Negative for abdominal pain, blood in stool and melena.        No history of IBD.   Genitourinary: Negative for dysuria and hematuria.   Musculoskeletal: Positive for back pain (Chronic, mechanical features.  No alarm signs or symptoms.), joint pain (Generalized, mechanical pattern with neuropathic features.) and neck pain (Chronic, mechanical features.  No alarm signs or symptoms). Negative for falls.   Skin: Negative for rash.        No personal or family history of psoriasis.  Denies photosensitivity, ulcers, or Raynaud's phenomena.   Neurological: Positive for weakness. Negative for tingling and focal weakness.    Psychiatric/Behavioral: Negative for memory loss (Mental fogginess.). The patient does not have insomnia.        Chronic comorbid conditions affecting medical decision making today:  Past Medical History:   Diagnosis Date    Arthritis     C. difficile diarrhea     Eosinophilic esophagitis     HTN (hypertension)     Irritable bowel syndrome with both constipation and diarrhea 8/25/2020    S/P cervical spinal fusion 8/25/2020 2016      Past Surgical History:   Procedure Laterality Date    ANTERIOR CERVICAL DISCECTOMY W/ FUSION  2016    dr banegas    BACK SURGERY  2007    lumbar discectomy L4/l5    COLONOSCOPY N/A 9/10/2018    Procedure: COLONOSCOPY;  Surgeon: Gonzalo Andrea Jr., MD;  Location: [facility] ENDO;  Service: Endoscopy;  Laterality: N/A;    EYE SURGERY Bilateral 2002    lasik    INJECTION OF ANESTHETIC AGENT AROUND MEDIAL BRANCH NERVES INNERVATING LUMBAR FACET JOINT Bilateral 3/26/2019    Procedure: Block-nerve-medial branch-lumbar L3,4,5;  Surgeon: Trip Pollack MD;  Location: [facility] OR;  Service: Pain Management;  Laterality: Bilateral;    INJECTION OF FACET JOINT Bilateral 2/1/2019    Procedure: INJECTION, FACET JOINT L4/5 and L5/S1;  Surgeon: Trip Pollack MD;  Location: [facility] OR;  Service: Pain Management;  Laterality: Bilateral;    NASAL SEPTOPLASTY N/A 3/6/2020    Procedure: SEPTOPLASTY;  Surgeon: Faraz Pastor MD;  Location: [facility] OR;  Service: ENT;  Laterality: N/A;    NASAL TURBINATE REDUCTION Bilateral 3/6/2020    Procedure: REDUCTION-TURBINATE;  Surgeon: Faraz Pastor MD;  Location: [facility] OR;  Service: ENT;  Laterality: Bilateral;    RADIOFREQUENCY ABLATION OF LUMBAR MEDIAL BRANCH NERVE AT SINGLE LEVEL Bilateral 4/9/2019    Procedure: Radiofrequency Ablation, Nerve, Spinal, Lumbar, Medial Branch L3,4,5;  Surgeon: Trip Pollack MD;  Location: [facility] OR;  Service: Pain Management;  Laterality: Bilateral;    SHOULDER SURGERY Right 1993    right posterior  capsulorraphy    UPPER GASTROINTESTINAL ENDOSCOPY  06/17/2013    [place]- sent for scanning: stenosis s/p dilation, biopsied GEJ, gastritis, antritis (hard to read handwriting); biospy: antrum mild reactive gastropathy, negative h pylori; GEJ: active esophagitis with increased intraepithelial eosinophils; gastric type mucosa with chronic and active inflammation, no kwan's (no proximal esophagus biopsy done)    UPPER GASTROINTESTINAL ENDOSCOPY  08/31/2017    Dr. Andrea     Family History   Problem Relation Age of Onset    Migraines Mother     Hyperlipidemia Sister     Hypertension Maternal Grandmother     Heart attack Maternal Grandmother     Hypertension Maternal Grandfather     Heart attack Maternal Grandfather     Heart attack Paternal Grandmother     Hypertension Paternal Grandfather     Heart attack Paternal Grandfather     Colon cancer Neg Hx     Colon polyps Neg Hx     Crohn's disease Neg Hx     Ulcerative colitis Neg Hx     Stomach cancer Neg Hx     Esophageal cancer Neg Hx      Social History     Substance and Sexual Activity   Alcohol Use Yes    Alcohol/week: 2.0 - 3.0 standard drinks    Types: 2 - 3 Cans of beer per week    Comment: weekends     Social History     Tobacco Use   Smoking Status Never Smoker   Smokeless Tobacco Never Used     Social History     Substance and Sexual Activity   Drug Use No       Current Outpatient Medications:     atorvastatin (LIPITOR) 20 MG tablet, Take 20 mg by mouth once daily., Disp: , Rfl:     atropine-phenobarbital-scopolamine-hyoscyamine (DONNATAL) 16.2-0.1037 -0.0194 mg Tab, Take 1 tablet by mouth 3 (three) times daily as needed., Disp: , Rfl:     azelastine (ASTELIN) 137 mcg (0.1 %) nasal spray, 2 sprays (274 mcg total) by Nasal route 2 (two) times daily., Disp: 90 mL, Rfl: 3    docusate sodium (COLACE) 50 MG capsule, Take 50 mg by mouth every 12 (twelve) hours as needed for Constipation., Disp: , Rfl:     DULoxetine (CYMBALTA) 20 MG  "capsule, Take 20 mg by mouth once daily., Disp: , Rfl:     ezetimibe (ZETIA) 10 mg tablet, Take 10 mg by mouth once daily., Disp: , Rfl:     fluticasone propionate (FLONASE) 50 mcg/actuation nasal spray, 1 spray by Each Nostril route once daily., Disp: , Rfl:     HYDROcodone-acetaminophen (NORCO)  mg per tablet, Take 1 tablet by mouth 2 (two) times daily., Disp: , Rfl:     loperamide (IMODIUM) 2 mg capsule, Take 2 mg by mouth 4 (four) times daily as needed for Diarrhea., Disp: , Rfl:     naproxen (NAPROSYN) 500 MG tablet, Take 500 mg by mouth 2 (two) times daily., Disp: , Rfl:     ondansetron (ZOFRAN-ODT) 4 MG TbDL, PLACE 1 TABLET ON THE TONGUE EVERY 6 HOURS AS NEEDED, Disp: 12 tablet, Rfl: 3    promethazine (PHENERGAN) 25 MG tablet, Take 25 mg by mouth every 4 (four) hours as needed., Disp: , Rfl:     tadalafil (CIALIS) 5 MG tablet, Take 5 mg by mouth once as needed., Disp: , Rfl:     tiZANidine 4 mg Cap, Take by mouth., Disp: , Rfl:     valsartan (DIOVAN) 160 MG tablet, Take 1 tablet (160 mg total) by mouth once daily., Disp: 90 tablet, Rfl: 0    vitamin D (VITAMIN D3) 1000 units Tab, Take 1,000 Units by mouth once daily., Disp: , Rfl:     zolpidem (AMBIEN) 10 mg Tab, Take 10 mg by mouth nightly as needed., Disp: , Rfl:     pantoprazole (PROTONIX) 40 MG tablet, Take 1 tablet (40 mg total) by mouth before breakfast., Disp: 90 tablet, Rfl: 4     Objective:         Vitals:    09/11/20 1338   BP: 129/88   Pulse: 80     Physical Exam   Constitutional: No distress.   Estimated body mass index is 28.73 kg/m² as calculated from the following:    Height as of this encounter: 5' 7" (1.702 m).    Weight as of this encounter: 83.2 kg (183 lb 6.8 oz).    Wt Readings from Last 1 Encounters:  09/11/20 1338 : 83.2 kg (183 lb 6.8 oz)     HENT:   Head: Normocephalic and atraumatic.   Eyes: Conjunctivae are normal. Pupils are equal, round, and reactive to light.   Neck: Normal range of motion.   Cardiovascular: " Normal rate and intact distal pulses.    Pulmonary/Chest: Effort normal. No respiratory distress.   Abdominal: Soft. He exhibits no distension.   Neurological: He is alert. Gait normal.   Skin: No rash noted. No erythema.     Musculoskeletal: Normal range of motion.      Comments: :  Able to make full fist without difficulty  No synovitis or significant squeeze tenderness    AROM: intact  PROM: intact    Devices used by patient: none       Reviewed old and all outside pertinent medical records available.    All lab results personally reviewed and interpreted by me.  Lab Results   Component Value Date    WBC 10.59 06/15/2018    HGB 16.6 06/15/2018    HCT 49.3 06/15/2018    MCV 89 06/15/2018    MCH 29.8 06/15/2018    MCHC 33.7 06/15/2018    RDW 12.2 06/15/2018     06/15/2018    MPV 9.7 06/15/2018       Lab Results   Component Value Date     06/15/2018    K 5.1 06/15/2018     06/15/2018    CO2 30 (H) 06/15/2018    GLU 95 06/15/2018    BUN 13 06/15/2018    CALCIUM 10.1 06/15/2018    PROT 7.5 06/15/2018    ALBUMIN 4.2 06/15/2018    BILITOT 0.6 06/15/2018    AST 24 06/15/2018    ALKPHOS 64 06/15/2018    ALT 30 06/15/2018       No results found for: COLORU, APPEARANCEUA, SPECGRAV, PHUR, PROTEINUA, GLUCOSEU, KETONESU, BLOODU, LEUKOCYTESUR, NITRITE, UROBILINOGEN    Lab Results   Component Value Date    CRP 2.6 06/15/2018       Lab Results   Component Value Date    SEDRATE 2 06/15/2018       Lab Results   Component Value Date    SEDRATE 2 06/15/2018       No components found for: 25OHVITDTOT, 19SSCIHL4, 16COCGPQ5, METHODNOTE    No results found for: URICACID    No components found for: TSPOTTB    Rheum Labs:   n/a     Imaging:  All imaging reviewed and independently interpreted by me.    X-ray C-spine August 2017   No displaced fracture or dislocation is appreciated.  The anterior plate and screw hardware appears grossly intact C5-C7    CT lumbar spine December 2018  1. There is no fracture or  malalignment.  There is mild degenerative change.  The spinal canal is borderline small on a developmental basis.  Disc space narrowing is most apparent at the L4-5 level and there may be minimal disc space narrowing at the L5-S1 level.  2. There is no spinal canal or foraminal stenosis at the T11-12, T12-L1, L1-2 levels.  3. At the L2-3 level there is borderline spinal stenosis without foraminal stenosis.  4. At the L3-4 level there is mild spinal canal and bilateral foraminal stenosis.  5. At the L4-5 level there is mild spinal stenosis with mild-to-moderate left and mild right foraminal stenosis.  6. At the L5-S1 level there is no spinal stenosis.  There is mild left foraminal stenosis.  There is a disc bulge with small left paracentral disc protrusion/osteophyte which may just contact the left S1 root.  7. Known right renal cyst.    X-ray shoulder September 2020  1.  Osseous alignment appears maintained.  No displaced fracture or dislocation is appreciated.  2. There are mild chronic degenerative changes present at the acromioclavicular junction and superolateral aspect of the humeral head.  This can also be seen with some chronic rotator cuff related changes.      ASSESSMENT / PLAN:     Brennan Bruner is a 50 y.o. White male with:    1. Chronic pain syndrome  - diffuse pain for >3 mos; hx fatigue, sleep disturbance, anxiety, depression; cognitive difficulties, stiffness  - currently without generalized synovitis, serositis, rash, fever, or cytopenias  - address contributing sleep disturbance/insomnia per PMD  - Sleep hygiene and stress management  - recently started on duloxetine by PMD  - Counseled on adverse medications reactions: constipation, dryness, sedation, GI upset, serotonin syndrome (except Pregabalin and gabapentin)  - manage expectations, reassurance, Physical activity  - consider physical therapy  - LONI Screen w/Reflex; Future  - C-Reactive Protein; Standing  - Sedimentation rate; Standing  -  Rheumatoid factor; Future  - Cyclic Citrullinated Peptide Antibody, IgG; Future  - Protein electrophoresis, serum; Standing  - Protein / creatinine ratio, urine; Standing  - HLA B27 Antigen; Future    2. Other specified counseling  - over 10 minutes spent regarding below topics:  - Nutrition and exercise counseling.  - Limitation of alcohol consumption.  - Regular exercise:  Aerobic and resistance.  - Medication counseling provided.    No follow-ups on file.   RTC 5 months    Method of contact with patient concerns: [facility]    Disclaimer:  This note is prepared using voice recognition software and as such is likely to have errors and has not been proof read. Please contact me for questions.     Time spent: 60 minutes in face to face discussion concerning diagnosis, prognosis, review of lab and test results, benefits of treatment as well as management of disease, counseling of patient and coordination of care between various health care providers.  Greater than half the time spent was used for coordination of care and counseling of patient.    Rene Boles M.D.  Rheumatology Department   [facility] [facility]

## 2020-09-12 LAB
CCP AB SER IA-ACNC: 0.6 U/ML
CREAT UR-MCNC: 80 MG/DL (ref 23–375)
PROT UR-MCNC: 7 MG/DL (ref 0–15)
PROT/CREAT UR: 0.09 MG/G{CREAT} (ref 0–0.2)
RHEUMATOID FACT SERPL-ACNC: 10 IU/ML (ref 0–15)

## 2020-09-14 LAB
ALBUMIN SERPL ELPH-MCNC: 4.44 G/DL (ref 3.35–5.55)
ALPHA1 GLOB SERPL ELPH-MCNC: 0.27 G/DL (ref 0.17–0.41)
ALPHA2 GLOB SERPL ELPH-MCNC: 0.72 G/DL (ref 0.43–0.99)
ANA SER QL IF: NORMAL
B-GLOBULIN SERPL ELPH-MCNC: 0.84 G/DL (ref 0.5–1.1)
GAMMA GLOB SERPL ELPH-MCNC: 0.83 G/DL (ref 0.67–1.58)
PROT SERPL-MCNC: 7.1 G/DL (ref 6–8.4)

## 2020-09-15 LAB — PATHOLOGIST INTERPRETATION SPE: NORMAL

## 2020-09-18 LAB
HLA B27 INTERPRETATION: NORMAL
HLA-B27 RELATED AG QL: NEGATIVE
HLA-B27 RELATED AG QL: NORMAL

## 2021-05-14 ENCOUNTER — OFFICE VISIT (OUTPATIENT)
Dept: PAIN MEDICINE | Facility: CLINIC | Age: 51
End: 2021-05-14
Payer: OTHER GOVERNMENT

## 2021-05-14 VITALS
HEIGHT: 66 IN | DIASTOLIC BLOOD PRESSURE: 83 MMHG | BODY MASS INDEX: 29.27 KG/M2 | HEART RATE: 80 BPM | WEIGHT: 182.13 LBS | SYSTOLIC BLOOD PRESSURE: 142 MMHG | OXYGEN SATURATION: 98 %

## 2021-05-14 DIAGNOSIS — M50.30 DDD (DEGENERATIVE DISC DISEASE), CERVICAL: ICD-10-CM

## 2021-05-14 DIAGNOSIS — M54.12 RADICULOPATHY, CERVICAL REGION: ICD-10-CM

## 2021-05-14 DIAGNOSIS — M54.16 BILATERAL LUMBAR RADICULOPATHY: Primary | ICD-10-CM

## 2021-05-14 DIAGNOSIS — M54.9 DORSALGIA, UNSPECIFIED: ICD-10-CM

## 2021-05-14 PROCEDURE — 99214 PR OFFICE/OUTPT VISIT, EST, LEVL IV, 30-39 MIN: ICD-10-PCS | Mod: S$PBB,,, | Performed by: ANESTHESIOLOGY

## 2021-05-14 PROCEDURE — 99213 OFFICE O/P EST LOW 20 MIN: CPT | Mod: PBBFAC,PN | Performed by: ANESTHESIOLOGY

## 2021-05-14 PROCEDURE — 99999 PR PBB SHADOW E&M-EST. PATIENT-LVL III: ICD-10-PCS | Mod: PBBFAC,,, | Performed by: ANESTHESIOLOGY

## 2021-05-14 PROCEDURE — 99999 PR PBB SHADOW E&M-EST. PATIENT-LVL III: CPT | Mod: PBBFAC,,, | Performed by: ANESTHESIOLOGY

## 2021-05-14 PROCEDURE — 99214 OFFICE O/P EST MOD 30 MIN: CPT | Mod: S$PBB,,, | Performed by: ANESTHESIOLOGY

## 2021-05-16 ENCOUNTER — PATIENT MESSAGE (OUTPATIENT)
Dept: PAIN MEDICINE | Facility: CLINIC | Age: 51
End: 2021-05-16

## 2021-05-17 ENCOUNTER — TELEPHONE (OUTPATIENT)
Dept: PAIN MEDICINE | Facility: CLINIC | Age: 51
End: 2021-05-17

## 2021-05-17 DIAGNOSIS — Z01.818 PRE-OP TESTING: ICD-10-CM

## 2021-05-17 DIAGNOSIS — M54.16 LUMBAR RADICULOPATHY: Primary | ICD-10-CM

## 2021-05-17 RX ORDER — ALPRAZOLAM 0.5 MG/1
1 TABLET, ORALLY DISINTEGRATING ORAL ONCE AS NEEDED
Status: CANCELLED | OUTPATIENT
Start: 2021-05-21 | End: 2032-10-16

## 2021-05-18 ENCOUNTER — LAB VISIT (OUTPATIENT)
Dept: FAMILY MEDICINE | Facility: CLINIC | Age: 51
End: 2021-05-18
Payer: OTHER GOVERNMENT

## 2021-05-18 DIAGNOSIS — Z01.818 PRE-OP TESTING: ICD-10-CM

## 2021-05-18 PROCEDURE — U0005 INFEC AGEN DETEC AMPLI PROBE: HCPCS | Performed by: ANESTHESIOLOGY

## 2021-05-18 PROCEDURE — U0003 INFECTIOUS AGENT DETECTION BY NUCLEIC ACID (DNA OR RNA); SEVERE ACUTE RESPIRATORY SYNDROME CORONAVIRUS 2 (SARS-COV-2) (CORONAVIRUS DISEASE [COVID-19]), AMPLIFIED PROBE TECHNIQUE, MAKING USE OF HIGH THROUGHPUT TECHNOLOGIES AS DESCRIBED BY CMS-2020-01-R: HCPCS | Performed by: ANESTHESIOLOGY

## 2021-05-19 LAB — SARS-COV-2 RNA RESP QL NAA+PROBE: NOT DETECTED

## 2021-05-21 ENCOUNTER — HOSPITAL ENCOUNTER (OUTPATIENT)
Facility: HOSPITAL | Age: 51
Discharge: HOME | End: 2021-05-21
Attending: ANESTHESIOLOGY | Admitting: ANESTHESIOLOGY
Payer: OTHER GOVERNMENT

## 2021-05-21 ENCOUNTER — HOSPITAL ENCOUNTER (OUTPATIENT)
Dept: RADIOLOGY | Facility: HOSPITAL | Age: 51
Discharge: HOME | End: 2021-05-21
Attending: ANESTHESIOLOGY | Admitting: ANESTHESIOLOGY
Payer: OTHER GOVERNMENT

## 2021-05-21 VITALS
HEIGHT: 66 IN | OXYGEN SATURATION: 97 % | WEIGHT: 180 LBS | SYSTOLIC BLOOD PRESSURE: 142 MMHG | RESPIRATION RATE: 16 BRPM | HEART RATE: 72 BPM | TEMPERATURE: 98 F | DIASTOLIC BLOOD PRESSURE: 82 MMHG | BODY MASS INDEX: 28.93 KG/M2

## 2021-05-21 DIAGNOSIS — M54.16 LUMBAR RADICULOPATHY: ICD-10-CM

## 2021-05-21 DIAGNOSIS — M54.16 BILATERAL LUMBAR RADICULOPATHY: ICD-10-CM

## 2021-05-21 PROCEDURE — 76000 FLUOROSCOPY <1 HR PHYS/QHP: CPT | Mod: TC,PO

## 2021-05-21 PROCEDURE — 25000003 PHARM REV CODE 250: Mod: PO | Performed by: ANESTHESIOLOGY

## 2021-05-21 PROCEDURE — 64483 NJX AA&/STRD TFRM EPI L/S 1: CPT | Mod: 50,PO | Performed by: ANESTHESIOLOGY

## 2021-05-21 PROCEDURE — 25500020 PHARM REV CODE 255: Mod: PO | Performed by: ANESTHESIOLOGY

## 2021-05-21 PROCEDURE — 64483 NJX AA&/STRD TFRM EPI L/S 1: CPT | Mod: 50,,, | Performed by: ANESTHESIOLOGY

## 2021-05-21 PROCEDURE — 64483 PR EPIDURAL INJ, ANES/STEROID, TRANSFORAMINAL, LUMB/SACR, SNGL LEVL: ICD-10-PCS | Mod: 50,,, | Performed by: ANESTHESIOLOGY

## 2021-05-21 PROCEDURE — 63600175 PHARM REV CODE 636 W HCPCS: Mod: PO | Performed by: ANESTHESIOLOGY

## 2021-05-21 RX ORDER — METHYLPREDNISOLONE ACETATE 80 MG/ML
INJECTION, SUSPENSION INTRA-ARTICULAR; INTRALESIONAL; INTRAMUSCULAR; SOFT TISSUE
Status: DISCONTINUED | OUTPATIENT
Start: 2021-05-21 | End: 2021-05-21 | Stop reason: HOSPADM

## 2021-05-21 RX ORDER — LIDOCAINE HYDROCHLORIDE 10 MG/ML
INJECTION, SOLUTION EPIDURAL; INFILTRATION; INTRACAUDAL; PERINEURAL
Status: DISCONTINUED | OUTPATIENT
Start: 2021-05-21 | End: 2021-05-21 | Stop reason: HOSPADM

## 2021-05-21 RX ORDER — ALPRAZOLAM 0.5 MG/1
1 TABLET, ORALLY DISINTEGRATING ORAL ONCE AS NEEDED
Status: COMPLETED | OUTPATIENT
Start: 2021-05-21 | End: 2021-05-21

## 2021-05-21 RX ORDER — BUPIVACAINE HYDROCHLORIDE 2.5 MG/ML
INJECTION, SOLUTION EPIDURAL; INFILTRATION; INTRACAUDAL
Status: DISCONTINUED | OUTPATIENT
Start: 2021-05-21 | End: 2021-05-21 | Stop reason: HOSPADM

## 2021-05-21 RX ADMIN — ALPRAZOLAM 1 MG: 0.5 TABLET, ORALLY DISINTEGRATING ORAL at 01:05

## 2021-08-10 ENCOUNTER — OFFICE VISIT (OUTPATIENT)
Dept: PAIN MEDICINE | Facility: CLINIC | Age: 51
End: 2021-08-10
Payer: OTHER GOVERNMENT

## 2021-08-10 VITALS
HEART RATE: 79 BPM | HEIGHT: 67 IN | OXYGEN SATURATION: 98 % | TEMPERATURE: 98 F | WEIGHT: 176.5 LBS | DIASTOLIC BLOOD PRESSURE: 71 MMHG | RESPIRATION RATE: 20 BRPM | SYSTOLIC BLOOD PRESSURE: 135 MMHG | BODY MASS INDEX: 27.7 KG/M2

## 2021-08-10 DIAGNOSIS — Z11.9 SCREENING EXAMINATION FOR INFECTIOUS DISEASE: ICD-10-CM

## 2021-08-10 DIAGNOSIS — M47.816 LUMBAR SPONDYLOSIS: Primary | ICD-10-CM

## 2021-08-10 DIAGNOSIS — M54.16 BILATERAL LUMBAR RADICULOPATHY: ICD-10-CM

## 2021-08-10 DIAGNOSIS — M51.36 DDD (DEGENERATIVE DISC DISEASE), LUMBAR: ICD-10-CM

## 2021-08-10 PROCEDURE — 99214 OFFICE O/P EST MOD 30 MIN: CPT | Mod: PBBFAC,PN | Performed by: ANESTHESIOLOGY

## 2021-08-10 PROCEDURE — 99999 PR PBB SHADOW E&M-EST. PATIENT-LVL IV: CPT | Mod: PBBFAC,,, | Performed by: ANESTHESIOLOGY

## 2021-08-10 PROCEDURE — 99214 PR OFFICE/OUTPT VISIT, EST, LEVL IV, 30-39 MIN: ICD-10-PCS | Mod: S$PBB,,, | Performed by: ANESTHESIOLOGY

## 2021-08-10 PROCEDURE — 99999 PR PBB SHADOW E&M-EST. PATIENT-LVL IV: ICD-10-PCS | Mod: PBBFAC,,, | Performed by: ANESTHESIOLOGY

## 2021-08-10 PROCEDURE — 99214 OFFICE O/P EST MOD 30 MIN: CPT | Mod: S$PBB,,, | Performed by: ANESTHESIOLOGY

## 2021-08-10 RX ORDER — SODIUM CHLORIDE, SODIUM LACTATE, POTASSIUM CHLORIDE, CALCIUM CHLORIDE 600; 310; 30; 20 MG/100ML; MG/100ML; MG/100ML; MG/100ML
INJECTION, SOLUTION INTRAVENOUS CONTINUOUS
Status: CANCELLED | OUTPATIENT
Start: 2021-08-30

## 2021-08-27 ENCOUNTER — TELEPHONE (OUTPATIENT)
Dept: SURGERY | Facility: HOSPITAL | Age: 51
End: 2021-08-27

## 2021-08-27 ENCOUNTER — TELEPHONE (OUTPATIENT)
Dept: PAIN MEDICINE | Facility: CLINIC | Age: 51
End: 2021-08-27

## 2021-08-27 DIAGNOSIS — Z01.818 PRE-OP TESTING: ICD-10-CM

## 2021-09-13 ENCOUNTER — LAB VISIT (OUTPATIENT)
Dept: FAMILY MEDICINE | Facility: CLINIC | Age: 51
End: 2021-09-13
Payer: OTHER GOVERNMENT

## 2021-09-13 DIAGNOSIS — Z01.818 PRE-OP TESTING: ICD-10-CM

## 2021-09-13 PROCEDURE — U0003 INFECTIOUS AGENT DETECTION BY NUCLEIC ACID (DNA OR RNA); SEVERE ACUTE RESPIRATORY SYNDROME CORONAVIRUS 2 (SARS-COV-2) (CORONAVIRUS DISEASE [COVID-19]), AMPLIFIED PROBE TECHNIQUE, MAKING USE OF HIGH THROUGHPUT TECHNOLOGIES AS DESCRIBED BY CMS-2020-01-R: HCPCS | Performed by: ANESTHESIOLOGY

## 2021-09-13 PROCEDURE — U0005 INFEC AGEN DETEC AMPLI PROBE: HCPCS | Performed by: ANESTHESIOLOGY

## 2021-09-14 LAB
SARS-COV-2 RNA RESP QL NAA+PROBE: NOT DETECTED
SARS-COV-2- CYCLE NUMBER: NORMAL

## 2021-09-15 DIAGNOSIS — M47.816 LUMBAR SPONDYLOSIS: Primary | ICD-10-CM

## 2021-09-16 ENCOUNTER — HOSPITAL ENCOUNTER (OUTPATIENT)
Dept: RADIOLOGY | Facility: HOSPITAL | Age: 51
Discharge: HOME | End: 2021-09-16
Attending: ANESTHESIOLOGY
Payer: OTHER GOVERNMENT

## 2021-09-16 ENCOUNTER — HOSPITAL ENCOUNTER (OUTPATIENT)
Facility: HOSPITAL | Age: 51
Discharge: HOME | End: 2021-09-16
Attending: ANESTHESIOLOGY | Admitting: ANESTHESIOLOGY
Payer: OTHER GOVERNMENT

## 2021-09-16 DIAGNOSIS — M47.816 LUMBAR SPONDYLOSIS: ICD-10-CM

## 2021-09-16 PROCEDURE — A4216 STERILE WATER/SALINE, 10 ML: HCPCS | Mod: PO | Performed by: ANESTHESIOLOGY

## 2021-09-16 PROCEDURE — 64636 DESTROY L/S FACET JNT ADDL: CPT | Mod: PO | Performed by: ANESTHESIOLOGY

## 2021-09-16 PROCEDURE — 25000003 PHARM REV CODE 250: Mod: PO | Performed by: ANESTHESIOLOGY

## 2021-09-16 PROCEDURE — 64635 PR DESTROY LUMB/SAC FACET JNT: ICD-10-PCS | Mod: 50,,, | Performed by: ANESTHESIOLOGY

## 2021-09-16 PROCEDURE — 64636 PR DESTROY L/S FACET JNT ADDL: ICD-10-PCS | Mod: 50,,, | Performed by: ANESTHESIOLOGY

## 2021-09-16 PROCEDURE — 99152 MOD SED SAME PHYS/QHP 5/>YRS: CPT | Mod: ,,, | Performed by: ANESTHESIOLOGY

## 2021-09-16 PROCEDURE — 64635 DESTROY LUMB/SAC FACET JNT: CPT | Mod: 50,PO | Performed by: ANESTHESIOLOGY

## 2021-09-16 PROCEDURE — 64636 DESTROY L/S FACET JNT ADDL: CPT | Mod: 50,,, | Performed by: ANESTHESIOLOGY

## 2021-09-16 PROCEDURE — 63600175 PHARM REV CODE 636 W HCPCS: Mod: PO | Performed by: ANESTHESIOLOGY

## 2021-09-16 PROCEDURE — 64635 DESTROY LUMB/SAC FACET JNT: CPT | Mod: 50,,, | Performed by: ANESTHESIOLOGY

## 2021-09-16 PROCEDURE — 76000 FLUOROSCOPY <1 HR PHYS/QHP: CPT | Mod: TC,PO

## 2021-09-16 PROCEDURE — 99152 PR MOD CONSCIOUS SEDATION, SAME PHYS, 5+ YRS, FIRST 15 MIN: ICD-10-PCS | Mod: ,,, | Performed by: ANESTHESIOLOGY

## 2021-09-16 PROCEDURE — 64494 INJ PARAVERT F JNT L/S 2 LEV: CPT | Mod: 50,PO | Performed by: ANESTHESIOLOGY

## 2021-09-16 PROCEDURE — 64493 INJ PARAVERT F JNT L/S 1 LEV: CPT | Mod: 50,PO | Performed by: ANESTHESIOLOGY

## 2021-09-16 RX ORDER — LIDOCAINE HYDROCHLORIDE 10 MG/ML
INJECTION, SOLUTION EPIDURAL; INFILTRATION; INTRACAUDAL; PERINEURAL
Status: DISCONTINUED | OUTPATIENT
Start: 2021-09-16 | End: 2021-09-16 | Stop reason: HOSPADM

## 2021-09-16 RX ORDER — MIDAZOLAM HYDROCHLORIDE 1 MG/ML
INJECTION INTRAMUSCULAR; INTRAVENOUS
Status: DISCONTINUED | OUTPATIENT
Start: 2021-09-16 | End: 2021-09-16 | Stop reason: HOSPADM

## 2021-09-16 RX ORDER — LIDOCAINE HYDROCHLORIDE 20 MG/ML
INJECTION, SOLUTION EPIDURAL; INFILTRATION; INTRACAUDAL; PERINEURAL
Status: DISCONTINUED | OUTPATIENT
Start: 2021-09-16 | End: 2021-09-16 | Stop reason: HOSPADM

## 2021-09-16 RX ORDER — SODIUM CHLORIDE 9 MG/ML
INJECTION, SOLUTION INTRAMUSCULAR; INTRAVENOUS; SUBCUTANEOUS
Status: DISCONTINUED | OUTPATIENT
Start: 2021-09-16 | End: 2021-09-16 | Stop reason: HOSPADM

## 2021-09-16 RX ORDER — SODIUM CHLORIDE, SODIUM LACTATE, POTASSIUM CHLORIDE, CALCIUM CHLORIDE 600; 310; 30; 20 MG/100ML; MG/100ML; MG/100ML; MG/100ML
INJECTION, SOLUTION INTRAVENOUS CONTINUOUS
Status: DISCONTINUED | OUTPATIENT
Start: 2021-09-16 | End: 2021-09-16 | Stop reason: HOSPADM

## 2021-09-16 RX ORDER — HYDROCODONE BITARTRATE AND ACETAMINOPHEN 10; 325 MG/1; MG/1
1 TABLET ORAL EVERY 12 HOURS PRN
Qty: 10 TABLET | Refills: 0 | Status: SHIPPED | OUTPATIENT
Start: 2021-09-16 | End: 2021-10-16

## 2021-09-16 RX ORDER — FENTANYL CITRATE 50 UG/ML
INJECTION, SOLUTION INTRAMUSCULAR; INTRAVENOUS
Status: DISCONTINUED | OUTPATIENT
Start: 2021-09-16 | End: 2021-09-16 | Stop reason: HOSPADM

## 2021-09-16 RX ORDER — METHYLPREDNISOLONE ACETATE 40 MG/ML
INJECTION, SUSPENSION INTRA-ARTICULAR; INTRALESIONAL; INTRAMUSCULAR; SOFT TISSUE
Status: DISCONTINUED | OUTPATIENT
Start: 2021-09-16 | End: 2021-09-16 | Stop reason: HOSPADM

## 2021-09-17 VITALS
HEART RATE: 74 BPM | RESPIRATION RATE: 18 BRPM | BODY MASS INDEX: 27.62 KG/M2 | WEIGHT: 176 LBS | DIASTOLIC BLOOD PRESSURE: 68 MMHG | TEMPERATURE: 98 F | SYSTOLIC BLOOD PRESSURE: 138 MMHG | HEIGHT: 67 IN | OXYGEN SATURATION: 98 %

## 2021-10-15 ENCOUNTER — OFFICE VISIT (OUTPATIENT)
Dept: PAIN MEDICINE | Facility: CLINIC | Age: 51
End: 2021-10-15
Payer: OTHER GOVERNMENT

## 2021-10-15 VITALS
HEART RATE: 75 BPM | WEIGHT: 178.13 LBS | TEMPERATURE: 98 F | BODY MASS INDEX: 27.9 KG/M2 | RESPIRATION RATE: 18 BRPM | DIASTOLIC BLOOD PRESSURE: 72 MMHG | SYSTOLIC BLOOD PRESSURE: 153 MMHG | OXYGEN SATURATION: 100 %

## 2021-10-15 DIAGNOSIS — M54.16 LUMBAR RADICULOPATHY: ICD-10-CM

## 2021-10-15 DIAGNOSIS — M50.30 DDD (DEGENERATIVE DISC DISEASE), CERVICAL: ICD-10-CM

## 2021-10-15 DIAGNOSIS — M54.12 CERVICAL RADICULOPATHY: ICD-10-CM

## 2021-10-15 DIAGNOSIS — M47.816 LUMBAR SPONDYLOSIS: ICD-10-CM

## 2021-10-15 DIAGNOSIS — M51.36 DDD (DEGENERATIVE DISC DISEASE), LUMBAR: Primary | ICD-10-CM

## 2021-10-15 PROCEDURE — 99999 PR PBB SHADOW E&M-EST. PATIENT-LVL IV: ICD-10-PCS | Mod: PBBFAC,,, | Performed by: PHYSICIAN ASSISTANT

## 2021-10-15 PROCEDURE — 99213 OFFICE O/P EST LOW 20 MIN: CPT | Mod: S$PBB,,, | Performed by: PHYSICIAN ASSISTANT

## 2021-10-15 PROCEDURE — 99214 OFFICE O/P EST MOD 30 MIN: CPT | Mod: PBBFAC,PN | Performed by: PHYSICIAN ASSISTANT

## 2021-10-15 PROCEDURE — 99999 PR PBB SHADOW E&M-EST. PATIENT-LVL IV: CPT | Mod: PBBFAC,,, | Performed by: PHYSICIAN ASSISTANT

## 2021-10-15 PROCEDURE — 99213 PR OFFICE/OUTPT VISIT, EST, LEVL III, 20-29 MIN: ICD-10-PCS | Mod: S$PBB,,, | Performed by: PHYSICIAN ASSISTANT

## 2021-10-15 RX ORDER — MELOXICAM 15 MG/1
15 TABLET ORAL DAILY PRN
Qty: 30 TABLET | Refills: 2 | Status: SHIPPED | OUTPATIENT
Start: 2021-10-15 | End: 2021-12-16

## 2021-12-16 ENCOUNTER — OFFICE VISIT (OUTPATIENT)
Dept: PAIN MEDICINE | Facility: CLINIC | Age: 51
End: 2021-12-16
Payer: OTHER GOVERNMENT

## 2021-12-16 VITALS
TEMPERATURE: 99 F | HEART RATE: 78 BPM | RESPIRATION RATE: 18 BRPM | DIASTOLIC BLOOD PRESSURE: 74 MMHG | BODY MASS INDEX: 27.71 KG/M2 | WEIGHT: 176.94 LBS | SYSTOLIC BLOOD PRESSURE: 128 MMHG

## 2021-12-16 DIAGNOSIS — M50.30 DDD (DEGENERATIVE DISC DISEASE), CERVICAL: ICD-10-CM

## 2021-12-16 DIAGNOSIS — M51.36 DDD (DEGENERATIVE DISC DISEASE), LUMBAR: ICD-10-CM

## 2021-12-16 DIAGNOSIS — M54.16 LUMBAR RADICULOPATHY: ICD-10-CM

## 2021-12-16 DIAGNOSIS — M51.34 DDD (DEGENERATIVE DISC DISEASE), THORACIC: Primary | ICD-10-CM

## 2021-12-16 PROCEDURE — 99213 OFFICE O/P EST LOW 20 MIN: CPT | Mod: S$PBB,,, | Performed by: PHYSICIAN ASSISTANT

## 2021-12-16 PROCEDURE — 99213 PR OFFICE/OUTPT VISIT, EST, LEVL III, 20-29 MIN: ICD-10-PCS | Mod: S$PBB,,, | Performed by: PHYSICIAN ASSISTANT

## 2021-12-16 PROCEDURE — 99999 PR PBB SHADOW E&M-EST. PATIENT-LVL IV: ICD-10-PCS | Mod: PBBFAC,,, | Performed by: PHYSICIAN ASSISTANT

## 2021-12-16 PROCEDURE — 99999 PR PBB SHADOW E&M-EST. PATIENT-LVL IV: CPT | Mod: PBBFAC,,, | Performed by: PHYSICIAN ASSISTANT

## 2021-12-16 PROCEDURE — 99214 OFFICE O/P EST MOD 30 MIN: CPT | Mod: PBBFAC,PN | Performed by: PHYSICIAN ASSISTANT

## 2021-12-16 RX ORDER — METHOCARBAMOL 750 MG/1
750 TABLET, FILM COATED ORAL 2 TIMES DAILY PRN
Qty: 180 TABLET | Refills: 1 | Status: SHIPPED | OUTPATIENT
Start: 2021-12-16 | End: 2023-07-20 | Stop reason: CLARIF

## 2022-01-03 ENCOUNTER — TELEPHONE (OUTPATIENT)
Dept: PAIN MEDICINE | Facility: CLINIC | Age: 52
End: 2022-01-03
Payer: OTHER GOVERNMENT

## 2022-01-03 NOTE — TELEPHONE ENCOUNTER
Spoke with patient and reviewed T spine MRI, small disc bulge at T8/9.  He may call to set up YARA in Feb or March.

## 2023-05-16 DIAGNOSIS — M54.10 RADICULOPATHY: Primary | ICD-10-CM

## 2023-05-27 ENCOUNTER — HOSPITAL ENCOUNTER (OUTPATIENT)
Dept: RADIOLOGY | Facility: HOSPITAL | Age: 53
Discharge: HOME | End: 2023-05-27
Payer: OTHER GOVERNMENT

## 2023-05-27 DIAGNOSIS — M54.10 RADICULOPATHY: ICD-10-CM

## 2023-05-27 PROCEDURE — 72141 MRI CERVICAL SPINE WITHOUT CONTRAST: ICD-10-PCS | Mod: 26,,, | Performed by: RADIOLOGY

## 2023-05-27 PROCEDURE — 72141 MRI NECK SPINE W/O DYE: CPT | Mod: TC,PO

## 2023-05-27 PROCEDURE — 72148 MRI LUMBAR SPINE WITHOUT CONTRAST: ICD-10-PCS | Mod: 26,,, | Performed by: RADIOLOGY

## 2023-05-27 PROCEDURE — 72148 MRI LUMBAR SPINE W/O DYE: CPT | Mod: TC,PO

## 2023-05-27 PROCEDURE — 72141 MRI NECK SPINE W/O DYE: CPT | Mod: 26,,, | Performed by: RADIOLOGY

## 2023-05-27 PROCEDURE — 72148 MRI LUMBAR SPINE W/O DYE: CPT | Mod: 26,,, | Performed by: RADIOLOGY

## 2023-07-10 ENCOUNTER — OFFICE VISIT (OUTPATIENT)
Dept: PAIN MEDICINE | Facility: CLINIC | Age: 53
End: 2023-07-10
Payer: OTHER GOVERNMENT

## 2023-07-10 VITALS
SYSTOLIC BLOOD PRESSURE: 160 MMHG | HEIGHT: 67 IN | HEART RATE: 85 BPM | WEIGHT: 183 LBS | BODY MASS INDEX: 28.72 KG/M2 | DIASTOLIC BLOOD PRESSURE: 81 MMHG

## 2023-07-10 DIAGNOSIS — M51.34 DDD (DEGENERATIVE DISC DISEASE), THORACIC: ICD-10-CM

## 2023-07-10 DIAGNOSIS — M51.36 DDD (DEGENERATIVE DISC DISEASE), LUMBAR: ICD-10-CM

## 2023-07-10 DIAGNOSIS — M47.816 LUMBAR SPONDYLOSIS: ICD-10-CM

## 2023-07-10 DIAGNOSIS — M54.12 CERVICAL RADICULOPATHY: Primary | ICD-10-CM

## 2023-07-10 DIAGNOSIS — M50.30 DDD (DEGENERATIVE DISC DISEASE), CERVICAL: ICD-10-CM

## 2023-07-10 PROCEDURE — 99214 PR OFFICE/OUTPT VISIT, EST, LEVL IV, 30-39 MIN: ICD-10-PCS | Mod: S$PBB,,, | Performed by: PHYSICIAN ASSISTANT

## 2023-07-10 PROCEDURE — 99214 OFFICE O/P EST MOD 30 MIN: CPT | Mod: S$PBB,,, | Performed by: PHYSICIAN ASSISTANT

## 2023-07-10 PROCEDURE — 99215 OFFICE O/P EST HI 40 MIN: CPT | Mod: PBBFAC,PN | Performed by: PHYSICIAN ASSISTANT

## 2023-07-10 PROCEDURE — 99999 PR PBB SHADOW E&M-EST. PATIENT-LVL V: ICD-10-PCS | Mod: PBBFAC,,, | Performed by: PHYSICIAN ASSISTANT

## 2023-07-10 PROCEDURE — 99999 PR PBB SHADOW E&M-EST. PATIENT-LVL V: CPT | Mod: PBBFAC,,, | Performed by: PHYSICIAN ASSISTANT

## 2023-07-10 RX ORDER — SODIUM CHLORIDE, SODIUM LACTATE, POTASSIUM CHLORIDE, CALCIUM CHLORIDE 600; 310; 30; 20 MG/100ML; MG/100ML; MG/100ML; MG/100ML
INJECTION, SOLUTION INTRAVENOUS CONTINUOUS
Status: CANCELLED | OUTPATIENT
Start: 2023-07-10

## 2023-07-15 NOTE — PROGRESS NOTES
This note was completed with dictation software and grammatical errors may exist.    CC: Back pain, neck pain    HPI:  The patient is a 53-year-old man with a history of lumbar DDD status post L4/5 diskectomy who presents in referral from Dr. Gabriel Mo for increasing back pain.  He returns in follow-up today with neck pain and low back pain.  It has been about a year and half since his last visit.  For the last several months he has had worsening neck pain.  He describes pain in the right neck radiating along the right trapezius muscle to his shoulder.  This pain is sharp and intermittent.  He continues to have constant bilateral low back pain as states that he can not do normal activities without significant increasing pain.  He has to take medication and lay down to get any relief.  He denies new weakness or new numbness.    Previous history:  The patient reports being in  service over the years, numerous traumas and has had low back pain since 2002.  He has done numerous injections over the years, physical therapy but in 2007 developed severe left leg pain and left foot drop and underwent L4/5 diskectomy.  The pain improved and gradually his left foot drop improved.  Since that time he has had some low back pain and has gone through occasional injections, does exercises.  He has been taking tizanidine and Naprosyn with some relief, generally takes hydrocodone one to 2 times a week.  However, since November he has begun to have severe pain in the low back into the left buttock but also into the right testicle.  He has some chronic numbness and tingling along the left lateral lower leg.  He denies any new numbness.  He has been going to physical therapy recently at [facility].  He denies any bowel or bladder incontinence.    Pain intervention history:  He has had numerous injections with some relief in the past. He is status post bilateral L4/5 and L5/S1 facet joint injections on 02/01/2019 with  greater than 50% relief.  He is status post medial branch blocks bilateral L3, 4, 5 on 03/26/2019 with excellent relief, followed by bilateral L3, 4, 5 RFA on 04/09/2019 with greater than 80% relief for about a year.  He is status post bilateral L4/5 and L5/S1 medial branch radiofrequency ablation on 09/16/2021 with 0% relief.      Spine surgeries: He is s/p L4-5 microdiscectomy in 2007 and reports a residual left foot drop.  He has had an ACDF at C5 through C7.    Antineuropathics:He has tried Neurontin and Lyrica with side effects and no benefit.  NSAIDs:  Naprosyn  Physical therapy:  He has recently completed greater than 6 weeks of physical therapy at [facility] with only slight relief.  Antidepressants:  Muscle relaxers:  Tizanidine with slight relief  Opioids:  Generally takes hydrocodone 1 to 2 times a week, in 2021 he has been having to take twice daily due to the pain in his back.  Antiplatelets/Anticoagulants:      ROS:  He reports abdominal pain, joint stiffness, back pain and difficulty sleeping.  Balance of review of systems is negative.    Past Medical History:   Diagnosis Date    Arthritis     C. difficile diarrhea     Eosinophilic esophagitis     HLD (hyperlipidemia)     HTN (hypertension)     Irritable bowel syndrome with both constipation and diarrhea 8/25/2020    S/P cervical spinal fusion 8/25/2020    2016     Sleep apnea        Past Surgical History:   Procedure Laterality Date    ANTERIOR CERVICAL DISCECTOMY W/ FUSION  2016    dr banegas    BACK SURGERY  2007    lumbar discectomy L4/l5    COLONOSCOPY N/A 9/10/2018    Procedure: COLONOSCOPY;  Surgeon: Gonzalo Andrea Jr., MD;  Location: [facility] ENDO;  Service: Endoscopy;  Laterality: N/A;    EYE SURGERY Bilateral 2002    lasik    INJECTION OF ANESTHETIC AGENT AROUND MEDIAL BRANCH NERVES INNERVATING LUMBAR FACET JOINT Bilateral 3/26/2019    Procedure: Block-nerve-medial branch-lumbar L3,4,5;  Surgeon: Trip Pollack MD;  Location: [facility] OR;   Service: Pain Management;  Laterality: Bilateral;    INJECTION OF FACET JOINT Bilateral 2/1/2019    Procedure: INJECTION, FACET JOINT L4/5 and L5/S1;  Surgeon: Trip Pollack MD;  Location: [facility] OR;  Service: Pain Management;  Laterality: Bilateral;    JOINT REPLACEMENT      NASAL SEPTOPLASTY N/A 3/6/2020    Procedure: SEPTOPLASTY;  Surgeon: Faraz Pastor MD;  Location: [facility] OR;  Service: ENT;  Laterality: N/A;    NASAL TURBINATE REDUCTION Bilateral 3/6/2020    Procedure: REDUCTION-TURBINATE;  Surgeon: Faraz Pastor MD;  Location: [facility] OR;  Service: ENT;  Laterality: Bilateral;    RADIOFREQUENCY ABLATION OF LUMBAR MEDIAL BRANCH NERVE AT SINGLE LEVEL Bilateral 4/9/2019    Procedure: Radiofrequency Ablation, Nerve, Spinal, Lumbar, Medial Branch L3,4,5;  Surgeon: Trip Pollack MD;  Location: [facility] OR;  Service: Pain Management;  Laterality: Bilateral;    RADIOFREQUENCY ABLATION OF LUMBAR MEDIAL BRANCH NERVE AT SINGLE LEVEL Bilateral 9/16/2021    Procedure: Radiofrequency Ablation, Nerve, Spinal, Lumbar, Medial Branch, L4/5, L5/S1;  Surgeon: Trip Pollack MD;  Location: [facility] OR;  Service: Pain Management;  Laterality: Bilateral;    SHOULDER SURGERY Right 1993    right posterior capsulorraphy    TRANSFORAMINAL EPIDURAL INJECTION OF STEROID Bilateral 5/21/2021    Procedure: Injection,steroid,epidural,transforaminal approach L4/5;  Surgeon: Trip Pollack MD;  Location: [facility] OR;  Service: Pain Management;  Laterality: Bilateral;    UPPER GASTROINTESTINAL ENDOSCOPY  06/17/2013    [facility]- sent for scanning: stenosis s/p dilation, biopsied GEJ, gastritis, antritis (hard to read handwriting); biospy: antrum mild reactive gastropathy, negative h pylori; GEJ: active esophagitis with increased intraepithelial eosinophils; gastric type mucosa with chronic and active inflammation, no kwan's (no proximal esophagus biopsy done)    UPPER GASTROINTESTINAL ENDOSCOPY  08/31/2017    Dr. Andrea  "      Social History     Socioeconomic History    Marital status:    Tobacco Use    Smoking status: Never    Smokeless tobacco: Never   Substance and Sexual Activity    Alcohol use: Yes     Alcohol/week: 2.0 - 3.0 standard drinks     Types: 2 - 3 Cans of beer per week     Comment: weekends    Drug use: No   Social History Narrative    Occupation: [employer]         Medications/Allergies: See med card    Vitals:    07/10/23 1500   BP: (!) 160/81   Pulse: 85   Weight: 83 kg (182 lb 15.7 oz)   Height: 5' 7" (1.702 m)   PainSc:   7   PainLoc: Back         Physical exam:  Gen: A and O x3, pleasant, well-groomed  Skin: No rashes or obvious lesions  HEENT: PERRLA, no obvious deformities on ears or in canals. Trachea midline.  CVS: Regular rate and rhythm, normal palpable pulses.  Resp:No increased work of breathing, symmetrical chest rise.  Abdomen: Soft, NT/ND.  Musculoskeletal:  No antalgic gait.     Neuro:  Upper extremities: 5/5 strength bilaterally   Lower extremities: 5/5 strength bilaterally except for left foot 4/5 dorsiflexion  Reflexes:Brachioradialis 1+, Bicep 1+, Tricep 1+.  Patellar 2+, Achilles 1+ right side, 0+ left side.  Sensory:  Intact and symmetrical to light touch and pinprick in C2-T1 dermatomes bilaterally. Intact and symmetrical to light touch and pinprick in L2-S1 dermatomes bilaterally, except for decreased sensation to light touch along the left lateral lower leg.    Cervical Spine:  Cervical spine: ROM is mildly reduced with flexion, extension and lateral rotation with increased right neck and right trapezius muscle pain during left lateral rotation and extension.  Spurling's maneuver is deferred.  Myofascial exam:  Moderate tenderness to palpation along the right trapezius muscle.    Lumbar spine:  Lumbar spine:  Range of motion moderately reduced with both flexion and extension with increased pain especially on extension.  Brennan's test is deferred.  Supine straight leg raise causes severe " pain bilaterally.  Internal and external rotation of the hip is deferred.  Myofascial exam: No tenderness to palpation across lumbar paraspinous muscles.    Imaging:  MRI lumbar spine 12/13/2018:  T12/L1 through L2/3:  No significant disc bulging, foraminal narrowing.  At L3/4 there is mild disc bulging and facet arthropathy, mild neural foraminal narrowing.  At L4/5 there is posterior disc bulge with annular fissure slightly more to the right causing right lateral recess narrowing.  There is bilateral facet arthropathy and mild foraminal narrowing bilaterally.  At L5/S1 there is mild circumferential disc bulge with central protrusion, mild facet hypertrophy greater on the right.  No canal narrowing, there is mild neural foraminal narrowing left greater than right.    5/15/21 MRI L-spine:  T12-L1: No disc herniation or significant posterior osteophytic ridging. No significant spinal canal or foraminal stenosis.   L1-L2: No disc herniation or significant posterior osteophytic ridging.  Minimal bilateral facet hypertrophy.  No significant spinal canal or foraminal stenosis.   L2-L3: Minimal disc bulge.  Minimal bilateral facet hypertrophy.  Minimal narrowing of the bilateral lateral recesses.  No significant spinal canal stenosis.  Minimal right foraminal stenosis.   L3-L4: Mild disc bulge.  Mild right and minimal left facet hypertrophy.  Ligamentum flavum thickening.  Mild right and minimal left narrowing of the lateral recesses.  No significant spinal canal stenosis.  Minimal right foraminal stenosis.   L4-L5: Mild disc bulge. Disc height loss. Mild left and minimal right facet hypertrophy.  Mild narrowing of the bilateral lateral recesses. No significant spinal canal stenosis.  Mild bilateral foraminal stenosis.   L5-S1: Tiny left lateral recess protrusion.  Mild right facet hypertrophy.  Minimal narrowing of the left lateral recess.  No significant spinal canal or foraminal stenosis.     5/15/21 MRI  C-spine:  C2-C3: No disc herniation or significant posterior osseous ridging. No significant spinal canal or foraminal stenosis.   C3-C4: Minor bilateral uncovertebral spurring.  Minimal left facet hypertrophy.  Preserved ventral and dorsal CSF surrounding the cord.  No significant foraminal stenosis.   C4-C5: Mild disc osteophyte complex.  Ligamentum flavum thickening.  Mild bilateral facet hypertrophy.  Mild ventral cord flattening with preserved ventral and dorsal CSF surrounding the cord.  Moderate bilateral foraminal stenosis.   C5-C6: Fused.  Preserved ventral and dorsal CSF.  No significant foraminal stenosis.   C6-C7: Fused.  Preserved ventral and dorsal CSF.  No significant foraminal stenosis.   C7-T1: No disc herniation or significant posterior osseous ridging. No significant spinal canal or foraminal stenosis.    05/27/2023 MRI cervical spine  Morphology: There is prominent regional artifact from the patient's previous C5-C7 ACDF.  Marrow signal is grossly within normal limits and vertebral body heights appear to be preserved.     Alignment: Cervical spinal alignment is normal.     Cord: The cord is partially obscured at the C5-C6 level on the sagittal views.  No discrete signal abnormality.  No cord compression identified..     Craniocervical Junction: Cerebellar tonsils are normally positioned. The visualized portions of the posterior fossa are unremarkable. The regional osseous anatomy is normal.        Disc levels:        C2-C3: Mild left-sided facet arthrosis.  The spinal canal and foramina remain patent..     C3-C4: Mild left-sided facet arthrosis minimally narrowing the left foramen.  The spinal canal and right foramen are patent..     C4-C5: Moderate degenerative disc height loss and desiccation slightly progressed from the prior examination.  Shallow broad-based disc osteophyte complex contributes to no more than mild narrowing of the spinal canal.  Shallow uncovertebral spurring and mild  bilateral facet arthrosis contributes to mild to moderate left and moderate right foraminal narrowing also slightly progressed from the prior exam..     C5-C6: Limited imaging demonstrates decompression of the spinal canal and foramina..     C6-C7: Limited imaging demonstrates decompression of the spinal canal and foramina..     C7-T1: Within normal limits.  The spinal canal and foramina are patent..     Soft tissues: The visualized paraspinal soft tissues are within normal limits.    05/27/2023 MRI lumbar spine   Somewhat motion limited exam.     Morphology: Small Schmorl's node is redemonstrated along the inferior endplate of L4 with minimal associated reactive edema stable from the prior examination.  No new marrow edema or focal lesion.  Vertebral body heights are preserved.  No fractures..  Vertebral body heights are preserved.     Alignment: Within normal limits.     Cord: Normal in contour, caliber, and signal intensity.  Conus positioning is within normal limits.     Disc levels:     T12-L1: Mild degenerative disc height loss and desiccation.  Otherwise within normal limits aside from mild left-sided facet arthrosis.  The spinal canal and foramina are patent.     L1-L2: Within normal limits.  The spinal canal and foramina are patent.     L2-L3: Mild degenerative disc height loss and desiccation with shallow disc bulging but no disc herniation.  Mild encroachment of the subarticular recesses.  The spinal canal and foramina remain patent.     L3-L4: Mild degenerative disc height loss and desiccation with shallow disc bulging and mild bilateral facet arthrosis mildly encroaching the subarticular recesses without substantial narrowing of the spinal canal or foramina.     L4-L5: Similar moderate degenerative disc height loss and desiccation with shallow disc bulging slightly lateralizing to the right with an associated annular fissure with asymmetric moderate crutch mint of the right greater than left  subarticular recesses and minimal right central spinal canal narrowing.  Mild bilateral facet arthrosis contributes to mild left foraminal narrowing.  The right foramen is patent.     L5-S1: Redemonstrated mild to moderate degenerative disc height loss and desiccation similar to the prior examination.  No disc herniation.  Mild right-sided facet arthrosis.  No significant foraminal narrowing.     Other: Partially imaged simple appearing right renal cyst although incompletely characterized measuring at least 3.3 cm as seen on prior CT 06/26/2018.    Assessment:  The patient is a 53-year-old man with a history of lumbar DDD status post L4/5 diskectomy who presents in referral from Dr. Gabriel Mo for increasing back pain.    1. Cervical radiculopathy  Place in Outpatient    Vital signs    Place 18-22 gauage peripheral IV     Verify informed consent    Notify physician     Notify physician     Notify physician (specify)    Diet NPO    lactated ringers infusion    Case Request Operating Room: Injection-steroid-epidural-cervical C7/T1      2. DDD (degenerative disc disease), cervical        3. DDD (degenerative disc disease), lumbar        4. DDD (degenerative disc disease), thoracic        5. Lumbar spondylosis              Plan:  1. I reviewed his cervical spine MRI with him we discussed that his current pain is likely due to C4/5 where he has foraminal stenosis.  I will schedule him for a C7-T1 interlaminar epidural steroid injection to the right.    2. I reviewed his lumbar spine MRI with him and we discussed his degenerative changes including the Modic endplate changes he has at L4/5.  He may be a candidate for Intracept in the future.  He is going to follow-up with Dr. Mo to discuss options as well.    3. He will follow-up here 4 weeks postprocedure or sooner as needed.

## 2023-07-25 PROBLEM — M54.12 CERVICAL RADICULOPATHY: Status: ACTIVE | Noted: 2023-07-25

## 2023-08-10 ENCOUNTER — OFFICE VISIT (OUTPATIENT)
Dept: NEUROSURGERY | Facility: CLINIC | Age: 53
End: 2023-08-10
Payer: OTHER GOVERNMENT

## 2023-08-10 VITALS
HEIGHT: 67 IN | DIASTOLIC BLOOD PRESSURE: 86 MMHG | SYSTOLIC BLOOD PRESSURE: 140 MMHG | HEART RATE: 88 BPM | WEIGHT: 180.75 LBS | RESPIRATION RATE: 18 BRPM | BODY MASS INDEX: 28.37 KG/M2

## 2023-08-10 DIAGNOSIS — Z98.890 H/O LUMBAR DISCECTOMY: Primary | ICD-10-CM

## 2023-08-10 DIAGNOSIS — Z98.1 S/P CERVICAL SPINAL FUSION: ICD-10-CM

## 2023-08-10 DIAGNOSIS — M50.10 CERVICAL DISC DISORDER WITH RADICULOPATHY: ICD-10-CM

## 2023-08-10 PROCEDURE — 99204 OFFICE O/P NEW MOD 45 MIN: CPT | Mod: S$PBB,,, | Performed by: STUDENT IN AN ORGANIZED HEALTH CARE EDUCATION/TRAINING PROGRAM

## 2023-08-10 PROCEDURE — 99204 PR OFFICE/OUTPT VISIT, NEW, LEVL IV, 45-59 MIN: ICD-10-PCS | Mod: S$PBB,,, | Performed by: STUDENT IN AN ORGANIZED HEALTH CARE EDUCATION/TRAINING PROGRAM

## 2023-08-10 NOTE — PROGRESS NOTES
[facility] Neurosurgery [facility]  Clinic Consult     Consult Requested By: Jayde Fleming MD, No ref. provider found        SUBJECTIVE:     Chief Complaint:   Chief Complaint   Patient presents with    Follow-up     YARA follow up.  States YARA worked for about 3 days.  Pain slowly coming back.  Pain the the R sided neck, shoulder.         History of Present Illness:  Brennan Bruner is a 53 y.o. male who was last evaluated for lower back pain in 2018.  History of lumbar  decompression.  He continues to struggle with back pain.  Had a large acreage 4.  Which he longer manage secondary to symptoms.  He continues to work pain management.  A repeat with feeling without lumbar stenosis or evidence of malalignment.  In addition to this he is a history a C5-7 ACDF.  He continues progressive neck and right proximal the.  Radiate feel the shoulder.  The certain maneuvers including insulin.  This a high frequency and severity were weakness he has made non operatively steroid injection very transiently relief.  He had a repeat cervical MRI in addition shows degenerative disc disease at the adjacent segment at C4-5 with loss of disc height progress since his previous MI bilateral foraminal stenosis.  There is no cord compression  There is artifact at the implant site, which wasn't previous but a different magnet and most c/w artifact    No reg flags    Pertinent and Recent history, provider evaluations, imaging and data reviewed in EPIC             Diagnostic Results:  I have independently reviewed the following imaging:  Impression:     1. Operative changes at C5-C7 anteriorly producing regional artifact.  No acute process identified.  2. Progressive adjacent segment degenerative changes at C4-C5 with slightly progressed disc height loss contributing to moderate right and mild-to-moderate left foraminal narrowing.  Similar mild narrowing of the spinal canal.  No cord impingement.  3. Additional  milder degenerative changes as discussed above.  No new disc herniation.        Electronically signed by: Stephen Friend  Date:                                            05/27/2023  Time:                                           11:43    Impression:     1. Overall no significant change or acute process compared to previous MRI 05/15/2021.  2. Degenerative changes most pronounced at L4-L5 with moderate disc height loss and shallow disc bulging with an associated annular fissure mildly encroaching both subarticular recesses, right greater than left.  No more than mild narrowing of the spinal canal and left foramen.  3. Additional level by level details discussed above.  No new disc herniation.        Electronically signed by: Stephen Friend  Date:                                            05/27/2023  Time:                                           13:09    Review of patient's allergies indicates:   Allergen Reactions    Banana     Sildenafil Other (See Comments)       Past Medical History:   Diagnosis Date    Arthritis     C. difficile diarrhea     Eosinophilic esophagitis     HLD (hyperlipidemia)     HTN (hypertension)     Irritable bowel syndrome with both constipation and diarrhea 08/25/2020    S/P cervical spinal fusion 08/25/2020 2016     Sleep apnea      Past Surgical History:   Procedure Laterality Date    ANTERIOR CERVICAL DISCECTOMY W/ FUSION  2016    dr banegas    BACK SURGERY  2007    lumbar discectomy L4/l5    COLONOSCOPY N/A 09/10/2018    Procedure: COLONOSCOPY;  Surgeon: Gonzalo Andrea Jr., MD;  Location: [facility] ENDO;  Service: Endoscopy;  Laterality: N/A;    EPIDURAL STEROID INJECTION INTO CERVICAL SPINE Right 7/25/2023    Procedure: Injection-steroid-epidural-cervical C7/T1;  Surgeon: Trip Pollack MD;  Location: [facility];  Service: Pain Management;  Laterality: Right;    EYE SURGERY Bilateral 2002    lasik    INJECTION OF ANESTHETIC AGENT AROUND MEDIAL BRANCH NERVES INNERVATING LUMBAR FACET  JOINT Bilateral 03/26/2019    Procedure: Block-nerve-medial branch-lumbar L3,4,5;  Surgeon: Trip Pollack MD;  Location: [facility] OR;  Service: Pain Management;  Laterality: Bilateral;    INJECTION OF FACET JOINT Bilateral 02/01/2019    Procedure: INJECTION, FACET JOINT L4/5 and L5/S1;  Surgeon: Trip Pollack MD;  Location: [facility] OR;  Service: Pain Management;  Laterality: Bilateral;    NASAL SEPTOPLASTY N/A 03/06/2020    Procedure: SEPTOPLASTY;  Surgeon: Faraz Pastor MD;  Location: [facility] OR;  Service: ENT;  Laterality: N/A;    NASAL TURBINATE REDUCTION Bilateral 03/06/2020    Procedure: REDUCTION-TURBINATE;  Surgeon: Faraz Pastor MD;  Location: [facility] OR;  Service: ENT;  Laterality: Bilateral;    RADIOFREQUENCY ABLATION OF LUMBAR MEDIAL BRANCH NERVE AT SINGLE LEVEL Bilateral 04/09/2019    Procedure: Radiofrequency Ablation, Nerve, Spinal, Lumbar, Medial Branch L3,4,5;  Surgeon: Trip Pollack MD;  Location: [facility] OR;  Service: Pain Management;  Laterality: Bilateral;    RADIOFREQUENCY ABLATION OF LUMBAR MEDIAL BRANCH NERVE AT SINGLE LEVEL Bilateral 09/16/2021    Procedure: Radiofrequency Ablation, Nerve, Spinal, Lumbar, Medial Branch, L4/5, L5/S1;  Surgeon: Trip Pollack MD;  Location: [facility] OR;  Service: Pain Management;  Laterality: Bilateral;    SHOULDER SURGERY Right 1993    right posterior capsulorraphy    TRANSFORAMINAL EPIDURAL INJECTION OF STEROID Bilateral 05/21/2021    Procedure: Injection,steroid,epidural,transforaminal approach L4/5;  Surgeon: Trip Pollack MD;  Location: [facility] OR;  Service: Pain Management;  Laterality: Bilateral;    UPPER GASTROINTESTINAL ENDOSCOPY  06/17/2013    Gladys- sent for scanning: stenosis s/p dilation, biopsied GEJ, gastritis, antritis (hard to read handwriting); biospy: antrum mild reactive gastropathy, negative h pylori; GEJ: active esophagitis with increased intraepithelial eosinophils; gastric type mucosa with chronic and active  inflammation, no kwan's (no proximal esophagus biopsy done)    UPPER GASTROINTESTINAL ENDOSCOPY  08/31/2017    Dr. Andrea     Family History   Problem Relation Age of Onset    Migraines Mother     Hyperlipidemia Sister     Hypertension Maternal Grandmother     Heart attack Maternal Grandmother     Hypertension Maternal Grandfather     Heart attack Maternal Grandfather     Heart attack Paternal Grandmother     Hypertension Paternal Grandfather     Heart attack Paternal Grandfather     Colon cancer Neg Hx     Colon polyps Neg Hx     Crohn's disease Neg Hx     Ulcerative colitis Neg Hx     Stomach cancer Neg Hx     Esophageal cancer Neg Hx      Social History     Tobacco Use    Smoking status: Never    Smokeless tobacco: Never   Substance Use Topics    Alcohol use: Yes     Alcohol/week: 2.0 - 3.0 standard drinks of alcohol     Types: 2 - 3 Cans of beer per week     Comment: weekends    Drug use: No        OBJECTIVE:     Vital Signs (Most Recent):  Pulse: 88 (08/10/23 1313)  Resp: 18 (08/10/23 1313)  BP: (!) 140/86 (08/10/23 1313)    Physical Exam:      General: well developed, well nourished, no distress. .  Mental Status: Awake, Alert, Oriented x 4  Language: No aphasia  Speech: No dysarthria  Head: normocephalic, atraumatic.  Neck: trachea midline, no JVD   Cardiovascular: no LE edema  Pulmonary: normal respirations, no signs of respiratory distress  Abdomen: soft, non-distended    Motor Strength:  No abnormal movements seen.     Strength  Deltoids Triceps Biceps Wrist Extension Wrist Flexion Hand  Interossei     Upper: R 5/5 5/5 5/5 5/5 5/5 5/5 5/5      L 5/5 5/5 5/5 5/5 5/5 5/5 5/5       Iliopsoas Quadriceps Knee  Flexion Tibialis  anterior Gastro- cnemius EHL  Foot Eversion Foot inversion   Lower: R 5/5 5/5 5/5 5/5 5/5 5/5 5/5 5/5 5/5    L 5/5 5/5 5/5 5/5 5/5 5/5 5/5 5/5 5/5     SILT,PP    + spurling on right   Pain with rom    Fry: absent      Gait: normal                 ASSESSMENT/PLAN:     H/O lumbar  discectomy  -     X-Ray Lumbar Spine Ap Lateral w/Flex Ext; Future; Expected date: 08/10/2023    S/P cervical spinal fusion  -     X-Ray Cervical Spine AP Lat with Flexion  Extension; Future; Expected date: 08/10/2023    Cervical disc disorder with radiculopathy        [age] year old gentleman with a history of a 5-7 ACDF maximal adjacent segment C4-5.  Disc degeneration loss of height which is symptomatic he has a radiculopathy on the right. Motor intact but highly symptomatic. Transient improvement with YARA and has F/U with Dr. Pollack. Did well and considering if option for another.  We reviewed treatment options in detail persistent cervical radiculopathy with adjacent segment disease.  Surgery require a CT scan, ENT scope had a previous right-sided incision is clean would go from the left and a C4-5 anterior cervical diskectomy and fusion.  Feels like he like to continue his conservative management reviewed additional physical therapy though he has been through this as a treatment without success.  He is follow up with Dr. Pollack.    I he continues to have lower back pain his MRI looks stable with his history of decompression lumbar spondylosis and moderate degenerative disc without severe loss of height at L4-5.  An additional obtain new cervical and lumbar dynamic x-rays to evaluate alignment upright evaluation and assure no listhesis.  He will let us know I would like to proceed but currently he plans to managed with nonoperative care a pain management should new symptoms arise or persist let us know.          Gabriel Mo MD  Neurosurgery

## 2023-08-28 ENCOUNTER — OFFICE VISIT (OUTPATIENT)
Dept: PAIN MEDICINE | Facility: CLINIC | Age: 53
End: 2023-08-28
Payer: OTHER GOVERNMENT

## 2023-08-28 VITALS
WEIGHT: 185.75 LBS | SYSTOLIC BLOOD PRESSURE: 138 MMHG | HEART RATE: 81 BPM | DIASTOLIC BLOOD PRESSURE: 88 MMHG | HEIGHT: 67 IN | BODY MASS INDEX: 29.15 KG/M2

## 2023-08-28 DIAGNOSIS — M47.812 CERVICAL SPONDYLOSIS: ICD-10-CM

## 2023-08-28 DIAGNOSIS — M54.12 CERVICAL RADICULOPATHY: Primary | ICD-10-CM

## 2023-08-28 DIAGNOSIS — M54.89 VERTEBROGENIC PAIN: ICD-10-CM

## 2023-08-28 DIAGNOSIS — M51.36 DDD (DEGENERATIVE DISC DISEASE), LUMBAR: ICD-10-CM

## 2023-08-28 PROCEDURE — 99999 PR PBB SHADOW E&M-EST. PATIENT-LVL III: ICD-10-PCS | Mod: PBBFAC,,, | Performed by: ANESTHESIOLOGY

## 2023-08-28 PROCEDURE — 99214 OFFICE O/P EST MOD 30 MIN: CPT | Mod: S$PBB,,, | Performed by: ANESTHESIOLOGY

## 2023-08-28 PROCEDURE — 99999 PR PBB SHADOW E&M-EST. PATIENT-LVL III: CPT | Mod: PBBFAC,,, | Performed by: ANESTHESIOLOGY

## 2023-08-28 PROCEDURE — 99214 PR OFFICE/OUTPT VISIT, EST, LEVL IV, 30-39 MIN: ICD-10-PCS | Mod: S$PBB,,, | Performed by: ANESTHESIOLOGY

## 2023-08-28 PROCEDURE — 99213 OFFICE O/P EST LOW 20 MIN: CPT | Mod: PBBFAC,PN | Performed by: ANESTHESIOLOGY

## 2023-08-28 NOTE — PROGRESS NOTES
This note was completed with dictation software and grammatical errors may exist.    CC: Back pain, neck pain    HPI:  The patient is a 53-year-old man with a history of lumbar DDD status post L4/5 diskectomy who presents in referral from Dr. Gabriel Mo for increasing back pain.  He returns in follow-up today for back pain and neck pain.  He is status post C7/T1 YARA on 07/25/2023 with almost 100% relief for 3 days, still had some benefit but gradually over a month it completely wore off.  He does feel that it definitely helped at 1st and had some sustained relief but less effective but now he is back to having severe pain in the midline neck and into the right shoulder blade, right upper arm.  The pain is worse with sitting too long, driving putting his arms out in front of him, working.  Denies any jalil weakness, is not dropping anything.  Since I would last seen him he followed up with Neurosurgery and they discussed possible surgery which unfortunately would be somewhat complicated and he would have to see ENT prior to any surgery.    His other complaint is low back pain, he would discussed possible surgery for his low back as well.  He does not have pain radiating into the legs, primarily stays in the low back, is particularly worse with sitting too long, standing too long and with bending.          Previous history:  The patient reports being in  service over the years, numerous traumas and has had low back pain since 2002.  He has done numerous injections over the years, physical therapy but in 2007 developed severe left leg pain and left foot drop and underwent L4/5 diskectomy.  The pain improved and gradually his left foot drop improved.  Since that time he has had some low back pain and has gone through occasional injections, does exercises.  He has been taking tizanidine and Naprosyn with some relief, generally takes hydrocodone one to 2 times a week.  However, since November he has begun to have  severe pain in the low back into the left buttock but also into the right testicle.  He has some chronic numbness and tingling along the left lateral lower leg.  He denies any new numbness.  He has been going to physical therapy recently at [facility].  He denies any bowel or bladder incontinence.    Pain intervention history:  He has had numerous injections with some relief in the past. He is status post bilateral L4/5 and L5/S1 facet joint injections on 02/01/2019 with greater than 50% relief.  He is status post medial branch blocks bilateral L3, 4, 5 on 03/26/2019 with excellent relief, followed by bilateral L3, 4, 5 RFA on 04/09/2019 with greater than 80% relief for about a year.  He is status post bilateral L4/5 and L5/S1 medial branch radiofrequency ablation on 09/16/2021 with 0% relief.  He is status post C7/T1 YARA on 07/25/2023 with almost 100% relief for 3 days, still had some benefit but gradually over a month it completely wore off.     Spine surgeries: He is s/p L4-5 microdiscectomy in 2007 and reports a residual left foot drop.  He has had an ACDF at C5 through C7.    Antineuropathics:He has tried Neurontin and Lyrica with side effects and no benefit.  NSAIDs:  Naprosyn  Physical therapy:  He has recently completed greater than 6 weeks of physical therapy at [facility] with only slight relief.  Antidepressants:  Muscle relaxers:  Tizanidine with slight relief  Opioids:  Generally takes hydrocodone 1 to 2 times a week, in 2021 he has been having to take twice daily due to the pain in his back.  Antiplatelets/Anticoagulants:      ROS:  He reports abdominal pain, joint stiffness, back pain and difficulty sleeping.  Balance of review of systems is negative.    Past Medical History:   Diagnosis Date    Arthritis     C. difficile diarrhea     Eosinophilic esophagitis     HLD (hyperlipidemia)     HTN (hypertension)     Irritable bowel syndrome with both constipation and diarrhea 08/25/2020    S/P cervical  spinal fusion 08/25/2020 2016     Sleep apnea        Past Surgical History:   Procedure Laterality Date    ANTERIOR CERVICAL DISCECTOMY W/ FUSION  2016    dr banegas    BACK SURGERY  2007    lumbar discectomy L4/l5    COLONOSCOPY N/A 09/10/2018    Procedure: COLONOSCOPY;  Surgeon: Gonzalo Andrea Jr., MD;  Location: [facility] ENDO;  Service: Endoscopy;  Laterality: N/A;    EPIDURAL STEROID INJECTION INTO CERVICAL SPINE Right 7/25/2023    Procedure: Injection-steroid-epidural-cervical C7/T1;  Surgeon: Trip Pollack MD;  Location: [facility];  Service: Pain Management;  Laterality: Right;    EYE SURGERY Bilateral 2002    lasik    INJECTION OF ANESTHETIC AGENT AROUND MEDIAL BRANCH NERVES INNERVATING LUMBAR FACET JOINT Bilateral 03/26/2019    Procedure: Block-nerve-medial branch-lumbar L3,4,5;  Surgeon: Trip Pollack MD;  Location: [facility] OR;  Service: Pain Management;  Laterality: Bilateral;    INJECTION OF FACET JOINT Bilateral 02/01/2019    Procedure: INJECTION, FACET JOINT L4/5 and L5/S1;  Surgeon: Trip Pollack MD;  Location: [facility] OR;  Service: Pain Management;  Laterality: Bilateral;    NASAL SEPTOPLASTY N/A 03/06/2020    Procedure: SEPTOPLASTY;  Surgeon: Faraz Pastor MD;  Location: [facility] OR;  Service: ENT;  Laterality: N/A;    NASAL TURBINATE REDUCTION Bilateral 03/06/2020    Procedure: REDUCTION-TURBINATE;  Surgeon: Faraz Pastor MD;  Location: [facility] OR;  Service: ENT;  Laterality: Bilateral;    RADIOFREQUENCY ABLATION OF LUMBAR MEDIAL BRANCH NERVE AT SINGLE LEVEL Bilateral 04/09/2019    Procedure: Radiofrequency Ablation, Nerve, Spinal, Lumbar, Medial Branch L3,4,5;  Surgeon: Trip Pollack MD;  Location: [facility] OR;  Service: Pain Management;  Laterality: Bilateral;    RADIOFREQUENCY ABLATION OF LUMBAR MEDIAL BRANCH NERVE AT SINGLE LEVEL Bilateral 09/16/2021    Procedure: Radiofrequency Ablation, Nerve, Spinal, Lumbar, Medial Branch, L4/5, L5/S1;  Surgeon: Trip Pollack MD;   "Location: [facility] OR;  Service: Pain Management;  Laterality: Bilateral;    SHOULDER SURGERY Right 1993    right posterior capsulorraphy    TRANSFORAMINAL EPIDURAL INJECTION OF STEROID Bilateral 05/21/2021    Procedure: Injection,steroid,epidural,transforaminal approach L4/5;  Surgeon: Trip Pollack MD;  Location: [facility] OR;  Service: Pain Management;  Laterality: Bilateral;    UPPER GASTROINTESTINAL ENDOSCOPY  06/17/2013    [facility]- sent for scanning: stenosis s/p dilation, biopsied GEJ, gastritis, antritis (hard to read handwriting); biospy: antrum mild reactive gastropathy, negative h pylori; GEJ: active esophagitis with increased intraepithelial eosinophils; gastric type mucosa with chronic and active inflammation, no kwan's (no proximal esophagus biopsy done)    UPPER GASTROINTESTINAL ENDOSCOPY  08/31/2017    Dr. Andrea       Social History     Socioeconomic History    Marital status:    Tobacco Use    Smoking status: Never    Smokeless tobacco: Never   Substance and Sexual Activity    Alcohol use: Yes     Alcohol/week: 2.0 - 3.0 standard drinks of alcohol     Types: 2 - 3 Cans of beer per week     Comment: weekends    Drug use: No   Social History Narrative    Occupation: [employer]         Medications/Allergies: See med card    Vitals:    08/28/23 1013   BP: 138/88   Pulse: 81   Weight: 84.2 kg (185 lb 11.8 oz)   Height: 5' 7" (1.702 m)   PainSc:   6   PainLoc: Neck         Physical exam:  Gen: A and O x3, pleasant, well-groomed  Skin: No rashes or obvious lesions  HEENT: PERRLA, no obvious deformities on ears or in canals. Trachea midline.  CVS: Regular rate and rhythm, normal palpable pulses.  Resp:No increased work of breathing, symmetrical chest rise.  Abdomen: Soft, NT/ND.  Musculoskeletal:  No antalgic gait.     Neuro:  Upper extremities: 5/5 strength bilaterally   Lower extremities: 5/5 strength bilaterally except for left foot 4/5 dorsiflexion  Reflexes:Brachioradialis 1+, Bicep 1+, Tricep " 1+.  Patellar 2+, Achilles 1+ right side, 0+ left side.  Sensory:  Intact and symmetrical to light touch and pinprick in C2-T1 dermatomes bilaterally. Intact and symmetrical to light touch and pinprick in L2-S1 dermatomes bilaterally, except for decreased sensation to light touch along the left lateral lower leg.    Cervical Spine:  Cervical spine: ROM is mildly reduced with flexion, extension and lateral rotation with increased right neck and right trapezius muscle pain during left lateral rotation and extension.  Spurling's maneuver is deferred.  Myofascial exam:  Moderate tenderness to palpation along the right trapezius muscle.    Lumbar spine:  Lumbar spine:  Range of motion moderately reduced with both flexion and extension with severe increased pain with flexion.  Brennan's test is deferred.  Supine straight leg raise causes severe pain back pain bilaterally.    Internal and external rotation of the hip is deferred.  Myofascial exam: No tenderness to palpation across lumbar paraspinous muscles.    Imaging:  MRI lumbar spine 12/13/2018:  T12/L1 through L2/3:  No significant disc bulging, foraminal narrowing.  At L3/4 there is mild disc bulging and facet arthropathy, mild neural foraminal narrowing.  At L4/5 there is posterior disc bulge with annular fissure slightly more to the right causing right lateral recess narrowing.  There is bilateral facet arthropathy and mild foraminal narrowing bilaterally.  At L5/S1 there is mild circumferential disc bulge with central protrusion, mild facet hypertrophy greater on the right.  No canal narrowing, there is mild neural foraminal narrowing left greater than right.    5/15/21 MRI L-spine:  T12-L1: No disc herniation or significant posterior osteophytic ridging. No significant spinal canal or foraminal stenosis.   L1-L2: No disc herniation or significant posterior osteophytic ridging.  Minimal bilateral facet hypertrophy.  No significant spinal canal or foraminal  stenosis.   L2-L3: Minimal disc bulge.  Minimal bilateral facet hypertrophy.  Minimal narrowing of the bilateral lateral recesses.  No significant spinal canal stenosis.  Minimal right foraminal stenosis.   L3-L4: Mild disc bulge.  Mild right and minimal left facet hypertrophy.  Ligamentum flavum thickening.  Mild right and minimal left narrowing of the lateral recesses.  No significant spinal canal stenosis.  Minimal right foraminal stenosis.   L4-L5: Mild disc bulge. Disc height loss. Mild left and minimal right facet hypertrophy.  Mild narrowing of the bilateral lateral recesses. No significant spinal canal stenosis.  Mild bilateral foraminal stenosis.   L5-S1: Tiny left lateral recess protrusion.  Mild right facet hypertrophy.  Minimal narrowing of the left lateral recess.  No significant spinal canal or foraminal stenosis.     5/15/21 MRI C-spine:  C2-C3: No disc herniation or significant posterior osseous ridging. No significant spinal canal or foraminal stenosis.   C3-C4: Minor bilateral uncovertebral spurring.  Minimal left facet hypertrophy.  Preserved ventral and dorsal CSF surrounding the cord.  No significant foraminal stenosis.   C4-C5: Mild disc osteophyte complex.  Ligamentum flavum thickening.  Mild bilateral facet hypertrophy.  Mild ventral cord flattening with preserved ventral and dorsal CSF surrounding the cord.  Moderate bilateral foraminal stenosis.   C5-C6: Fused.  Preserved ventral and dorsal CSF.  No significant foraminal stenosis.   C6-C7: Fused.  Preserved ventral and dorsal CSF.  No significant foraminal stenosis.   C7-T1: No disc herniation or significant posterior osseous ridging. No significant spinal canal or foraminal stenosis.    05/27/2023 MRI cervical spine  Morphology: There is prominent regional artifact from the patient's previous C5-C7 ACDF.  Marrow signal is grossly within normal limits and vertebral body heights appear to be preserved.   Alignment: Cervical spinal alignment  is normal.   Cord: The cord is partially obscured at the C5-C6 level on the sagittal views.  No discrete signal abnormality.  No cord compression identified..   Craniocervical Junction: Cerebellar tonsils are normally positioned. The visualized portions of the posterior fossa are unremarkable. The regional osseous anatomy is normal.    Disc levels:   C2-C3: Mild left-sided facet arthrosis.  The spinal canal and foramina remain patent..   C3-C4: Mild left-sided facet arthrosis minimally narrowing the left foramen.  The spinal canal and right foramen are patent..   C4-C5: Moderate degenerative disc height loss and desiccation slightly progressed from the prior examination.  Shallow broad-based disc osteophyte complex contributes to no more than mild narrowing of the spinal canal.  Shallow uncovertebral spurring and mild bilateral facet arthrosis contributes to mild to moderate left and moderate right foraminal narrowing also slightly progressed from the prior exam..   C5-C6: Limited imaging demonstrates decompression of the spinal canal and foramina..   C6-C7: Limited imaging demonstrates decompression of the spinal canal and foramina..   C7-T1: Within normal limits.  The spinal canal and foramina are patent..     Soft tissues: The visualized paraspinal soft tissues are within normal limits.    05/27/2023 MRI lumbar spine   Special attention to the L4-L5 level demonstrates Modic type 1 endplate changes along the inferior endplate of L4 within associated small Schmorl's node similar to the prior exam, also Modic type 1 endplate changes at the superior surface of L5.   Morphology: Small Schmorl's node is redemonstrated along the inferior endplate of L4 with minimal associated reactive edema stable from the prior examination.  No new marrow edema or focal lesion.  Vertebral body heights are preserved.  No fractures..  Vertebral body heights are preserved.   Alignment: Within normal limits.   Cord: Normal in contour,  caliber, and signal intensity.  Conus positioning is within normal limits.   Disc levels:   T12-L1: Mild degenerative disc height loss and desiccation.  Otherwise within normal limits aside from mild left-sided facet arthrosis.  The spinal canal and foramina are patent.   L1-L2: Within normal limits.  The spinal canal and foramina are patent.   L2-3: Mild degenerative disc height loss and desiccation with shallow disc bulging but no disc herniation.  Mild encroachment of the subarticular recesses.  The spinal canal and foramina remain patent.   L3-L4: Mild degenerative disc height loss and desiccation with shallow disc bulging and mild bilateral facet arthrosis mildly encroaching the subarticular recesses without substantial narrowing of the spinal canal or foramina.   L4-L5: Similar moderate degenerative disc height loss and desiccation with shallow disc bulging slightly lateralizing to the right with an associated annular fissure with asymmetric moderate crutch mint of the right greater than left subarticular recesses and minimal right central spinal canal narrowing.  Mild bilateral facet arthrosis contributes to mild left foraminal narrowing.  The right foramen is patent.   L5-S1: Redemonstrated mild to moderate degenerative disc height loss and desiccation similar to the prior examination.  No disc herniation.  Mild right-sided facet arthrosis.  No significant foraminal narrowing.   Other: Partially imaged simple appearing right renal cyst although incompletely characterized measuring at least 3.3 cm as seen on prior CT 06/26/2018.         Assessment:  The patient is a 53-year-old man with a history of lumbar DDD status post L4/5 diskectomy who presents in referral from Dr. Gabriel Mo for increasing back pain.    1. Cervical radiculopathy        2. Cervical spondylosis        3. Vertebrogenic pain        4. DDD (degenerative disc disease), lumbar                Plan:  1. We reviewed his cervical spine MRI again,  does have foraminal narrowing out to the right side at C4/5 likely causing his symptoms.  We are going to type 1 more epidural steroid injection to see if we can get relief since he would like to avoid surgery if possible.  He has discussed surgery with Dr. Park and he would require an ENT workup 1st.  2.  In terms of his low back pain, he has also discussed possible surgery but he would like to avoid this.  He does have Modic endplate changes specifically at L4 and L5 and we discussed possible basivertebral nerve ablation and he would like to see if this could be done.  I would set him up for basivertebral nerve ablation nerve ablation at L4 and L5, we will work on getting approval for this.

## 2023-08-28 NOTE — H&P (VIEW-ONLY)
This note was completed with dictation software and grammatical errors may exist.    CC: Back pain, neck pain    HPI:  The patient is a 53-year-old man with a history of lumbar DDD status post L4/5 diskectomy who presents in referral from Dr. Gabriel Mo for increasing back pain.  He returns in follow-up today for back pain and neck pain.  He is status post C7/T1 YARA on 07/25/2023 with almost 100% relief for 3 days, still had some benefit but gradually over a month it completely wore off.  He does feel that it definitely helped at 1st and had some sustained relief but less effective but now he is back to having severe pain in the midline neck and into the right shoulder blade, right upper arm.  The pain is worse with sitting too long, driving putting his arms out in front of him, working.  Denies any jalil weakness, is not dropping anything.  Since I would last seen him he followed up with Neurosurgery and they discussed possible surgery which unfortunately would be somewhat complicated and he would have to see ENT prior to any surgery.    His other complaint is low back pain, he would discussed possible surgery for his low back as well.  He does not have pain radiating into the legs, primarily stays in the low back, is particularly worse with sitting too long, standing too long and with bending.          Previous history:  The patient reports being in  service over the years, numerous traumas and has had low back pain since 2002.  He has done numerous injections over the years, physical therapy but in 2007 developed severe left leg pain and left foot drop and underwent L4/5 diskectomy.  The pain improved and gradually his left foot drop improved.  Since that time he has had some low back pain and has gone through occasional injections, does exercises.  He has been taking tizanidine and Naprosyn with some relief, generally takes hydrocodone one to 2 times a week.  However, since November he has begun to have  severe pain in the low back into the left buttock but also into the right testicle.  He has some chronic numbness and tingling along the left lateral lower leg.  He denies any new numbness.  He has been going to physical therapy recently at [facility].  He denies any bowel or bladder incontinence.    Pain intervention history:  He has had numerous injections with some relief in the past. He is status post bilateral L4/5 and L5/S1 facet joint injections on 02/01/2019 with greater than 50% relief.  He is status post medial branch blocks bilateral L3, 4, 5 on 03/26/2019 with excellent relief, followed by bilateral L3, 4, 5 RFA on 04/09/2019 with greater than 80% relief for about a year.  He is status post bilateral L4/5 and L5/S1 medial branch radiofrequency ablation on 09/16/2021 with 0% relief.  He is status post C7/T1 YARA on 07/25/2023 with almost 100% relief for 3 days, still had some benefit but gradually over a month it completely wore off.     Spine surgeries: He is s/p L4-5 microdiscectomy in 2007 and reports a residual left foot drop.  He has had an ACDF at C5 through C7.    Antineuropathics:He has tried Neurontin and Lyrica with side effects and no benefit.  NSAIDs:  Naprosyn  Physical therapy:  He has recently completed greater than 6 weeks of physical therapy at [facility] with only slight relief.  Antidepressants:  Muscle relaxers:  Tizanidine with slight relief  Opioids:  Generally takes hydrocodone 1 to 2 times a week, in 2021 he has been having to take twice daily due to the pain in his back.  Antiplatelets/Anticoagulants:      ROS:  He reports abdominal pain, joint stiffness, back pain and difficulty sleeping.  Balance of review of systems is negative.    Past Medical History:   Diagnosis Date    Arthritis     C. difficile diarrhea     Eosinophilic esophagitis     HLD (hyperlipidemia)     HTN (hypertension)     Irritable bowel syndrome with both constipation and diarrhea 08/25/2020    S/P cervical  spinal fusion 08/25/2020 2016     Sleep apnea        Past Surgical History:   Procedure Laterality Date    ANTERIOR CERVICAL DISCECTOMY W/ FUSION  2016    dr banegas    BACK SURGERY  2007    lumbar discectomy L4/l5    COLONOSCOPY N/A 09/10/2018    Procedure: COLONOSCOPY;  Surgeon: Gonzalo Andrea Jr., MD;  Location: [facility] ENDO;  Service: Endoscopy;  Laterality: N/A;    EPIDURAL STEROID INJECTION INTO CERVICAL SPINE Right 7/25/2023    Procedure: Injection-steroid-epidural-cervical C7/T1;  Surgeon: Trip Pollack MD;  Location: [facility];  Service: Pain Management;  Laterality: Right;    EYE SURGERY Bilateral 2002    lasik    INJECTION OF ANESTHETIC AGENT AROUND MEDIAL BRANCH NERVES INNERVATING LUMBAR FACET JOINT Bilateral 03/26/2019    Procedure: Block-nerve-medial branch-lumbar L3,4,5;  Surgeon: Trip Pollack MD;  Location: [facility] OR;  Service: Pain Management;  Laterality: Bilateral;    INJECTION OF FACET JOINT Bilateral 02/01/2019    Procedure: INJECTION, FACET JOINT L4/5 and L5/S1;  Surgeon: Trip Pollack MD;  Location: [facility] OR;  Service: Pain Management;  Laterality: Bilateral;    NASAL SEPTOPLASTY N/A 03/06/2020    Procedure: SEPTOPLASTY;  Surgeon: Faraz Pastor MD;  Location: [facility] OR;  Service: ENT;  Laterality: N/A;    NASAL TURBINATE REDUCTION Bilateral 03/06/2020    Procedure: REDUCTION-TURBINATE;  Surgeon: Faraz Pastor MD;  Location: [facility] OR;  Service: ENT;  Laterality: Bilateral;    RADIOFREQUENCY ABLATION OF LUMBAR MEDIAL BRANCH NERVE AT SINGLE LEVEL Bilateral 04/09/2019    Procedure: Radiofrequency Ablation, Nerve, Spinal, Lumbar, Medial Branch L3,4,5;  Surgeon: Trip Pollack MD;  Location: [facility] OR;  Service: Pain Management;  Laterality: Bilateral;    RADIOFREQUENCY ABLATION OF LUMBAR MEDIAL BRANCH NERVE AT SINGLE LEVEL Bilateral 09/16/2021    Procedure: Radiofrequency Ablation, Nerve, Spinal, Lumbar, Medial Branch, L4/5, L5/S1;  Surgeon: Trip Pollack MD;   "Location: [facility] OR;  Service: Pain Management;  Laterality: Bilateral;    SHOULDER SURGERY Right 1993    right posterior capsulorraphy    TRANSFORAMINAL EPIDURAL INJECTION OF STEROID Bilateral 05/21/2021    Procedure: Injection,steroid,epidural,transforaminal approach L4/5;  Surgeon: Trip Pollack MD;  Location: [facility] OR;  Service: Pain Management;  Laterality: Bilateral;    UPPER GASTROINTESTINAL ENDOSCOPY  06/17/2013    [place]- sent for scanning: stenosis s/p dilation, biopsied GEJ, gastritis, antritis (hard to read handwriting); biospy: antrum mild reactive gastropathy, negative h pylori; GEJ: active esophagitis with increased intraepithelial eosinophils; gastric type mucosa with chronic and active inflammation, no kwan's (no proximal esophagus biopsy done)    UPPER GASTROINTESTINAL ENDOSCOPY  08/31/2017    Dr. Andrea       Social History     Socioeconomic History    Marital status:    Tobacco Use    Smoking status: Never    Smokeless tobacco: Never   Substance and Sexual Activity    Alcohol use: Yes     Alcohol/week: 2.0 - 3.0 standard drinks of alcohol     Types: 2 - 3 Cans of beer per week     Comment: weekends    Drug use: No   Social History Narrative    Occupation: [employer]         Medications/Allergies: See med card    Vitals:    08/28/23 1013   BP: 138/88   Pulse: 81   Weight: 84.2 kg (185 lb 11.8 oz)   Height: 5' 7" (1.702 m)   PainSc:   6   PainLoc: Neck         Physical exam:  Gen: A and O x3, pleasant, well-groomed  Skin: No rashes or obvious lesions  HEENT: PERRLA, no obvious deformities on ears or in canals. Trachea midline.  CVS: Regular rate and rhythm, normal palpable pulses.  Resp:No increased work of breathing, symmetrical chest rise.  Abdomen: Soft, NT/ND.  Musculoskeletal:  No antalgic gait.     Neuro:  Upper extremities: 5/5 strength bilaterally   Lower extremities: 5/5 strength bilaterally except for left foot 4/5 dorsiflexion  Reflexes:Brachioradialis 1+, Bicep 1+, Tricep " 1+.  Patellar 2+, Achilles 1+ right side, 0+ left side.  Sensory:  Intact and symmetrical to light touch and pinprick in C2-T1 dermatomes bilaterally. Intact and symmetrical to light touch and pinprick in L2-S1 dermatomes bilaterally, except for decreased sensation to light touch along the left lateral lower leg.    Cervical Spine:  Cervical spine: ROM is mildly reduced with flexion, extension and lateral rotation with increased right neck and right trapezius muscle pain during left lateral rotation and extension.  Spurling's maneuver is deferred.  Myofascial exam:  Moderate tenderness to palpation along the right trapezius muscle.    Lumbar spine:  Lumbar spine:  Range of motion moderately reduced with both flexion and extension with severe increased pain with flexion.  Brennan's test is deferred.  Supine straight leg raise causes severe pain back pain bilaterally.    Internal and external rotation of the hip is deferred.  Myofascial exam: No tenderness to palpation across lumbar paraspinous muscles.    Imaging:  MRI lumbar spine 12/13/2018:  T12/L1 through L2/3:  No significant disc bulging, foraminal narrowing.  At L3/4 there is mild disc bulging and facet arthropathy, mild neural foraminal narrowing.  At L4/5 there is posterior disc bulge with annular fissure slightly more to the right causing right lateral recess narrowing.  There is bilateral facet arthropathy and mild foraminal narrowing bilaterally.  At L5/S1 there is mild circumferential disc bulge with central protrusion, mild facet hypertrophy greater on the right.  No canal narrowing, there is mild neural foraminal narrowing left greater than right.    5/15/21 MRI L-spine:  T12-L1: No disc herniation or significant posterior osteophytic ridging. No significant spinal canal or foraminal stenosis.   L1-L2: No disc herniation or significant posterior osteophytic ridging.  Minimal bilateral facet hypertrophy.  No significant spinal canal or foraminal  stenosis.   L2-L3: Minimal disc bulge.  Minimal bilateral facet hypertrophy.  Minimal narrowing of the bilateral lateral recesses.  No significant spinal canal stenosis.  Minimal right foraminal stenosis.   L3-L4: Mild disc bulge.  Mild right and minimal left facet hypertrophy.  Ligamentum flavum thickening.  Mild right and minimal left narrowing of the lateral recesses.  No significant spinal canal stenosis.  Minimal right foraminal stenosis.   L4-L5: Mild disc bulge. Disc height loss. Mild left and minimal right facet hypertrophy.  Mild narrowing of the bilateral lateral recesses. No significant spinal canal stenosis.  Mild bilateral foraminal stenosis.   L5-S1: Tiny left lateral recess protrusion.  Mild right facet hypertrophy.  Minimal narrowing of the left lateral recess.  No significant spinal canal or foraminal stenosis.     5/15/21 MRI C-spine:  C2-C3: No disc herniation or significant posterior osseous ridging. No significant spinal canal or foraminal stenosis.   C3-C4: Minor bilateral uncovertebral spurring.  Minimal left facet hypertrophy.  Preserved ventral and dorsal CSF surrounding the cord.  No significant foraminal stenosis.   C4-C5: Mild disc osteophyte complex.  Ligamentum flavum thickening.  Mild bilateral facet hypertrophy.  Mild ventral cord flattening with preserved ventral and dorsal CSF surrounding the cord.  Moderate bilateral foraminal stenosis.   C5-C6: Fused.  Preserved ventral and dorsal CSF.  No significant foraminal stenosis.   C6-C7: Fused.  Preserved ventral and dorsal CSF.  No significant foraminal stenosis.   C7-T1: No disc herniation or significant posterior osseous ridging. No significant spinal canal or foraminal stenosis.    05/27/2023 MRI cervical spine  Morphology: There is prominent regional artifact from the patient's previous C5-C7 ACDF.  Marrow signal is grossly within normal limits and vertebral body heights appear to be preserved.   Alignment: Cervical spinal alignment  is normal.   Cord: The cord is partially obscured at the C5-C6 level on the sagittal views.  No discrete signal abnormality.  No cord compression identified..   Craniocervical Junction: Cerebellar tonsils are normally positioned. The visualized portions of the posterior fossa are unremarkable. The regional osseous anatomy is normal.    Disc levels:   C2-C3: Mild left-sided facet arthrosis.  The spinal canal and foramina remain patent..   C3-C4: Mild left-sided facet arthrosis minimally narrowing the left foramen.  The spinal canal and right foramen are patent..   C4-C5: Moderate degenerative disc height loss and desiccation slightly progressed from the prior examination.  Shallow broad-based disc osteophyte complex contributes to no more than mild narrowing of the spinal canal.  Shallow uncovertebral spurring and mild bilateral facet arthrosis contributes to mild to moderate left and moderate right foraminal narrowing also slightly progressed from the prior exam..   C5-C6: Limited imaging demonstrates decompression of the spinal canal and foramina..   C6-C7: Limited imaging demonstrates decompression of the spinal canal and foramina..   C7-T1: Within normal limits.  The spinal canal and foramina are patent..     Soft tissues: The visualized paraspinal soft tissues are within normal limits.    05/27/2023 MRI lumbar spine   Special attention to the L4-L5 level demonstrates Modic type 1 endplate changes along the inferior endplate of L4 within associated small Schmorl's node similar to the prior exam, also Modic type 1 endplate changes at the superior surface of L5.   Morphology: Small Schmorl's node is redemonstrated along the inferior endplate of L4 with minimal associated reactive edema stable from the prior examination.  No new marrow edema or focal lesion.  Vertebral body heights are preserved.  No fractures..  Vertebral body heights are preserved.   Alignment: Within normal limits.   Cord: Normal in contour,  caliber, and signal intensity.  Conus positioning is within normal limits.   Disc levels:   T12-L1: Mild degenerative disc height loss and desiccation.  Otherwise within normal limits aside from mild left-sided facet arthrosis.  The spinal canal and foramina are patent.   L1-L2: Within normal limits.  The spinal canal and foramina are patent.   L2-3: Mild degenerative disc height loss and desiccation with shallow disc bulging but no disc herniation.  Mild encroachment of the subarticular recesses.  The spinal canal and foramina remain patent.   L3-L4: Mild degenerative disc height loss and desiccation with shallow disc bulging and mild bilateral facet arthrosis mildly encroaching the subarticular recesses without substantial narrowing of the spinal canal or foramina.   L4-L5: Similar moderate degenerative disc height loss and desiccation with shallow disc bulging slightly lateralizing to the right with an associated annular fissure with asymmetric moderate crutch mint of the right greater than left subarticular recesses and minimal right central spinal canal narrowing.  Mild bilateral facet arthrosis contributes to mild left foraminal narrowing.  The right foramen is patent.   L5-S1: Redemonstrated mild to moderate degenerative disc height loss and desiccation similar to the prior examination.  No disc herniation.  Mild right-sided facet arthrosis.  No significant foraminal narrowing.   Other: Partially imaged simple appearing right renal cyst although incompletely characterized measuring at least 3.3 cm as seen on prior CT 06/26/2018.         Assessment:  The patient is a 53-year-old man with a history of lumbar DDD status post L4/5 diskectomy who presents in referral from Dr. Gabriel Mo for increasing back pain.    1. Cervical radiculopathy        2. Cervical spondylosis        3. Vertebrogenic pain        4. DDD (degenerative disc disease), lumbar                Plan:  1. We reviewed his cervical spine MRI again,  does have foraminal narrowing out to the right side at C4/5 likely causing his symptoms.  We are going to type 1 more epidural steroid injection to see if we can get relief since he would like to avoid surgery if possible.  He has discussed surgery with Dr. Park and he would require an ENT workup 1st.  2.  In terms of his low back pain, he has also discussed possible surgery but he would like to avoid this.  He does have Modic endplate changes specifically at L4 and L5 and we discussed possible basivertebral nerve ablation and he would like to see if this could be done.  I would set him up for basivertebral nerve ablation nerve ablation at L4 and L5, we will work on getting approval for this.

## 2023-08-29 RX ORDER — SODIUM CHLORIDE, SODIUM LACTATE, POTASSIUM CHLORIDE, CALCIUM CHLORIDE 600; 310; 30; 20 MG/100ML; MG/100ML; MG/100ML; MG/100ML
INJECTION, SOLUTION INTRAVENOUS CONTINUOUS
Status: CANCELLED | OUTPATIENT
Start: 2023-08-29

## 2023-08-30 ENCOUNTER — TELEPHONE (OUTPATIENT)
Dept: PAIN MEDICINE | Facility: CLINIC | Age: 53
End: 2023-08-30
Payer: OTHER GOVERNMENT

## 2023-09-08 RX ORDER — VALSARTAN 320 MG/1
320 TABLET ORAL DAILY
COMMUNITY
End: 2024-01-03 | Stop reason: SDUPTHER

## 2023-09-13 ENCOUNTER — HOSPITAL ENCOUNTER (OUTPATIENT)
Facility: HOSPITAL | Age: 53
Discharge: HOME | End: 2023-09-13
Attending: ANESTHESIOLOGY | Admitting: ANESTHESIOLOGY
Payer: OTHER GOVERNMENT

## 2023-09-13 ENCOUNTER — HOSPITAL ENCOUNTER (OUTPATIENT)
Dept: RADIOLOGY | Facility: HOSPITAL | Age: 53
Discharge: HOME | End: 2023-09-13
Attending: ANESTHESIOLOGY | Admitting: ANESTHESIOLOGY
Payer: OTHER GOVERNMENT

## 2023-09-13 DIAGNOSIS — M54.12 CERVICAL RADICULOPATHY: ICD-10-CM

## 2023-09-13 DIAGNOSIS — M54.2 NECK PAIN: ICD-10-CM

## 2023-09-13 PROCEDURE — 63600175 PHARM REV CODE 636 W HCPCS: Mod: PO | Performed by: ANESTHESIOLOGY

## 2023-09-13 PROCEDURE — 25500020 PHARM REV CODE 255: Mod: PO | Performed by: ANESTHESIOLOGY

## 2023-09-13 PROCEDURE — 62321 NJX INTERLAMINAR CRV/THRC: CPT | Mod: ,,, | Performed by: ANESTHESIOLOGY

## 2023-09-13 PROCEDURE — A4216 STERILE WATER/SALINE, 10 ML: HCPCS | Mod: PO | Performed by: ANESTHESIOLOGY

## 2023-09-13 PROCEDURE — 25000003 PHARM REV CODE 250: Mod: PO | Performed by: ANESTHESIOLOGY

## 2023-09-13 PROCEDURE — 62321 PR INJ CERV/THORAC, W/GUIDANCE: ICD-10-PCS | Mod: ,,, | Performed by: ANESTHESIOLOGY

## 2023-09-13 PROCEDURE — 76000 FLUOROSCOPY <1 HR PHYS/QHP: CPT | Mod: TC,PO

## 2023-09-13 PROCEDURE — 62321 NJX INTERLAMINAR CRV/THRC: CPT | Mod: PO | Performed by: ANESTHESIOLOGY

## 2023-09-13 RX ORDER — SODIUM CHLORIDE, SODIUM LACTATE, POTASSIUM CHLORIDE, CALCIUM CHLORIDE 600; 310; 30; 20 MG/100ML; MG/100ML; MG/100ML; MG/100ML
INJECTION, SOLUTION INTRAVENOUS CONTINUOUS
Status: DISCONTINUED | OUTPATIENT
Start: 2023-09-13 | End: 2023-09-13 | Stop reason: HOSPADM

## 2023-09-13 RX ORDER — SODIUM CHLORIDE 9 MG/ML
INJECTION, SOLUTION INTRAMUSCULAR; INTRAVENOUS; SUBCUTANEOUS
Status: DISCONTINUED | OUTPATIENT
Start: 2023-09-13 | End: 2023-09-13 | Stop reason: HOSPADM

## 2023-09-13 RX ORDER — MIDAZOLAM HYDROCHLORIDE 2 MG/2ML
INJECTION, SOLUTION INTRAMUSCULAR; INTRAVENOUS
Status: DISCONTINUED | OUTPATIENT
Start: 2023-09-13 | End: 2023-09-13 | Stop reason: HOSPADM

## 2023-09-13 RX ORDER — METHYLPREDNISOLONE ACETATE 80 MG/ML
INJECTION, SUSPENSION INTRA-ARTICULAR; INTRALESIONAL; INTRAMUSCULAR; SOFT TISSUE
Status: DISCONTINUED | OUTPATIENT
Start: 2023-09-13 | End: 2023-09-13 | Stop reason: HOSPADM

## 2023-09-13 RX ADMIN — SODIUM CHLORIDE, POTASSIUM CHLORIDE, SODIUM LACTATE AND CALCIUM CHLORIDE: 600; 310; 30; 20 INJECTION, SOLUTION INTRAVENOUS at 12:09

## 2023-09-13 NOTE — OP NOTE
PROCEDURE DATE: 9/13/2023    Procedure: C7-T1 cervical interlaminar epidural steroid injection under utilizing fluoroscopy.    Diagnosis: Cervical Radiculopathy    POSTOP DIAGNOSIS: SAME    Physician: Trip Pollack MD    Medications injected:  Methylprednisone 80mg followed by a slow injection of 4 mL sterile, preservative-free normal saline.    Local anesthetic used: Lidocaine 1%, 4 ml.    Sedation Medications: 4mg versed    Complications:  none    Estimated blood loss: none    Technique:  A time-out was taken to identify patient and procedure prior to starting the procedure.  With the patient laying in a prone position with the neck in a mid-flexed forward position, the area was prepped and draped in the usual sterile fashion using ChloraPrep and a fenestrated drape.  The area was determined under AP fluoroscopic guidance.  Local anesthetic was given using a 25-gauge 1.5 inch needle by raising a wheal and then infiltrating ventrally.  A 3.5 inch 20-gauge Touhy needle was introduced under fluoroscopic guidance to meet the lamina of C7.  The needle was then hinged under the lamina then advanced using loss of resistance technique.  Once the tip of the needle was in the desired position, the contrast dye Omnipaque was injected to determine placement and no uptake.  The steroid was then injected slowly followed by a slow injection of 4 mL of the sterile preservative-free normal saline.  The patient tolerated the procedure well.    The patient was monitored after the procedure and was given post-procedure and discharge instructions to follow at home. The patient was discharged in a stable condition.

## 2023-09-13 NOTE — DISCHARGE SUMMARY
[place] - Surgery  Discharge Note  Short Stay    Procedure(s) (LRB):  Injection-steroid-epidural-cervical C6/7 (N/A)      OUTCOME: Patient tolerated treatment/procedure well without complication and is now ready for discharge.    DISPOSITION: Home or Self Care    FINAL DIAGNOSIS:  Cervical radiculopathy    FOLLOWUP: In clinic    DISCHARGE INSTRUCTIONS:    Discharge Procedure Orders   Diet Adult Regular     No dressing needed     Notify your health care provider if you experience any of the following:  temperature >100.4     Activity as tolerated

## 2023-09-13 NOTE — DISCHARGE INSTRUCTIONS

## 2023-09-14 VITALS
TEMPERATURE: 97 F | BODY MASS INDEX: 29.03 KG/M2 | OXYGEN SATURATION: 95 % | HEIGHT: 67 IN | HEART RATE: 68 BPM | SYSTOLIC BLOOD PRESSURE: 119 MMHG | WEIGHT: 185 LBS | DIASTOLIC BLOOD PRESSURE: 72 MMHG | RESPIRATION RATE: 18 BRPM

## 2023-09-29 ENCOUNTER — TELEPHONE (OUTPATIENT)
Dept: PAIN MEDICINE | Facility: CLINIC | Age: 53
End: 2023-09-29
Payer: OTHER GOVERNMENT

## 2023-10-04 ENCOUNTER — TELEPHONE (OUTPATIENT)
Dept: PAIN MEDICINE | Facility: CLINIC | Age: 53
End: 2023-10-04

## 2023-10-04 RX ORDER — HYDROCODONE BITARTRATE AND ACETAMINOPHEN 10; 325 MG/1; MG/1
1 TABLET ORAL EVERY 6 HOURS PRN
Qty: 120 TABLET | Refills: 0 | Status: SHIPPED | OUTPATIENT
Start: 2023-10-04

## 2023-10-04 RX ORDER — LIDOCAINE 50 MG/G
1 PATCH TOPICAL DAILY
Qty: 30 PATCH | Refills: 0 | Status: SHIPPED | OUTPATIENT
Start: 2023-10-04

## 2023-10-05 ENCOUNTER — HOSPITAL ENCOUNTER (OUTPATIENT)
Dept: RADIOLOGY | Facility: HOSPITAL | Age: 53
Discharge: HOME | End: 2023-10-05
Attending: NEUROLOGICAL SURGERY
Payer: OTHER GOVERNMENT

## 2023-10-05 DIAGNOSIS — M43.06 LUMBAR SPONDYLOLYSIS: ICD-10-CM

## 2023-10-05 DIAGNOSIS — M54.12 CERVICAL RADICULOPATHY: ICD-10-CM

## 2023-10-05 PROCEDURE — 72131 CT LUMBAR SPINE WITHOUT CONTRAST: ICD-10-PCS | Mod: 26,,, | Performed by: RADIOLOGY

## 2023-10-05 PROCEDURE — 72125 CT NECK SPINE W/O DYE: CPT | Mod: TC,PO

## 2023-10-05 PROCEDURE — 72131 CT LUMBAR SPINE W/O DYE: CPT | Mod: 26,,, | Performed by: RADIOLOGY

## 2023-10-05 PROCEDURE — 72125 CT CERVICAL SPINE WITHOUT CONTRAST: ICD-10-PCS | Mod: 26,,, | Performed by: RADIOLOGY

## 2023-10-05 PROCEDURE — 72131 CT LUMBAR SPINE W/O DYE: CPT | Mod: TC,PO

## 2023-10-05 PROCEDURE — 72125 CT NECK SPINE W/O DYE: CPT | Mod: 26,,, | Performed by: RADIOLOGY

## 2023-10-16 ENCOUNTER — OFFICE VISIT (OUTPATIENT)
Dept: PAIN MEDICINE | Facility: CLINIC | Age: 53
End: 2023-10-16
Payer: OTHER GOVERNMENT

## 2023-10-16 VITALS
WEIGHT: 183.31 LBS | HEART RATE: 92 BPM | SYSTOLIC BLOOD PRESSURE: 149 MMHG | DIASTOLIC BLOOD PRESSURE: 88 MMHG | BODY MASS INDEX: 28.77 KG/M2 | HEIGHT: 67 IN

## 2023-10-16 DIAGNOSIS — M54.12 CERVICAL RADICULOPATHY: Primary | ICD-10-CM

## 2023-10-16 DIAGNOSIS — M51.36 DDD (DEGENERATIVE DISC DISEASE), LUMBAR: ICD-10-CM

## 2023-10-16 DIAGNOSIS — M51.34 DDD (DEGENERATIVE DISC DISEASE), THORACIC: ICD-10-CM

## 2023-10-16 DIAGNOSIS — M47.812 CERVICAL SPONDYLOSIS: ICD-10-CM

## 2023-10-16 DIAGNOSIS — M54.89 VERTEBROGENIC PAIN: ICD-10-CM

## 2023-10-16 DIAGNOSIS — M50.30 DDD (DEGENERATIVE DISC DISEASE), CERVICAL: ICD-10-CM

## 2023-10-16 PROCEDURE — 99999 PR PBB SHADOW E&M-EST. PATIENT-LVL IV: ICD-10-PCS | Mod: PBBFAC,,, | Performed by: PHYSICIAN ASSISTANT

## 2023-10-16 PROCEDURE — 99213 OFFICE O/P EST LOW 20 MIN: CPT | Mod: S$PBB,,, | Performed by: PHYSICIAN ASSISTANT

## 2023-10-16 PROCEDURE — 99213 PR OFFICE/OUTPT VISIT, EST, LEVL III, 20-29 MIN: ICD-10-PCS | Mod: S$PBB,,, | Performed by: PHYSICIAN ASSISTANT

## 2023-10-16 PROCEDURE — 99214 OFFICE O/P EST MOD 30 MIN: CPT | Mod: PBBFAC,PO | Performed by: PHYSICIAN ASSISTANT

## 2023-10-16 PROCEDURE — 99999 PR PBB SHADOW E&M-EST. PATIENT-LVL IV: CPT | Mod: PBBFAC,,, | Performed by: PHYSICIAN ASSISTANT

## 2023-10-22 NOTE — PROGRESS NOTES
This note was completed with dictation software and grammatical errors may exist.    CC: Back pain, neck pain    HPI:  The patient is a 53-year-old man with a history of lumbar DDD status post L4/5 diskectomy who presents in referral from Dr. Gabriel Mo for increasing back pain.  He is status post C6/7 interlaminar epidural steroid injection on 09/13/2023 with 0% relief.  He continues to have neck pain as well as tingling in his right upper arm and right forearm.  He has been going to physical therapy with some benefit.  He reports weakness in his right arm.  He does feel that his low back pain is a little better with gabapentin however his pain still limits his ability to perform activities of daily living without pain.  He is scheduled for cervical spine surgery with Dr. Mckenzie on 11/11/2023.    Previous history:  The patient reports being in  service over the years, numerous traumas and has had low back pain since 2002.  He has done numerous injections over the years, physical therapy but in 2007 developed severe left leg pain and left foot drop and underwent L4/5 diskectomy.  The pain improved and gradually his left foot drop improved.  Since that time he has had some low back pain and has gone through occasional injections, does exercises.  He has been taking tizanidine and Naprosyn with some relief, generally takes hydrocodone one to 2 times a week.  However, since November he has begun to have severe pain in the low back into the left buttock but also into the right testicle.  He has some chronic numbness and tingling along the left lateral lower leg.  He denies any new numbness.  He has been going to physical therapy recently at [facility].  He denies any bowel or bladder incontinence.    Pain intervention history:  He has had numerous injections with some relief in the past. He is status post bilateral L4/5 and L5/S1 facet joint injections on 02/01/2019 with greater than 50% relief.  He is  status post medial branch blocks bilateral L3, 4, 5 on 03/26/2019 with excellent relief, followed by bilateral L3, 4, 5 RFA on 04/09/2019 with greater than 80% relief for about a year.  He is status post bilateral L4/5 and L5/S1 medial branch radiofrequency ablation on 09/16/2021 with 0% relief.  He is status post C7/T1 YARA on 07/25/2023 with almost 100% relief for 3 days, still had some benefit but gradually over a month it completely wore off.  He is status post C6/7 interlaminar epidural steroid injection on 09/13/2023 with 0% relief.     Spine surgeries: He is s/p L4-5 microdiscectomy in 2007 and reports a residual left foot drop.  He has had an ACDF at C5 through C7.    Antineuropathics:He has tried Neurontin and Lyrica with side effects and no benefit.  NSAIDs:  Naprosyn  Physical therapy:  He has recently completed greater than 6 weeks of physical therapy at [facility] with only slight relief.  Antidepressants:  Muscle relaxers:  Tizanidine with slight relief  Opioids:  Generally takes hydrocodone 1 to 2 times a week, in 2021 he has been having to take twice daily due to the pain in his back.  Antiplatelets/Anticoagulants:      ROS:  He reports abdominal pain, joint stiffness, back pain and difficulty sleeping.  Balance of review of systems is negative.    Past Medical History:   Diagnosis Date    Arthritis     C. difficile diarrhea     Eosinophilic esophagitis     HLD (hyperlipidemia)     HTN (hypertension)     Irritable bowel syndrome with both constipation and diarrhea 08/25/2020    S/P cervical spinal fusion 08/25/2020    2016     Sleep apnea        Past Surgical History:   Procedure Laterality Date    ANTERIOR CERVICAL DISCECTOMY W/ FUSION  2016    dr banegas    BACK SURGERY  2007    lumbar discectomy L4/l5    COLONOSCOPY N/A 09/10/2018    Procedure: COLONOSCOPY;  Surgeon: Gonzalo Andrea Jr., MD;  Location: [facility];  Service: Endoscopy;  Laterality: N/A;    EPIDURAL STEROID INJECTION INTO CERVICAL  SPINE Right 7/25/2023    Procedure: Injection-steroid-epidural-cervical C7/T1;  Surgeon: Trip Pollack MD;  Location: [facility];  Service: Pain Management;  Laterality: Right;    EPIDURAL STEROID INJECTION INTO CERVICAL SPINE N/A 9/13/2023    Procedure: Injection-steroid-epidural-cervical C6/7;  Surgeon: Trip Pollack MD;  Location: [facility];  Service: Pain Management;  Laterality: N/A;    EYE SURGERY Bilateral 2002    lasik    INJECTION OF ANESTHETIC AGENT AROUND MEDIAL BRANCH NERVES INNERVATING LUMBAR FACET JOINT Bilateral 03/26/2019    Procedure: Block-nerve-medial branch-lumbar L3,4,5;  Surgeon: Trip Pollack MD;  Location: [facility] OR;  Service: Pain Management;  Laterality: Bilateral;    INJECTION OF FACET JOINT Bilateral 02/01/2019    Procedure: INJECTION, FACET JOINT L4/5 and L5/S1;  Surgeon: Trip Pollack MD;  Location: [facility];  Service: Pain Management;  Laterality: Bilateral;    NASAL SEPTOPLASTY N/A 03/06/2020    Procedure: SEPTOPLASTY;  Surgeon: Faraz Pastor MD;  Location: [facility] OR;  Service: ENT;  Laterality: N/A;    NASAL TURBINATE REDUCTION Bilateral 03/06/2020    Procedure: REDUCTION-TURBINATE;  Surgeon: Faraz Pastor MD;  Location: [facility] OR;  Service: ENT;  Laterality: Bilateral;    RADIOFREQUENCY ABLATION OF LUMBAR MEDIAL BRANCH NERVE AT SINGLE LEVEL Bilateral 04/09/2019    Procedure: Radiofrequency Ablation, Nerve, Spinal, Lumbar, Medial Branch L3,4,5;  Surgeon: Trip Pollack MD;  Location: [facility];  Service: Pain Management;  Laterality: Bilateral;    RADIOFREQUENCY ABLATION OF LUMBAR MEDIAL BRANCH NERVE AT SINGLE LEVEL Bilateral 09/16/2021    Procedure: Radiofrequency Ablation, Nerve, Spinal, Lumbar, Medial Branch, L4/5, L5/S1;  Surgeon: Trip Pollack MD;  Location: [facility];  Service: Pain Management;  Laterality: Bilateral;    SHOULDER SURGERY Right 1993    right posterior capsulorraphy    TRANSFORAMINAL EPIDURAL INJECTION OF STEROID Bilateral 05/21/2021  "   Procedure: Injection,steroid,epidural,transforaminal approach L4/5;  Surgeon: Trip Pollack MD;  Location: [facility] OR;  Service: Pain Management;  Laterality: Bilateral;    UPPER GASTROINTESTINAL ENDOSCOPY  06/17/2013    Vicky Thompson- sent for scanning: stenosis s/p dilation, biopsied GEJ, gastritis, antritis (hard to read handwriting); biospy: antrum mild reactive gastropathy, negative h pylori; GEJ: active esophagitis with increased intraepithelial eosinophils; gastric type mucosa with chronic and active inflammation, no kwan's (no proximal esophagus biopsy done)    UPPER GASTROINTESTINAL ENDOSCOPY  08/31/2017    Dr. Andrea       Social History     Socioeconomic History    Marital status:    Tobacco Use    Smoking status: Never    Smokeless tobacco: Never   Substance and Sexual Activity    Alcohol use: Yes     Alcohol/week: 2.0 - 3.0 standard drinks of alcohol     Types: 2 - 3 Cans of beer per week     Comment: weekends    Drug use: No   Social History Narrative    Occupation: FNP         Medications/Allergies: See med card    Vitals:    10/16/23 1353   BP: (!) 149/88   Pulse: 92   Weight: 83.2 kg (183 lb 5 oz)   Height: 5' 7" (1.702 m)   PainSc:   6   PainLoc: Generalized         Physical exam:  Gen: A and O x3, pleasant, well-groomed  Skin: No rashes or obvious lesions  HEENT: PERRLA, no obvious deformities on ears or in canals. Trachea midline.  CVS: Regular rate and rhythm, normal palpable pulses.  Resp:No increased work of breathing, symmetrical chest rise.  Abdomen: Soft, NT/ND.  Musculoskeletal:  No antalgic gait.     Neuro:  Upper extremities: 5/5 strength bilaterally   Lower extremities: 5/5 strength bilaterally except for left foot 4/5 dorsiflexion  Reflexes:Brachioradialis 1+, Bicep 1+, Tricep 1+.  Patellar 2+, Achilles 1+ right side, 0+ left side.  Sensory:  Intact and symmetrical to light touch and pinprick in C2-T1 dermatomes bilaterally. Intact and symmetrical to light touch and " pinprick in L2-S1 dermatomes bilaterally, except for decreased sensation to light touch along the left lateral lower leg.    Cervical Spine:  Cervical spine: ROM is mildly reduced with flexion, extension and lateral rotation with increased right neck and right trapezius muscle pain during left lateral rotation and extension.  Spurling's maneuver is deferred.  Myofascial exam:  Moderate tenderness to palpation along the right trapezius muscle.    Lumbar spine:  Lumbar spine:  Range of motion moderately reduced with both flexion and extension with severe increased pain with flexion.  Brennan's test is deferred.  Supine straight leg raise causes severe pain back pain bilaterally.    Internal and external rotation of the hip is deferred.  Myofascial exam: No tenderness to palpation across lumbar paraspinous muscles.    Imaging:  MRI lumbar spine 12/13/2018:  T12/L1 through L2/3:  No significant disc bulging, foraminal narrowing.  At L3/4 there is mild disc bulging and facet arthropathy, mild neural foraminal narrowing.  At L4/5 there is posterior disc bulge with annular fissure slightly more to the right causing right lateral recess narrowing.  There is bilateral facet arthropathy and mild foraminal narrowing bilaterally.  At L5/S1 there is mild circumferential disc bulge with central protrusion, mild facet hypertrophy greater on the right.  No canal narrowing, there is mild neural foraminal narrowing left greater than right.    5/15/21 MRI L-spine:  T12-L1: No disc herniation or significant posterior osteophytic ridging. No significant spinal canal or foraminal stenosis.   L1-L2: No disc herniation or significant posterior osteophytic ridging.  Minimal bilateral facet hypertrophy.  No significant spinal canal or foraminal stenosis.   L2-L3: Minimal disc bulge.  Minimal bilateral facet hypertrophy.  Minimal narrowing of the bilateral lateral recesses.  No significant spinal canal stenosis.  Minimal right foraminal  stenosis.   L3-L4: Mild disc bulge.  Mild right and minimal left facet hypertrophy.  Ligamentum flavum thickening.  Mild right and minimal left narrowing of the lateral recesses.  No significant spinal canal stenosis.  Minimal right foraminal stenosis.   L4-L5: Mild disc bulge. Disc height loss. Mild left and minimal right facet hypertrophy.  Mild narrowing of the bilateral lateral recesses. No significant spinal canal stenosis.  Mild bilateral foraminal stenosis.   L5-S1: Tiny left lateral recess protrusion.  Mild right facet hypertrophy.  Minimal narrowing of the left lateral recess.  No significant spinal canal or foraminal stenosis.     5/15/21 MRI C-spine:  C2-C3: No disc herniation or significant posterior osseous ridging. No significant spinal canal or foraminal stenosis.   C3-C4: Minor bilateral uncovertebral spurring.  Minimal left facet hypertrophy.  Preserved ventral and dorsal CSF surrounding the cord.  No significant foraminal stenosis.   C4-C5: Mild disc osteophyte complex.  Ligamentum flavum thickening.  Mild bilateral facet hypertrophy.  Mild ventral cord flattening with preserved ventral and dorsal CSF surrounding the cord.  Moderate bilateral foraminal stenosis.   C5-C6: Fused.  Preserved ventral and dorsal CSF.  No significant foraminal stenosis.   C6-C7: Fused.  Preserved ventral and dorsal CSF.  No significant foraminal stenosis.   C7-T1: No disc herniation or significant posterior osseous ridging. No significant spinal canal or foraminal stenosis.    05/27/2023 MRI cervical spine  Morphology: There is prominent regional artifact from the patient's previous C5-C7 ACDF.  Marrow signal is grossly within normal limits and vertebral body heights appear to be preserved.   Alignment: Cervical spinal alignment is normal.   Cord: The cord is partially obscured at the C5-C6 level on the sagittal views.  No discrete signal abnormality.  No cord compression identified..   Craniocervical Junction:  Cerebellar tonsils are normally positioned. The visualized portions of the posterior fossa are unremarkable. The regional osseous anatomy is normal.    Disc levels:   C2-C3: Mild left-sided facet arthrosis.  The spinal canal and foramina remain patent..   C3-C4: Mild left-sided facet arthrosis minimally narrowing the left foramen.  The spinal canal and right foramen are patent..   C4-C5: Moderate degenerative disc height loss and desiccation slightly progressed from the prior examination.  Shallow broad-based disc osteophyte complex contributes to no more than mild narrowing of the spinal canal.  Shallow uncovertebral spurring and mild bilateral facet arthrosis contributes to mild to moderate left and moderate right foraminal narrowing also slightly progressed from the prior exam..   C5-C6: Limited imaging demonstrates decompression of the spinal canal and foramina..   C6-C7: Limited imaging demonstrates decompression of the spinal canal and foramina..   C7-T1: Within normal limits.  The spinal canal and foramina are patent..     Soft tissues: The visualized paraspinal soft tissues are within normal limits.    05/27/2023 MRI lumbar spine   Special attention to the L4-L5 level demonstrates Modic type 1 endplate changes along the inferior endplate of L4 within associated small Schmorl's node similar to the prior exam, also Modic type 1 endplate changes at the superior surface of L5.   Morphology: Small Schmorl's node is redemonstrated along the inferior endplate of L4 with minimal associated reactive edema stable from the prior examination.  No new marrow edema or focal lesion.  Vertebral body heights are preserved.  No fractures..  Vertebral body heights are preserved.   Alignment: Within normal limits.   Cord: Normal in contour, caliber, and signal intensity.  Conus positioning is within normal limits.   Disc levels:   T12-L1: Mild degenerative disc height loss and desiccation.  Otherwise within normal limits aside  from mild left-sided facet arthrosis.  The spinal canal and foramina are patent.   L1-L2: Within normal limits.  The spinal canal and foramina are patent.   L2-3: Mild degenerative disc height loss and desiccation with shallow disc bulging but no disc herniation.  Mild encroachment of the subarticular recesses.  The spinal canal and foramina remain patent.   L3-L4: Mild degenerative disc height loss and desiccation with shallow disc bulging and mild bilateral facet arthrosis mildly encroaching the subarticular recesses without substantial narrowing of the spinal canal or foramina.   L4-L5: Similar moderate degenerative disc height loss and desiccation with shallow disc bulging slightly lateralizing to the right with an associated annular fissure with asymmetric moderate crutch mint of the right greater than left subarticular recesses and minimal right central spinal canal narrowing.  Mild bilateral facet arthrosis contributes to mild left foraminal narrowing.  The right foramen is patent.   L5-S1: Redemonstrated mild to moderate degenerative disc height loss and desiccation similar to the prior examination.  No disc herniation.  Mild right-sided facet arthrosis.  No significant foraminal narrowing.   Other: Partially imaged simple appearing right renal cyst although incompletely characterized measuring at least 3.3 cm as seen on prior CT 06/26/2018.         Assessment:  The patient is a 53-year-old man with a history of lumbar DDD status post L4/5 diskectomy who presents in referral from Dr. Gabriel Mo for increasing back pain.    1. Cervical radiculopathy        2. Cervical spondylosis        3. Vertebrogenic pain        4. DDD (degenerative disc disease), lumbar        5. DDD (degenerative disc disease), cervical        6. DDD (degenerative disc disease), thoracic                Plan:  1. He is going to have cervical spine surgery on 11/11.  I will have him follow-up in about 2 months.  We discussed Intracept  again and are awaiting insurance approval for this.

## 2024-05-15 ENCOUNTER — OFFICE VISIT (OUTPATIENT)
Dept: GASTROENTEROLOGY | Facility: CLINIC | Age: 54
End: 2024-05-15
Payer: OTHER GOVERNMENT

## 2024-05-15 VITALS — WEIGHT: 184.5 LBS | HEIGHT: 67 IN | BODY MASS INDEX: 28.96 KG/M2

## 2024-05-15 DIAGNOSIS — K20.0 EOSINOPHILIC ESOPHAGITIS: ICD-10-CM

## 2024-05-15 DIAGNOSIS — R13.14 PHARYNGOESOPHAGEAL DYSPHAGIA: Primary | ICD-10-CM

## 2024-05-15 PROCEDURE — 99214 OFFICE O/P EST MOD 30 MIN: CPT | Mod: PBBFAC,PO | Performed by: NURSE PRACTITIONER

## 2024-05-15 PROCEDURE — 99999 PR PBB SHADOW E&M-EST. PATIENT-LVL IV: CPT | Mod: PBBFAC,,, | Performed by: NURSE PRACTITIONER

## 2024-05-15 PROCEDURE — 99204 OFFICE O/P NEW MOD 45 MIN: CPT | Mod: S$PBB,,, | Performed by: NURSE PRACTITIONER

## 2024-05-15 NOTE — PROGRESS NOTES
Subjective:       Patient ID: Brennan Bruner is a 54 y.o. male Body mass index is 29.34 kg/m².    Chief Complaint: GI Problem (Dysphagia & EOE management)    This patient is established with Dr. Andrea & myself (last in 2018).    [facility], summarized throughout progress note.  Blood work reviewed-see records for full results  Reports consulted with GI Dr. Sanon and they discussed about dupixent therapy. Patient reports he would like to start it.    Previously reviewed medical records, summarized below and in medical & surgical history (endoscopies, etc), & are in media:   6/17/13 EGD; allergy testing from 8/21/13 & 7/31/13, 11/8/13 and 6/20/13 visit notes    GI Problem  Primary symptoms do not include fever, weight loss, fatigue, abdominal pain, nausea, vomiting, diarrhea, melena, hematemesis, jaundice or hematochezia.   The illness is also significant for dysphagia (recurred a few months ago; worse with evening meal, fullness/tightness in esophagus; relieved with walking; relieved in the past with EGD with dilation) and odynophagia (with episodes of dysphagia). The illness does not include anorexia or constipation. Significant associated medical issues include GERD (rarely, taking protonix 40 mg once daily; PAST: dexilant, budesionide solution for EOE, flovent). Associated medical issues do not include inflammatory bowel disease.     Review of Systems   Constitutional:  Negative for appetite change, fatigue, fever and weight loss.   HENT:  Positive for trouble swallowing. Negative for sore throat and voice change.    Respiratory:  Negative for cough and shortness of breath.    Cardiovascular:  Negative for chest pain.   Gastrointestinal:  Positive for dysphagia (recurred a few months ago; worse with evening meal, fullness/tightness in esophagus; relieved with walking; relieved in the past with EGD with dilation). Negative for abdominal pain, anal bleeding,  anorexia, blood in stool, constipation, diarrhea, hematemesis, hematochezia, jaundice, melena, nausea, rectal pain and vomiting.   Allergic/Immunologic: Positive for food allergies (Seen by allergist in the past: tested positive for wheat, rye, barley, banana, yeast, watermelon, beef, along with some others; negative for celiac).   Neurological:  Negative for weakness.       Past Medical History:   Diagnosis Date    Arthritis     C. difficile diarrhea     Eosinophilic esophagitis     HLD (hyperlipidemia)     HTN (hypertension)     Irritable bowel syndrome with both constipation and diarrhea 08/25/2020    S/P cervical spinal fusion 08/25/2020    2016     Sleep apnea      Past Surgical History:   Procedure Laterality Date    ANTERIOR CERVICAL DISCECTOMY W/ FUSION  2016    dr banegas    BACK SURGERY  2007    lumbar discectomy L4/l5    COLONOSCOPY N/A 09/10/2018    Procedure: COLONOSCOPY;  Surgeon: Gonzalo Andrea Jr., MD;  Location: [facility];  Service: Endoscopy;  Laterality: N/A;    EPIDURAL STEROID INJECTION INTO CERVICAL SPINE Right 7/25/2023    Procedure: Injection-steroid-epidural-cervical C7/T1;  Surgeon: Trip Pollack MD;  Location: [facility];  Service: Pain Management;  Laterality: Right;    EPIDURAL STEROID INJECTION INTO CERVICAL SPINE N/A 9/13/2023    Procedure: Injection-steroid-epidural-cervical C6/7;  Surgeon: Trip Pollack MD;  Location: [facility];  Service: Pain Management;  Laterality: N/A;    EYE SURGERY Bilateral 2002    lasik    INJECTION OF ANESTHETIC AGENT AROUND MEDIAL BRANCH NERVES INNERVATING LUMBAR FACET JOINT Bilateral 03/26/2019    Procedure: Block-nerve-medial branch-lumbar L3,4,5;  Surgeon: Trip Pollack MD;  Location: [facility];  Service: Pain Management;  Laterality: Bilateral;    INJECTION OF FACET JOINT Bilateral 02/01/2019    Procedure: INJECTION, FACET JOINT L4/5 and L5/S1;  Surgeon: Trip Pollack MD;  Location: [facility];  Service: Pain Management;   Laterality: Bilateral;    NASAL SEPTOPLASTY N/A 03/06/2020    Procedure: SEPTOPLASTY;  Surgeon: Faraz Pastor MD;  Location: [facility] OR;  Service: ENT;  Laterality: N/A;    NASAL TURBINATE REDUCTION Bilateral 03/06/2020    Procedure: REDUCTION-TURBINATE;  Surgeon: Faraz Pastor MD;  Location: [facility] OR;  Service: ENT;  Laterality: Bilateral;    RADIOFREQUENCY ABLATION OF LUMBAR MEDIAL BRANCH NERVE AT SINGLE LEVEL Bilateral 04/09/2019    Procedure: Radiofrequency Ablation, Nerve, Spinal, Lumbar, Medial Branch L3,4,5;  Surgeon: Trip Pollack MD;  Location: [facility] OR;  Service: Pain Management;  Laterality: Bilateral;    RADIOFREQUENCY ABLATION OF LUMBAR MEDIAL BRANCH NERVE AT SINGLE LEVEL Bilateral 09/16/2021    Procedure: Radiofrequency Ablation, Nerve, Spinal, Lumbar, Medial Branch, L4/5, L5/S1;  Surgeon: Trip Pollack MD;  Location: [facility] OR;  Service: Pain Management;  Laterality: Bilateral;    SHOULDER SURGERY Right 1993    right posterior capsulorraphy    TRANSFORAMINAL EPIDURAL INJECTION OF STEROID Bilateral 05/21/2021    Procedure: Injection,steroid,epidural,transforaminal approach L4/5;  Surgeon: Trip Pollack MD;  Location: [facility] OR;  Service: Pain Management;  Laterality: Bilateral;    UPPER GASTROINTESTINAL ENDOSCOPY  06/17/2013    [place]- sent for scanning: stenosis s/p dilation, biopsied GEJ, gastritis, antritis (hard to read handwriting); biospy: antrum mild reactive gastropathy, negative h pylori; GEJ: active esophagitis with increased intraepithelial eosinophils; gastric type mucosa with chronic and active inflammation, no kwan's (no proximal esophagus biopsy done)    UPPER GASTROINTESTINAL ENDOSCOPY  08/31/2017    Dr. Andrea     Family History   Problem Relation Name Age of Onset    Migraines Mother      Hyperlipidemia Sister      Hypertension Maternal Grandmother      Heart attack Maternal Grandmother      Hypertension Maternal Grandfather      Heart attack Maternal  "Grandfather      Heart attack Paternal Grandmother      Hypertension Paternal Grandfather      Heart attack Paternal Grandfather      Colon cancer Neg Hx      Colon polyps Neg Hx      Crohn's disease Neg Hx      Ulcerative colitis Neg Hx      Stomach cancer Neg Hx      Esophageal cancer Neg Hx       Wt Readings from Last 10 Encounters:   05/15/24 83.7 kg (184 lb 8.4 oz)   10/16/23 83.2 kg (183 lb 5 oz)   09/08/23 83.9 kg (185 lb)   08/28/23 84.2 kg (185 lb 11.8 oz)   08/10/23 82 kg (180 lb 12.4 oz)   07/25/23 82 kg (180 lb 12.4 oz)   07/10/23 83 kg (182 lb 15.7 oz)   12/16/21 80.3 kg (176 lb 14.7 oz)   10/15/21 80.8 kg (178 lb 2.1 oz)   09/14/21 79.8 kg (176 lb)     8/31/17 EGD was reviewed and procedure report states:   " Findings:       The oropharynx was normal.       Mucosal changes including longitudinal furrows were found in the        lower third of the esophagus. Esophageal findings were graded using        the Eosinophilic Esophagitis Endoscopic Reference Score (EoE-EREFS)        as: Furrows Grade 1 Present (vertical lines with or without visible        depth) and Stricture none (no stricture found). Biopsies were taken        with a cold forceps for histology.       LA Grade A (one or more mucosal breaks less than 5 mm, not extending        between tops of 2 mucosal folds) esophagitis (tiny erosion) was        found at the gastroesophageal junction.       The Z-line was otherwise regular and was found 39 cm from the        incisors.       Some laxness of the lower esophageal sphincter was noted, but I did        not see a definite hiatal hernia.       Erythematous mucosa was found at a prominient fold in the cardia.        Biopsies were taken with a cold forceps for histology.       Minimal inflammation characterized by erythema was found in the        prepyloric region of the stomach. Biopsies were taken with a cold        forceps for Helicobacter pylori testing using CLOtest. Biopsies were        taken " "with a cold forceps for histology.       The stomach was otherwise normal.       The examined duodenum was normal. Biopsies for histology were taken        with a cold forceps for evaluation of celiac disease.       No endoscopic abnormality was evident in the esophagus to explain        the patient's complaint of dysphagia. It was decided, however, to        proceed with dilation of the entire esophagus. A guidewire was        placed and the scope was withdrawn. Dilation was performed with a        Savary dilator with no resistance at 54 Fr.  Impression:          - Normal oropharynx.                       - Esophageal mucosal changes suggestive of                        eosinophilic esophagitis. Biopsied.                       - LA Grade A reflux esophagitis.                       - Z-line regular, 39 cm from the incisors.                       - Erythematous mucosa in the cardia. Biopsied.                       - No endoscopic esophageal abnormality to explain                        patient's dysphagia. Esophagus dilated, 54 Fr.                       - Antritis. Biopsied.                       - Normal stomach otherwise.                       - Normal examined duodenum. Biopsied.  Recommendation:      - Discharge patient to home.                       - Await pathology and CLOtest results.                       - Follow an antireflux regimen.                       - Continue present medications.                       - Use Protonix (pantoprazole) 40 mg PO daily.                       - Return to GI clinic in 6-8 weeks. ".  Biopsy results:   "Supplemental Diagnosis  Immunohistochemical stains for Helicobacter species are completed on the stomach biopsies from specimens 2  and 3 and are negative for organisms in both biopsy specimens with satisfactory positive and negative controls.  (Electronically Signed: 2017-09-07 11:49:46 )  Diagnosed by: Lesli Savage M.D.  FINAL PATHOLOGIC DIAGNOSIS  1. Esophagus, " "biopsy:  Esophageal squamous mucosa with marked intraepithelial eosinophils (up to over 85 eosinophils identified in a  single high power field). These findings could be consistent with eosinophilic esophagitis the correct clinical setting.  Correlate clinically.  No evidence of intestinal metaplasia, dysplasia or malignancy is identified.  2. Stomach, cardia, biopsy:  Chronic gastritis with focal activity.  Immunostain histochemical staining for Helicobacter species will be performed and results will follow in a  supplemental report.  3. Stomach, antrum, biopsy:  Chronic gastritis.  Immunohistochemical stain for Helicobacter species will be performed and results will follow in a supplemental  report.  4. Duodenum, biopsy:  Duodenal mucosa with no significant histopathologic abnormality.  No evidence of celiac sprue, dysplasia or malignancy."  Objective:      Physical Exam  Vitals and nursing note reviewed.   Constitutional:       General: He is not in acute distress.     Appearance: Normal appearance. He is well-developed. He is not diaphoretic.   HENT:      Mouth/Throat:      Lips: Pink. No lesions.      Mouth: Mucous membranes are moist. No oral lesions.      Tongue: No lesions.      Pharynx: Oropharynx is clear. No pharyngeal swelling or posterior oropharyngeal erythema.   Eyes:      General: No scleral icterus.     Conjunctiva/sclera: Conjunctivae normal.   Pulmonary:      Effort: Pulmonary effort is normal. No respiratory distress.   Abdominal:      General: Bowel sounds are normal. There is no distension or abdominal bruit.      Palpations: Abdomen is soft. Abdomen is not rigid. There is no mass.      Tenderness: There is no abdominal tenderness. There is no guarding or rebound. Negative signs include Ramirez's sign and McBurney's sign.   Skin:     General: Skin is warm and dry.      Coloration: Skin is not jaundiced or pale.      Findings: No erythema or rash.   Neurological:      Mental Status: He is alert " and oriented to person, place, and time.   Psychiatric:         Behavior: Behavior normal.         Thought Content: Thought content normal.         Judgment: Judgment normal.         Assessment:       1. Pharyngoesophageal dysphagia    2. Eosinophilic esophagitis        Plan:     Discussed case with Dr. Cruz; he recommended and agreed with below management plan    Pharyngoesophageal dysphagia  - schedule EGD, discussed procedure with patient and possible esophageal dilation may be performed during procedure if indicated, patient verbalized understanding  - educated patient to eat smaller more frequent meals and to eat slowly and advised to eat a soft diet.  - possible UGI/esophagram/esophageal manometry if symptoms persist    Eosinophilic esophagitis  -  START   dupilumab (DUPIXENT) 300 mg/2 mL Syrg; Inject 2 mLs (300 mg total) into the skin once a week.  Dispense: 8 mL; Refill: 11  - continue protonix 40 mg once daily as directed  - schedule EGD, discussed procedure with patient, including risks and benefits, patient verbalized understanding  -     Quantiferon Gold TB; Future; Expected date: 05/15/2024  - avoid known allergies; Recommend follow-up with allergist for continued evaluation and management.    Follow up in about 1 month (around 6/15/2024), or if symptoms worsen or fail to improve.      If no improvement in symptoms or symptoms worsen, call/follow-up at clinic or go to ER.      28 minutes of total time spent on the encounter, which includes face to face time and non-face to face time preparing to see the patient (e.g., review of tests), Obtaining and/or reviewing separately obtained history, Documenting clinical information in the electronic or other health record, Independently interpreting results (not separately reported) and communicating results to the patient/family/caregiver, or Care coordination (not separately reported).

## 2024-05-24 ENCOUNTER — TELEPHONE (OUTPATIENT)
Dept: GASTROENTEROLOGY | Facility: CLINIC | Age: 54
End: 2024-05-24
Payer: OTHER GOVERNMENT

## 2024-05-24 NOTE — TELEPHONE ENCOUNTER
----- Message from SILVIO Keith sent at 5/24/2024  1:23 PM CDT -----  I have sent a message to Dr. Andrea's staff to get you scheduled for a sooner date.     Kailey- This patient needs an EGD sometime in 2-4 weeks.  Thank you,  Naty  ----- Message -----  From: Brennan Bruner FNP  Sent: 5/24/2024   1:05 PM CDT  To: SILVIO Keith    Dysphagia episodes are coming more frequently. I dont think I am going to make it to August. I will be out of town for the next 2 weeks.

## 2024-06-07 ENCOUNTER — TELEPHONE (OUTPATIENT)
Dept: GASTROENTEROLOGY | Facility: CLINIC | Age: 54
End: 2024-06-07
Payer: OTHER GOVERNMENT

## 2024-06-07 NOTE — TELEPHONE ENCOUNTER
Attempted to reach the patient, left message to discuss dates of procedure, clinic number provided.

## 2024-06-10 ENCOUNTER — TELEPHONE (OUTPATIENT)
Dept: GASTROENTEROLOGY | Facility: CLINIC | Age: 54
End: 2024-06-10
Payer: OTHER GOVERNMENT

## 2024-06-10 NOTE — TELEPHONE ENCOUNTER
----- Message from Aarti Keller sent at 6/10/2024 11:47 AM CDT -----  Regarding: Returning phone call  Contact: pt  Type:  Patient Returning Call    Who Called:pt    Who Left Message for Patient:willem    Does the patient know what this is regarding?:scheduling    Would the patient rather a call back or a response via [facility]? Call back    Best Call Back Number:[phone]

## 2024-06-10 NOTE — TELEPHONE ENCOUNTER
Returned call to the patient, offered several available date for procedure, declined all offered dates, will wait for other cancellations, patient verbalized understanding of this.

## 2024-06-10 NOTE — TELEPHONE ENCOUNTER
Attempted to reach the patient to offer dates for sooner EGD, left message, clinic number provided.

## 2024-06-19 ENCOUNTER — TELEPHONE (OUTPATIENT)
Dept: GASTROENTEROLOGY | Facility: CLINIC | Age: 54
End: 2024-06-19
Payer: OTHER GOVERNMENT

## 2024-06-19 NOTE — TELEPHONE ENCOUNTER
Called and offered patient a sooner availability, patient accepted the offer, surgery center notified of add on via teams message.

## 2024-06-19 NOTE — TELEPHONE ENCOUNTER
Called and left a message asking the patient if the date of 07/02/2024 will work for him to have his EGD, clinic number provided.

## 2024-06-20 ENCOUNTER — ANESTHESIA EVENT (OUTPATIENT)
Dept: ENDOSCOPY | Facility: HOSPITAL | Age: 54
End: 2024-06-20
Payer: OTHER GOVERNMENT

## 2024-06-20 ENCOUNTER — ANESTHESIA (OUTPATIENT)
Dept: ENDOSCOPY | Facility: HOSPITAL | Age: 54
End: 2024-06-20
Payer: OTHER GOVERNMENT

## 2024-06-20 ENCOUNTER — HOSPITAL ENCOUNTER (OUTPATIENT)
Facility: HOSPITAL | Age: 54
Discharge: HOME | End: 2024-06-20
Attending: INTERNAL MEDICINE | Admitting: FAMILY MEDICINE
Payer: OTHER GOVERNMENT

## 2024-06-20 DIAGNOSIS — R13.10 DYSPHAGIA: ICD-10-CM

## 2024-06-20 PROCEDURE — 63600175 PHARM REV CODE 636 W HCPCS: Mod: PO | Performed by: NURSE ANESTHETIST, CERTIFIED REGISTERED

## 2024-06-20 PROCEDURE — 43239 EGD BIOPSY SINGLE/MULTIPLE: CPT | Mod: 59,PO | Performed by: INTERNAL MEDICINE

## 2024-06-20 PROCEDURE — 37000008 HC ANESTHESIA 1ST 15 MINUTES: Mod: PO | Performed by: INTERNAL MEDICINE

## 2024-06-20 PROCEDURE — C1726 CATH, BAL DIL, NON-VASCULAR: HCPCS | Mod: PO | Performed by: INTERNAL MEDICINE

## 2024-06-20 PROCEDURE — 88305 TISSUE EXAM BY PATHOLOGIST: CPT | Mod: 26,,, | Performed by: PATHOLOGY

## 2024-06-20 PROCEDURE — 63600175 PHARM REV CODE 636 W HCPCS: Mod: PO | Performed by: INTERNAL MEDICINE

## 2024-06-20 PROCEDURE — 43249 ESOPH EGD DILATION <30 MM: CPT | Mod: ,,, | Performed by: INTERNAL MEDICINE

## 2024-06-20 PROCEDURE — 43239 EGD BIOPSY SINGLE/MULTIPLE: CPT | Mod: 59,,, | Performed by: INTERNAL MEDICINE

## 2024-06-20 PROCEDURE — 43249 ESOPH EGD DILATION <30 MM: CPT | Mod: PO | Performed by: INTERNAL MEDICINE

## 2024-06-20 PROCEDURE — 25000003 PHARM REV CODE 250: Mod: PO | Performed by: NURSE ANESTHETIST, CERTIFIED REGISTERED

## 2024-06-20 PROCEDURE — 88305 TISSUE EXAM BY PATHOLOGIST: CPT | Mod: 59,PO | Performed by: PATHOLOGY

## 2024-06-20 PROCEDURE — 37000009 HC ANESTHESIA EA ADD 15 MINS: Mod: PO | Performed by: INTERNAL MEDICINE

## 2024-06-20 PROCEDURE — 27201012 HC FORCEPS, HOT/COLD, DISP: Mod: PO | Performed by: INTERNAL MEDICINE

## 2024-06-20 RX ORDER — SODIUM CHLORIDE 0.9 % (FLUSH) 0.9 %
10 SYRINGE (ML) INJECTION
Status: DISCONTINUED | OUTPATIENT
Start: 2024-06-20 | End: 2024-06-20 | Stop reason: HOSPADM

## 2024-06-20 RX ORDER — PROPOFOL 10 MG/ML
VIAL (ML) INTRAVENOUS
Status: DISCONTINUED | OUTPATIENT
Start: 2024-06-20 | End: 2024-06-20

## 2024-06-20 RX ORDER — LIDOCAINE HYDROCHLORIDE 20 MG/ML
INJECTION INTRAVENOUS
Status: DISCONTINUED | OUTPATIENT
Start: 2024-06-20 | End: 2024-06-20

## 2024-06-20 RX ORDER — PANTOPRAZOLE SODIUM 40 MG/1
40 TABLET, DELAYED RELEASE ORAL
Qty: 90 TABLET | Refills: 4 | Status: SHIPPED | OUTPATIENT
Start: 2024-06-20 | End: 2025-06-20

## 2024-06-20 RX ORDER — SODIUM CHLORIDE, SODIUM LACTATE, POTASSIUM CHLORIDE, CALCIUM CHLORIDE 600; 310; 30; 20 MG/100ML; MG/100ML; MG/100ML; MG/100ML
INJECTION, SOLUTION INTRAVENOUS CONTINUOUS
Status: DISCONTINUED | OUTPATIENT
Start: 2024-06-20 | End: 2024-06-20 | Stop reason: HOSPADM

## 2024-06-20 RX ADMIN — PROPOFOL 50 MG: 10 INJECTION, EMULSION INTRAVENOUS at 03:06

## 2024-06-20 RX ADMIN — GLYCOPYRROLATE 0.2 MG: 0.2 INJECTION, SOLUTION INTRAMUSCULAR; INTRAVENOUS at 03:06

## 2024-06-20 RX ADMIN — LIDOCAINE HYDROCHLORIDE 100 MG: 20 INJECTION INTRAVENOUS at 03:06

## 2024-06-20 RX ADMIN — SODIUM CHLORIDE, POTASSIUM CHLORIDE, SODIUM LACTATE AND CALCIUM CHLORIDE: 600; 310; 30; 20 INJECTION, SOLUTION INTRAVENOUS at 02:06

## 2024-06-20 RX ADMIN — PROPOFOL 100 MG: 10 INJECTION, EMULSION INTRAVENOUS at 03:06

## 2024-06-20 NOTE — H&P
History & Physical - Short Stay  Gastroenterology      SUBJECTIVE:     Procedure: Gastroscopy    Chief Complaint/Indication for Procedure:  Dysphagia    History of Present Illness:  See recent GI OV note:  Office Visit   5/15/2024  [facility] - G[facility]enterology       Mai Crowder FNP  Gastroenterology Pharyngoesophageal dysphagia +1 more  Dx GI Problem ; Referred by Administration, [facility]  Reason for Visit     Progress Notes    Mai Crowder FNP at 5/15/2024  3:30 PM    Status: Signed   Expand All Collapse All  Subjective:         Subjective  Patient ID: Brennan Bruner is a 54 y.o. male Body mass index is 29.34 kg/m².     Chief Complaint: GI Problem (Dysphagia & EOE management)     This patient is established with Dr. Andrea & myself (last in 2018).     [facility], summarized throughout progress note.  Blood work reviewed-see records for full results  Reports consulted with GI Dr. Sanon and they discussed about dupixent therapy. Patient reports he would like to start it.     Previously reviewed medical records, summarized below and in medical & surgical history (endoscopies, etc), & are in media:   6/17/13 EGD; allergy testing from 8/21/13 & 7/31/13, 11/8/13 and 6/20/13 visit notes     GI Problem  Primary symptoms do not include fever, weight loss, fatigue, abdominal pain, nausea, vomiting, diarrhea, melena, hematemesis, jaundice or hematochezia.   The illness is also significant for dysphagia (recurred a few months ago; worse with evening meal, fullness/tightness in esophagus; relieved with walking; relieved in the past with EGD with dilation) and odynophagia (with episodes of dysphagia). The illness does not include anorexia or constipation. Significant associated medical issues include GERD (rarely, taking protonix 40 mg once daily; PAST: dexilant, budesionide solution for EOE, flovent). Associated medical issues do not include inflammatory  "bowel disease.      Review of Systems   Constitutional:  Negative for appetite change, fatigue, fever and weight loss.   HENT:  Positive for trouble swallowing. Negative for sore throat and voice change.    Respiratory:  Negative for cough and shortness of breath.    Cardiovascular:  Negative for chest pain.   Gastrointestinal:  Positive for dysphagia (recurred a few months ago; worse with evening meal, fullness/tightness in esophagus; relieved with walking; relieved in the past with EGD with dilation). Negative for abdominal pain, anal bleeding, anorexia, blood in stool, constipation, diarrhea, hematemesis, hematochezia, jaundice, melena, nausea, rectal pain and vomiting.     Wt Readings from Last 10 Encounters:   05/15/24 83.7 kg (184 lb 8.4 oz)   10/16/23 83.2 kg (183 lb 5 oz)   09/08/23 83.9 kg (185 lb)   08/28/23 84.2 kg (185 lb 11.8 oz)   08/10/23 82 kg (180 lb 12.4 oz)   07/25/23 82 kg (180 lb 12.4 oz)   07/10/23 83 kg (182 lb 15.7 oz)   12/16/21 80.3 kg (176 lb 14.7 oz)   10/15/21 80.8 kg (178 lb 2.1 oz)   09/14/21 79.8 kg (176 lb)       8/31/17 EGD was reviewed and procedure report states:   " Findings:       The oropharynx was normal.       Mucosal changes including longitudinal furrows were found in the        lower third of the esophagus. Esophageal findings were graded using        the Eosinophilic Esophagitis Endoscopic Reference Score (EoE-EREFS)        as: Furrows Grade 1 Present (vertical lines with or without visible        depth) and Stricture none (no stricture found). Biopsies were taken        with a cold forceps for histology.       LA Grade A (one or more mucosal breaks less than 5 mm, not extending        between tops of 2 mucosal folds) esophagitis (tiny erosion) was        found at the gastroesophageal junction.       The Z-line was otherwise regular and was found 39 cm from the        incisors.       Some laxness of the lower esophageal sphincter was noted, but I did        not see a " "definite hiatal hernia.       Erythematous mucosa was found at a prominient fold in the cardia.        Biopsies were taken with a cold forceps for histology.       Minimal inflammation characterized by erythema was found in the        prepyloric region of the stomach. Biopsies were taken with a cold        forceps for Helicobacter pylori testing using CLOtest. Biopsies were        taken with a cold forceps for histology.       The stomach was otherwise normal.       The examined duodenum was normal. Biopsies for histology were taken        with a cold forceps for evaluation of celiac disease.       No endoscopic abnormality was evident in the esophagus to explain        the patient's complaint of dysphagia. It was decided, however, to        proceed with dilation of the entire esophagus. A guidewire was        placed and the scope was withdrawn. Dilation was performed with a        [employer] dilator with no resistance at 54 Fr.  Impression:          - Normal oropharynx.                       - Esophageal mucosal changes suggestive of                        eosinophilic esophagitis. Biopsied.                       - LA Grade A reflux esophagitis.                       - Z-line regular, 39 cm from the incisors.                       - Erythematous mucosa in the cardia. Biopsied.                       - No endoscopic esophageal abnormality to explain                        patient's dysphagia. Esophagus dilated, 54 Fr.                       - Antritis. Biopsied.                       - Normal stomach otherwise.                       - Normal examined duodenum. Biopsied.  Recommendation:      - Discharge patient to home.                       - Await pathology and CLOtest results.                       - Follow an antireflux regimen.                       - Continue present medications.                       - Use Protonix (pantoprazole) 40 mg PO daily.                       - Return to GI clinic in 6-8 weeks. ".  Biopsy " "results:   "Supplemental Diagnosis  Immunohistochemical stains for Helicobacter species are completed on the stomach biopsies from specimens 2  and 3 and are negative for organisms in both biopsy specimens with satisfactory positive and negative controls.  (Electronically Signed: 2017-09-07 11:49:46 )  Diagnosed by: Lesli Savage M.D.  FINAL PATHOLOGIC DIAGNOSIS  1. Esophagus, biopsy:  Esophageal squamous mucosa with marked intraepithelial eosinophils (up to over 85 eosinophils identified in a  single high power field). These findings could be consistent with eosinophilic esophagitis the correct clinical setting.  Correlate clinically.  No evidence of intestinal metaplasia, dysplasia or malignancy is identified.  2. Stomach, cardia, biopsy:  Chronic gastritis with focal activity.  Immunostain histochemical staining for Helicobacter species will be performed and results will follow in a  supplemental report.  3. Stomach, antrum, biopsy:  Chronic gastritis.  Immunohistochemical stain for Helicobacter species will be performed and results will follow in a supplemental  report.  4. Duodenum, biopsy:  Duodenal mucosa with no significant histopathologic abnormality.  No evidence of celiac sprue, dysplasia or malignancy."    Assessment:      Assessment  1. Pharyngoesophageal dysphagia    2. Eosinophilic esophagitis          Plan:      Plan  Discussed case with Dr. Cruz; he recommended and agreed with below management plan     Pharyngoesophageal dysphagia  - schedule EGD, discussed procedure with patient and possible esophageal dilation may be performed during procedure if indicated, patient verbalized understanding  - educated patient to eat smaller more frequent meals and to eat slowly and advised to eat a soft diet.  - possible UGI/esophagram/esophageal manometry if symptoms persist     Eosinophilic esophagitis  -  START   dupilumab (DUPIXENT) 300 mg/2 mL Syrg; Inject 2 mLs (300 mg total) into the skin once a " week.  Dispense: 8 mL; Refill: 11  - continue protonix 40 mg once daily as directed  - schedule EGD, discussed procedure with patient, including risks and benefits, patient verbalized understanding  -     Quantiferon Gold TB; Future; Expected date: 05/15/2024  - avoid known allergies; Recommend follow-up with allergist for continued evaluation and management.     Follow up in about 1 month (around 6/15/2024), or if symptoms worsen or fail to improve.             PTA Medications   Medication Sig    amLODIPine (NORVASC) 5 MG tablet Take 5 mg by mouth once daily.    atorvastatin (LIPITOR) 20 MG tablet Take 20 mg by mouth once daily.    docusate sodium (COLACE) 50 MG capsule Take 50 mg by mouth every 12 (twelve) hours as needed for Constipation.    ezetimibe (ZETIA) 10 mg tablet Take 10 mg by mouth once daily.    fluticasone propionate (FLONASE) 50 mcg/actuation nasal spray 1 spray by Each Nostril route once daily.    HYDROcodone-acetaminophen (NORCO)  mg per tablet Take 1 tablet by mouth every 6 (six) hours as needed for Pain.    LIDOcaine (LIDODERM) 5 % Place 1 patch onto the skin once daily.    ondansetron (ZOFRAN-ODT) 8 MG TbDL Take 1 tablet (8 mg total) by mouth every 6 (six) hours as needed.    pantoprazole (PROTONIX) 40 MG tablet Take 1 tablet (40 mg total) by mouth before breakfast.    promethazine (PHENERGAN) 25 MG tablet Take 1 tablet (25 mg total) by mouth every 4 (four) hours as needed for Nausea.    tadalafiL (CIALIS) 20 MG Tab Take 20 mg by mouth daily as needed (intercourse).    tiZANidine 4 mg Cap Take 1 capsule by mouth as needed.    valsartan (DIOVAN) 320 MG tablet Take 1 tablet (320 mg total) by mouth once daily.    zolpidem (AMBIEN) 10 mg Tab Take 10 mg by mouth nightly as needed.    azelastine (ASTELIN) 137 mcg (0.1 %) nasal spray 2 sprays (274 mcg total) by Nasal route 2 (two) times daily.    dupilumab (DUPIXENT) 300 mg/2 mL Syrg Inject 2 mLs (300 mg total) into the skin once a week.        Review of patient's allergies indicates:   Allergen Reactions    Banana     Duloxetine     Sildenafil Other (See Comments)        Past Medical History:   Diagnosis Date    Arthritis     C. difficile diarrhea     Eosinophilic esophagitis     HLD (hyperlipidemia)     HTN (hypertension)     Irritable bowel syndrome with both constipation and diarrhea 08/25/2020    S/P cervical spinal fusion 08/25/2020    2016     Sleep apnea      Past Surgical History:   Procedure Laterality Date    ANTERIOR CERVICAL DISCECTOMY W/ FUSION  2016    dr banegas    BACK SURGERY  2007    lumbar discectomy L4/l5    COLONOSCOPY N/A 09/10/2018    Procedure: COLONOSCOPY;  Surgeon: Gonzalo Andrea Jr., MD;  Location: [facility];  Service: Endoscopy;  Laterality: N/A; repeat in 8 years for surveillance    EPIDURAL STEROID INJECTION INTO CERVICAL SPINE Right 07/25/2023    Procedure: Injection-steroid-epidural-cervical C7/T1;  Surgeon: Trip Pollack MD;  Location: [facility];  Service: Pain Management;  Laterality: Right;    EPIDURAL STEROID INJECTION INTO CERVICAL SPINE N/A 09/13/2023    Procedure: Injection-steroid-epidural-cervical C6/7;  Surgeon: Trip Pollack MD;  Location: [facility];  Service: Pain Management;  Laterality: N/A;    EYE SURGERY Bilateral 2002    lasik    INJECTION OF ANESTHETIC AGENT AROUND MEDIAL BRANCH NERVES INNERVATING LUMBAR FACET JOINT Bilateral 03/26/2019    Procedure: Block-nerve-medial branch-lumbar L3,4,5;  Surgeon: Trip Pollack MD;  Location: [facility] OR;  Service: Pain Management;  Laterality: Bilateral;    INJECTION OF FACET JOINT Bilateral 02/01/2019    Procedure: INJECTION, FACET JOINT L4/5 and L5/S1;  Surgeon: Trip Pollack MD;  Location: [facility] OR;  Service: Pain Management;  Laterality: Bilateral;    NASAL SEPTOPLASTY N/A 03/06/2020    Procedure: SEPTOPLASTY;  Surgeon: Faraz Pastor MD;  Location: [facility] OR;  Service: ENT;  Laterality: N/A;    NASAL TURBINATE REDUCTION Bilateral  03/06/2020    Procedure: REDUCTION-TURBINATE;  Surgeon: Faraz Pastor MD;  Location: [facility] OR;  Service: ENT;  Laterality: Bilateral;    RADIOFREQUENCY ABLATION OF LUMBAR MEDIAL BRANCH NERVE AT SINGLE LEVEL Bilateral 04/09/2019    Procedure: Radiofrequency Ablation, Nerve, Spinal, Lumbar, Medial Branch L3,4,5;  Surgeon: Trip Pollack MD;  Location: [facility] OR;  Service: Pain Management;  Laterality: Bilateral;    RADIOFREQUENCY ABLATION OF LUMBAR MEDIAL BRANCH NERVE AT SINGLE LEVEL Bilateral 09/16/2021    Procedure: Radiofrequency Ablation, Nerve, Spinal, Lumbar, Medial Branch, L4/5, L5/S1;  Surgeon: Trip Pollack MD;  Location: [facility] OR;  Service: Pain Management;  Laterality: Bilateral;    SHOULDER SURGERY Right 1993    right posterior capsulorraphy    TRANSFORAMINAL EPIDURAL INJECTION OF STEROID Bilateral 05/21/2021    Procedure: Injection,steroid,epidural,transforaminal approach L4/5;  Surgeon: Trip Pollack MD;  Location: [facility] OR;  Service: Pain Management;  Laterality: Bilateral;    UPPER GASTROINTESTINAL ENDOSCOPY  06/17/2013    [facility]- sent for scanning: stenosis s/p dilation, biopsied GEJ, gastritis, antritis (hard to read handwriting); biospy: antrum mild reactive gastropathy, negative h pylori; GEJ: active esophagitis with increased intraepithelial eosinophils; gastric type mucosa with chronic and active inflammation, no kwan's (no proximal esophagus biopsy done)    UPPER GASTROINTESTINAL ENDOSCOPY  08/31/2017    Dr. Andrea     Family History   Problem Relation Name Age of Onset    Migraines Mother      Hyperlipidemia Sister      Hypertension Maternal Grandmother      Heart attack Maternal Grandmother      Hypertension Maternal Grandfather      Heart attack Maternal Grandfather      Heart attack Paternal Grandmother      Hypertension Paternal Grandfather      Heart attack Paternal Grandfather      Colon cancer Neg Hx      Colon polyps Neg Hx      Crohn's disease Neg Hx       "Ulcerative colitis Neg Hx      Stomach cancer Neg Hx      Esophageal cancer Neg Hx       Social History     Tobacco Use    Smoking status: Never    Smokeless tobacco: Never   Substance Use Topics    Alcohol use: Yes     Alcohol/week: 2.0 - 3.0 standard drinks of alcohol     Types: 2 - 3 Cans of beer per week     Comment: weekends    Drug use: No         OBJECTIVE:     Vital Signs (Most Recent)  Temp: 97.3 °F (36.3 °C) (06/20/24 1414)  Pulse: 67 (06/20/24 1414)  Resp: 16 (06/20/24 1414)  BP: (!) 141/89 (06/20/24 1414)  SpO2: 99 % (06/20/24 1414)    Physical Exam:  :Ht: 5' 6" (167.6 cm)   Wt: 83.5 kg   BMI: 29.70 kg/m² .                                                        GENERAL:  Comfortable, in no acute distress.                                 HEENT EXAM:  Nonicteric.  No adenopathy.  Oropharynx is clear.               NECK:  Supple.                                                               LUNGS:  Clear.                                                               CARDIAC:  Regular rate and rhythm.  S1, S2.  No murmur.                      ABDOMEN:  Obese.  Soft, positive bowel sounds, nontender.  No hepatosplenomegaly or masses.  No rebound or guarding.                                             EXTREMITIES:  No edema.     MENTAL STATUS:  Alert and oriented.    ASSESSMENT/PLAN:     Assessment: Dysphagia.  R/O EoE.    Plan: Gastroscopy    Anesthesia Plan:   MAC / General Anaesthesia    ASA Grade: ASA 2 - Patient with mild systemic disease with no functional limitations    MALLAMPATI SCORE: II (hard and soft palate, upper portion of tonsils anduvula visible)    "

## 2024-06-20 NOTE — BRIEF OP NOTE
Discharge Note  Short Stay      SUMMARY     Admit Date: 6/20/2024    Attending Physician: Gonzalo Andrea Jr., MD     Discharge Physician: Gonzalo Andrea Jr., MD    Discharge Date: 6/20/2024 3:46 PM    Final Diagnosis: Dysphagia, unspecified type [R13.10]  Eosinophilic esophagitis [K20.0]    Impression:            - Normal oropharynx.                          - Normal larynx.                          - Normal cricopharyngeus.                          - Esophageal mucosal changes suggestive of                          eosinophilic esophagitis.                          - Benign-appearing esophageal stenosis. Dilated.                          - Z-line regular, 39 cm from the incisors.                          - Normal cardia, gastric fundus and gastric body.                          - Normal antrum and prepyloric region of the                          stomach.                          - Normal pylorus.                          - Normal examined duodenum.                          - Normal major papilla.                          - Biopsies were taken with a cold forceps for                          evaluation of eosinophilic esophagitis.   Recommendation:        - Discharge patient to home.                          - Observe patient's clinical course following                          today's procedure with therapeutic intervention.                          - Await pathology results.                          - Follow an antireflux regimen.                          - Continue present medications.                          - Use Protonix (pantoprazole) 40 mg PO daily.   Gonzalo Andrea MD   6/20/2024         Disposition: HOME OR SELF CARE    Patient Instructions:   Current Discharge Medication List        CONTINUE these medications which have CHANGED    Details   pantoprazole (PROTONIX) 40 MG tablet Take 1 tablet (40 mg total) by mouth before breakfast.  Qty: 90 tablet, Refills: 4           CONTINUE these  medications which have NOT CHANGED    Details   amLODIPine (NORVASC) 5 MG tablet Take 5 mg by mouth once daily.      atorvastatin (LIPITOR) 20 MG tablet Take 20 mg by mouth once daily.      docusate sodium (COLACE) 50 MG capsule Take 50 mg by mouth every 12 (twelve) hours as needed for Constipation.      ezetimibe (ZETIA) 10 mg tablet Take 10 mg by mouth once daily.      fluticasone propionate (FLONASE) 50 mcg/actuation nasal spray 1 spray by Each Nostril route once daily.      HYDROcodone-acetaminophen (NORCO)  mg per tablet Take 1 tablet by mouth every 6 (six) hours as needed for Pain.  Qty: 120 tablet, Refills: 0    Comments: Quantity prescribed more than 7 day supply? Yes, quantity medically necessary      LIDOcaine (LIDODERM) 5 % Place 1 patch onto the skin once daily.  Qty: 30 patch, Refills: 0      ondansetron (ZOFRAN-ODT) 8 MG TbDL Take 1 tablet (8 mg total) by mouth every 6 (six) hours as needed.  Qty: 12 tablet, Refills: 1      promethazine (PHENERGAN) 25 MG tablet Take 1 tablet (25 mg total) by mouth every 4 (four) hours as needed for Nausea.  Qty: 30 tablet, Refills: 0      tadalafiL (CIALIS) 20 MG Tab Take 20 mg by mouth daily as needed (intercourse).      tiZANidine 4 mg Cap Take 1 capsule by mouth as needed.      valsartan (DIOVAN) 320 MG tablet Take 1 tablet (320 mg total) by mouth once daily.  Qty: 90 tablet, Refills: 0    Comments: .      zolpidem (AMBIEN) 10 mg Tab Take 10 mg by mouth nightly as needed.      azelastine (ASTELIN) 137 mcg (0.1 %) nasal spray 2 sprays (274 mcg total) by Nasal route 2 (two) times daily.  Qty: 90 mL, Refills: 3      dupilumab (DUPIXENT) 300 mg/2 mL Syrg Inject 2 mLs (300 mg total) into the skin once a week.  Qty: 8 mL, Refills: 11    Associated Diagnoses: Eosinophilic esophagitis             Discharge Procedure Orders (must include Diet, Follow-up, Activity)    Follow Up:  Follow up with PCP as per your routine.  Please follow an anti reflux diet and a high fiber  diet.  Activity as tolerated.    No driving day of procedure.

## 2024-06-20 NOTE — ANESTHESIA PREPROCEDURE EVALUATION
06/20/2024  Brennan Bruner is a 54 y.o., male.      Pre-op Assessment    I have reviewed the Patient Summary Reports.     I have reviewed the Nursing Notes. I have reviewed the NPO Status.   I have reviewed the Medications.     Review of Systems  Anesthesia Hx:  No problems with previous Anesthesia                Cardiovascular:     Hypertension                                  Hypertension         Pulmonary:        Sleep Apnea     Obstructive Sleep Apnea (MARVIN).           Renal/:  Chronic Renal Disease        Kidney Function/Disease             Hepatic/GI:     GERD      Gerd          Musculoskeletal:  Arthritis   S/P ACF     Arthritis     Spine Disorders: cervical and lumbar            Neurological:    Neuromuscular Disease,           Arthritis  Peripheral Neuropathy                        Neuromuscular Disease       Physical Exam  General: Well nourished    Airway:  Mallampati: II   Mouth Opening: Normal  TM Distance: Normal  Neck ROM: Normal ROM        Anesthesia Plan  Type of Anesthesia, risks & benefits discussed:    Anesthesia Type: Gen Natural Airway  Intra-op Monitoring Plan: Standard ASA Monitors  Induction:  IV  Informed Consent: Informed consent signed with the Patient and all parties understand the risks and agree with anesthesia plan.  All questions answered.   ASA Score: 2    Ready For Surgery From Anesthesia Perspective.     .

## 2024-06-20 NOTE — PROVATION PATIENT INSTRUCTIONS
Discharge Summary/Instructions after an Endoscopic Procedure  Patient Name: Brennan Bruner  Patient MRN: [id number]  Patient YOB: 1970 Thursday, June 20, 2024  Gonzalo Andrea MD  Dear patient,  As a result of recent federal legislation (The Federal Cures Act), you may   receive lab or pathology results from your procedure in your [facility]   account before your physician is able to contact you. Your physician or   their representative will relay the results to you with their   recommendations at their soonest availability.  Thank you,  RESTRICTIONS:  During your procedure today, you received medications for sedation.  These   medications may affect your judgment, balance and coordination.  Therefore,   for 24 hours, you have the following restrictions:   - DO NOT drive a car, operate machinery, make legal/financial decisions,   sign important papers or drink alcohol.    ACTIVITY:  Today: no heavy lifting, straining or running due to procedural   sedation/anesthesia.  The following day: return to full activity including work.  DIET:  Eat and drink normally unless instructed otherwise.     TREATMENT FOR COMMON SIDE EFFECTS:  - Mild abdominal pain, nausea, belching, bloating or excessive gas:  rest,   eat lightly and use a heating pad.  - Sore Throat: treat with throat lozenges and/or gargle with warm salt   water.  - Because air was used during the procedure, expelling large amounts of air   from your rectum or belching is normal.  - If a bowel prep was taken, you may not have a bowel movement for 1-3 days.    This is normal.  SYMPTOMS TO WATCH FOR AND REPORT TO YOUR PHYSICIAN:  1. Abdominal pain or bloating, other than gas cramps.  2. Chest pain.  3. Back pain.  4. Signs of infection such as: chills or fever occurring within 24 hours   after the procedure.  5. Rectal bleeding, which would show as bright red, maroon, or black stools.   (A tablespoon of blood from the rectum is not serious,  especially if   hemorrhoids are present.)  6. Vomiting.  7. Weakness or dizziness.  GO DIRECTLY TO THE NEAREST EMERGENCY ROOM IF YOU HAVE ANY OF THE FOLLOWING:      Difficulty breathing              Chills and/or fever over 101 F   Persistent vomiting and/or vomiting blood   Severe abdominal pain   Severe chest pain   Black, tarry stools   Bleeding- more than one tablespoon   Any other symptom or condition that you feel may need urgent attention  Your doctor recommends these additional instructions:  If any biopsies were taken, your doctors clinic will contact you in 1 to 2   weeks with any results.  Follow an antireflux regimen.  This includes:       - Do not lie down for at least 3 to 4 hours after meals.        - Raise the head of the bed 4 to 6 inches.        - Decrease excess weight.        - Avoid citrus juices and other acidic foods, alcohol, chocolate, mints,   coffee and other caffeinated beverages, carbonated beverages, fatty and   fried foods.        - Avoid tight-fitting clothing.        - Avoid cigarettes and other tobacco products.   Continue your present medications.   Take Protonix (pantoprazole) 40 mg by mouth once a day.   Return to GI clinic in 6-8 weeks.   Consider Dupixent.   For questions, problems or results please call your physician - Gonzalo Andrea MD at Work:  [phone].  EMERGENCY PHONE NUMBER: [phone], LAB RESULTS: [phone]  IF A COMPLICATION OR EMERGENCY SITUATION ARISES AND YOU ARE UNABLE TO REACH   YOUR PHYSICIAN - GO DIRECTLY TO THE EMERGENCY ROOM.  ___________________________________________  Nurse Signature  ___________________________________________  Patient/Designated Responsible Party Signature  Gonzalo Andrea MD  6/20/2024 3:44:22 PM  This report has been verified and signed electronically.  Dear patient,  As a result of recent federal legislation (The Federal Cures Act), you may   receive lab or pathology results from your procedure in your  [facility]   account before your physician is able to contact you. Your physician or   their representative will relay the results to you with their   recommendations at their soonest availability.  Thank you.  [employer]ATION

## 2024-06-20 NOTE — TRANSFER OF CARE
"Anesthesia Transfer of Care Note    Patient: Brennan Bruner    Procedure(s) Performed: Procedure(s) (LRB):  EGD (ESOPHAGOGASTRODUODENOSCOPY) (N/A)    Patient location: PACU    Anesthesia Type: general    Transport from OR: Transported from OR on room air with adequate spontaneous ventilation    Post pain: adequate analgesia    Post assessment: no apparent anesthetic complications and tolerated procedure well    Post vital signs: stable    Level of consciousness: awake    Nausea/Vomiting: no nausea/vomiting    Complications: none    Transfer of care protocol was followed      Last vitals: Visit Vitals  BP (!) 141/89 (BP Location: Left arm, Patient Position: Sitting)   Pulse 67   Temp 36.3 °C (97.3 °F) (Skin)   Resp 16   Ht 5' 6" (1.676 m)   Wt 83.5 kg (184 lb)   SpO2 99%   BMI 29.70 kg/m²     "

## 2024-06-20 NOTE — DISCHARGE INSTRUCTIONS
Recovery After Procedural Sedation (Adult)   You have been given medicine by vein to make you sleep during your surgery. This may have included both a pain medicine and sleeping medicine. Most of the effects have worn off. But you may still have some drowsiness for the next 6 to 8 hours.  Home care  Follow these guidelines when you get home:  For the next 8 hours, you should be watched by a responsible adult. This person should make sure your condition is not getting worse.  Don't drink any alcohol for the next 24 hours.  Don't drive, operate dangerous machinery, or make important business or personal decisions during the next 24 hours.  To prevent injury or falls, use caution when standing and walking for at least 24 hours after your procedure.  Note: Your healthcare provider may tell you not to take any medicine by mouth for pain or sleep in the next 4 hours. These medicines may react with the medicines you were given in the hospital. This could cause a much stronger response than usual.  Follow-up care  Follow up with your healthcare provider if you are not alert and back to your usual level of activity within 12 hours.  When to seek medical advice  Call your healthcare provider right away if any of these occur:  Drowsiness gets worse  Weakness or dizziness gets worse  Repeated vomiting  You can't be awakened  Fever  New rash  [employer] last reviewed this educational content on 9/1/2019  © 9091-3814 [employer]. [address] [place] [address], [place] [address]. All rights reserved. This information is not intended as a substitute for professional medical care. Always follow your healthcare professional's instructions         Tips to Control Acid Reflux    To control acid reflux, youll need to make some basic diet and lifestyle changes. The simple steps outlined below may be all youll need to ease discomfort.  Watch what you eat  Avoid fatty foods and spicy foods.  Eat fewer acidic foods, such as citrus  and tomato-based foods. These can increase symptoms.  Limit drinking alcohol, caffeine, and fizzy beverages. All increase acid reflux.  Try limiting chocolate, peppermint, and spearmint. These can worsen acid reflux in some people.  Watch when you eat  Avoid lying down for 3 hours after eating.  Do not snack before going to bed.  Raise your head  Raising your head and upper body by 4 to 6 inches helps limit reflux when youre lying down. Put blocks under the head of your bed frame to raise it.  Other changes  Lose weight, if you need to  Dont exercise near bedtime  Avoid tight-fitting clothes  Limit aspirin and ibuprofen  Stop smoking   Date Last Reviewed: 7/1/2016  © 5886-5892 [employer]. [address] [place] [address], [place] [address]. All rights reserved. This information is not intended as a substitute for professional medical care. Always follow your healthcare professional's instructions.           High-Fiber Diet  Fiber is in fruits, vegetables, cereals, and grains. Fiber passes through your body undigested. A high-fiber diet helps food move through your intestinal tract. The added bulk is helpful in preventing constipation. In people with diverticulosis, fiber helps clean out the pouches along the colon wall. It also prevents new pouches from forming. A high-fiber diet reduces the risk of colon cancer. It also lowers blood cholesterol and prevents high blood sugar in people with diabetes.    The fiber-rich foods listed below should be part of your diet. If you are not used to high-fiber foods, start with 1 or 2 foods from this list. Every 3 to 4 days add a new one to your diet. Do this until you are eating 4 high-fiber foods per day. This should give you 20 to 35 grams of fiber a day. It is also important to drink a lot of water when you are on this diet. You should have 6 to 8 glasses of water a day. Water makes the fiber swell and increases the benefit.  Foods high in dietary fiber  The following  foods are high in dietary fiber:  Breads. Breads made with 100% whole-wheat flour; jose, wheat, or rye crackers; whole-grain tortillas, bran muffins.  Cereals. Whole-grain and bran cereals with bran (shredded wheat, wheat flakes, raisin bran, corn bran); oatmeal, rolled oats, granola, and brown rice.  Fruits. Fresh fruits and their edible skins (pears, prunes, raisins, berries, apples, and apricots); bananas, citrus fruit, mangoes, pineapple; and prune juice.  Nuts. Any nuts and seeds.  Vegetables. Best served raw or lightly cooked. All types, especially: green peas, celery, eggplant, potatoes, spinach, broccoli, [place] sprouts, winter squash, carrots, cauliflower, soybeans, lentils, and fresh and dried beans of all kinds.  Other. Popcorn, any spices.  Date Last Reviewed: 8/1/2016  © 2304-4629 [employer]. [address] [place] [address]. All rights reserved. This information is not intended as a substitute for professional medical care. Always follow your healthcare professional's instructions.

## 2024-06-21 VITALS
WEIGHT: 184 LBS | RESPIRATION RATE: 16 BRPM | OXYGEN SATURATION: 99 % | TEMPERATURE: 98 F | HEIGHT: 66 IN | HEART RATE: 72 BPM | BODY MASS INDEX: 29.57 KG/M2 | SYSTOLIC BLOOD PRESSURE: 132 MMHG | DIASTOLIC BLOOD PRESSURE: 72 MMHG

## 2024-06-21 NOTE — ANESTHESIA POSTPROCEDURE EVALUATION
Anesthesia Post Evaluation    Patient: Brennan Bruner    Procedure(s) Performed: Procedure(s) (LRB):  EGD (ESOPHAGOGASTRODUODENOSCOPY) (N/A)    Final Anesthesia Type: general      Patient location during evaluation: PACU  Patient participation: Yes- Able to Participate  Level of consciousness: awake and alert  Post-procedure vital signs: reviewed and stable  Pain management: adequate  Airway patency: patent    PONV status at discharge: No PONV  Anesthetic complications: no      Cardiovascular status: blood pressure returned to baseline  Respiratory status: unassisted  Hydration status: euvolemic  Follow-up not needed.              Vitals Value Taken Time   /72 06/20/24 1554   Temp 36.4 °C (97.5 °F) 06/20/24 1530   Pulse 72 06/20/24 1554   Resp 16 06/20/24 1554   SpO2 99 % 06/20/24 1554         Event Time   Out of Recovery 16:05:39         Pain/Jacqueline Score: Jacqueline Score: 10 (6/20/2024  3:54 PM)

## 2024-06-24 LAB
FINAL PATHOLOGIC DIAGNOSIS: NORMAL
GROSS: NORMAL
Lab: NORMAL

## 2024-07-16 ENCOUNTER — TELEPHONE (OUTPATIENT)
Dept: GASTROENTEROLOGY | Facility: CLINIC | Age: 54
End: 2024-07-16
Payer: OTHER GOVERNMENT

## 2024-07-16 NOTE — TELEPHONE ENCOUNTER
Attempted to reach the patient to set up post procedure f/u with Mai Crowder NP per recommendations of Dr. Andrea, left a message, clinic number provided.

## 2024-07-17 ENCOUNTER — TELEPHONE (OUTPATIENT)
Dept: GASTROENTEROLOGY | Facility: CLINIC | Age: 54
End: 2024-07-17
Payer: OTHER GOVERNMENT

## (undated) DEVICE — MARKER SKIN STND TIP BLUE BARR

## (undated) DEVICE — TRAY NERVE BLOCK

## (undated) DEVICE — SEE MEDLINE ITEM 146313

## (undated) DEVICE — PAD ELECTROSURGICAL PAT PLATE

## (undated) DEVICE — SUT PLN GUT 4-0 SC-1SC-1 1

## (undated) DEVICE — SYR GLASS 5CC LUER LOK

## (undated) DEVICE — SHEET EENT SPLIT

## (undated) DEVICE — GLOVE SURGICAL LATEX SZ 7

## (undated) DEVICE — CANNULA CVD 100MM X 20G

## (undated) DEVICE — NDL TUOHY EPIDURAL 20G X 3.5

## (undated) DEVICE — SEE MEDLINE ITEM 152622

## (undated) DEVICE — APPLICATOR CHLORAPREP CLR 10.5

## (undated) DEVICE — ELECTRODE REM PLYHSV RETURN 9

## (undated) DEVICE — NDL SPINAL SPINOCAN 22GX3.5

## (undated) DEVICE — NDL 18GA X1 1/2 REG BEVEL

## (undated) DEVICE — KIT ANTIFOG

## (undated) DEVICE — NEPTUNE 4 PORT MANIFOLD

## (undated) DEVICE — NDL HYPO 27G X 1 1/2

## (undated) DEVICE — SPONGE PATTY SURGICAL .5X3IN

## (undated) DEVICE — BLADE TRICUT 3.5MM

## (undated) DEVICE — BLADE RED 40 ADENOID

## (undated) DEVICE — SUT ETHILON 2-0 BLK MONO PS

## (undated) DEVICE — SUCTION COAGULATOR 10FR 6IN

## (undated) DEVICE — SYR 10CC LUER LOCK

## (undated) DEVICE — TUBING XPS IRRIG TO STRAIGHTSH

## (undated) DEVICE — SPLINT NASAL AIRWAY SEPTAL SIL

## (undated) DEVICE — SEE L#120831

## (undated) DEVICE — Device